# Patient Record
Sex: MALE | Race: WHITE | NOT HISPANIC OR LATINO | Employment: OTHER | URBAN - NONMETROPOLITAN AREA
[De-identification: names, ages, dates, MRNs, and addresses within clinical notes are randomized per-mention and may not be internally consistent; named-entity substitution may affect disease eponyms.]

---

## 2017-05-23 ENCOUNTER — OUTSIDE FACILITY SERVICE (OUTPATIENT)
Dept: CARDIOLOGY | Facility: CLINIC | Age: 72
End: 2017-05-23

## 2017-05-23 PROCEDURE — 93306 TTE W/DOPPLER COMPLETE: CPT | Performed by: INTERNAL MEDICINE

## 2017-06-19 ENCOUNTER — OFFICE VISIT (OUTPATIENT)
Dept: CARDIOLOGY | Age: 72
End: 2017-06-19
Payer: MEDICARE

## 2017-06-19 ENCOUNTER — TELEPHONE (OUTPATIENT)
Dept: CARDIOLOGY | Age: 72
End: 2017-06-19

## 2017-06-19 VITALS
HEART RATE: 49 BPM | WEIGHT: 283.8 LBS | SYSTOLIC BLOOD PRESSURE: 100 MMHG | HEIGHT: 70 IN | BODY MASS INDEX: 40.63 KG/M2 | DIASTOLIC BLOOD PRESSURE: 60 MMHG

## 2017-06-19 DIAGNOSIS — I10 ESSENTIAL HYPERTENSION: Primary | ICD-10-CM

## 2017-06-19 PROCEDURE — G8598 ASA/ANTIPLAT THER USED: HCPCS | Performed by: INTERNAL MEDICINE

## 2017-06-19 PROCEDURE — G8427 DOCREV CUR MEDS BY ELIG CLIN: HCPCS | Performed by: INTERNAL MEDICINE

## 2017-06-19 PROCEDURE — 3017F COLORECTAL CA SCREEN DOC REV: CPT | Performed by: INTERNAL MEDICINE

## 2017-06-19 PROCEDURE — 1123F ACP DISCUSS/DSCN MKR DOCD: CPT | Performed by: INTERNAL MEDICINE

## 2017-06-19 PROCEDURE — 4040F PNEUMOC VAC/ADMIN/RCVD: CPT | Performed by: INTERNAL MEDICINE

## 2017-06-19 PROCEDURE — 1036F TOBACCO NON-USER: CPT | Performed by: INTERNAL MEDICINE

## 2017-06-19 PROCEDURE — 99214 OFFICE O/P EST MOD 30 MIN: CPT | Performed by: INTERNAL MEDICINE

## 2017-06-19 PROCEDURE — G8417 CALC BMI ABV UP PARAM F/U: HCPCS | Performed by: INTERNAL MEDICINE

## 2017-06-19 PROCEDURE — 93000 ELECTROCARDIOGRAM COMPLETE: CPT | Performed by: INTERNAL MEDICINE

## 2017-06-19 RX ORDER — NIFEDIPINE 90 MG/1
90 TABLET, FILM COATED, EXTENDED RELEASE ORAL DAILY
COMMUNITY
Start: 2017-06-08 | End: 2017-10-04

## 2017-06-19 RX ORDER — SPIRONOLACTONE 100 MG/1
100 TABLET, FILM COATED ORAL DAILY
COMMUNITY
Start: 2017-05-25 | End: 2017-10-04 | Stop reason: ALTCHOICE

## 2017-06-19 RX ORDER — METOPROLOL TARTRATE 50 MG/1
25 TABLET, FILM COATED ORAL 2 TIMES DAILY
COMMUNITY
Start: 2017-06-08 | End: 2017-10-04 | Stop reason: SDUPTHER

## 2017-06-19 RX ORDER — NIFEDIPINE 30 MG/1
30 TABLET, FILM COATED, EXTENDED RELEASE ORAL DAILY
Qty: 30 TABLET | Refills: 3 | Status: CANCELLED | OUTPATIENT
Start: 2017-06-19

## 2017-06-19 RX ORDER — FUROSEMIDE 80 MG
TABLET ORAL DAILY
COMMUNITY
Start: 2017-05-15 | End: 2017-07-24 | Stop reason: ALTCHOICE

## 2017-06-19 RX ORDER — POTASSIUM CHLORIDE 750 MG/1
TABLET, FILM COATED, EXTENDED RELEASE ORAL 2 TIMES DAILY
COMMUNITY
Start: 2017-04-24 | End: 2017-10-04 | Stop reason: ALTCHOICE

## 2017-07-24 ENCOUNTER — OFFICE VISIT (OUTPATIENT)
Dept: CARDIOLOGY | Age: 72
End: 2017-07-24
Payer: MEDICARE

## 2017-07-24 VITALS
DIASTOLIC BLOOD PRESSURE: 72 MMHG | HEIGHT: 70 IN | SYSTOLIC BLOOD PRESSURE: 122 MMHG | WEIGHT: 282 LBS | HEART RATE: 44 BPM | BODY MASS INDEX: 40.37 KG/M2

## 2017-07-24 DIAGNOSIS — T82.03XA PERIVALVULAR LEAK OF PROSTHETIC HEART VALVE, INITIAL ENCOUNTER: ICD-10-CM

## 2017-07-24 DIAGNOSIS — Z95.2 S/P AVR: ICD-10-CM

## 2017-07-24 DIAGNOSIS — R06.09 DOE (DYSPNEA ON EXERTION): ICD-10-CM

## 2017-07-24 DIAGNOSIS — I35.9 AORTIC VALVE DISORDER: Primary | ICD-10-CM

## 2017-07-24 DIAGNOSIS — Z95.2 S/P AVR (AORTIC VALVE REPLACEMENT): ICD-10-CM

## 2017-07-24 DIAGNOSIS — Z95.3 S/P AORTIC VALVE REPLACEMENT WITH BIOPROSTHETIC VALVE: ICD-10-CM

## 2017-07-24 DIAGNOSIS — I10 ESSENTIAL HYPERTENSION: ICD-10-CM

## 2017-07-24 DIAGNOSIS — I25.10 CORONARY ARTERY DISEASE INVOLVING NATIVE HEART WITHOUT ANGINA PECTORIS, UNSPECIFIED VESSEL OR LESION TYPE: ICD-10-CM

## 2017-07-24 LAB
ANION GAP SERPL CALCULATED.3IONS-SCNC: 17 MMOL/L (ref 7–19)
BASOPHILS ABSOLUTE: 0 K/UL (ref 0–0.2)
BASOPHILS RELATIVE PERCENT: 0.6 % (ref 0–1)
BUN BLDV-MCNC: 44 MG/DL (ref 8–23)
CALCIUM SERPL-MCNC: 8.9 MG/DL (ref 8.8–10.2)
CHLORIDE BLD-SCNC: 104 MMOL/L (ref 98–111)
CO2: 23 MMOL/L (ref 22–29)
CREAT SERPL-MCNC: 2.7 MG/DL (ref 0.5–1.2)
EOSINOPHILS ABSOLUTE: 0.1 K/UL (ref 0–0.6)
EOSINOPHILS RELATIVE PERCENT: 2.5 % (ref 0–5)
GFR NON-AFRICAN AMERICAN: 23
GLUCOSE BLD-MCNC: 100 MG/DL (ref 74–109)
HCT VFR BLD CALC: 28.3 % (ref 42–52)
HEMOGLOBIN: 9.2 G/DL (ref 14–18)
LYMPHOCYTES ABSOLUTE: 1.4 K/UL (ref 1.1–4.5)
LYMPHOCYTES RELATIVE PERCENT: 29.2 % (ref 20–40)
MCH RBC QN AUTO: 28.8 PG (ref 27–31)
MCHC RBC AUTO-ENTMCNC: 32.5 G/DL (ref 33–37)
MCV RBC AUTO: 88.7 FL (ref 80–94)
MONOCYTES ABSOLUTE: 0.6 K/UL (ref 0–0.9)
MONOCYTES RELATIVE PERCENT: 11.3 % (ref 0–10)
NEUTROPHILS ABSOLUTE: 2.7 K/UL (ref 1.5–7.5)
NEUTROPHILS RELATIVE PERCENT: 56.2 % (ref 50–65)
PDW BLD-RTO: 12.5 % (ref 11.5–14.5)
PLATELET # BLD: 216 K/UL (ref 130–400)
PMV BLD AUTO: 10.3 FL (ref 9.4–12.4)
POTASSIUM SERPL-SCNC: 4.7 MMOL/L (ref 3.5–5)
PRO-BNP: 3341 PG/ML (ref 0–900)
RBC # BLD: 3.19 M/UL (ref 4.7–6.1)
SODIUM BLD-SCNC: 144 MMOL/L (ref 136–145)
WBC # BLD: 4.9 K/UL (ref 4.8–10.8)

## 2017-07-24 PROCEDURE — G8427 DOCREV CUR MEDS BY ELIG CLIN: HCPCS | Performed by: NURSE PRACTITIONER

## 2017-07-24 PROCEDURE — 99213 OFFICE O/P EST LOW 20 MIN: CPT | Performed by: NURSE PRACTITIONER

## 2017-07-24 PROCEDURE — 1123F ACP DISCUSS/DSCN MKR DOCD: CPT | Performed by: NURSE PRACTITIONER

## 2017-07-24 PROCEDURE — G8417 CALC BMI ABV UP PARAM F/U: HCPCS | Performed by: NURSE PRACTITIONER

## 2017-07-24 PROCEDURE — 1036F TOBACCO NON-USER: CPT | Performed by: NURSE PRACTITIONER

## 2017-07-24 PROCEDURE — G8598 ASA/ANTIPLAT THER USED: HCPCS | Performed by: NURSE PRACTITIONER

## 2017-07-24 PROCEDURE — 93000 ELECTROCARDIOGRAM COMPLETE: CPT | Performed by: NURSE PRACTITIONER

## 2017-07-24 PROCEDURE — 3017F COLORECTAL CA SCREEN DOC REV: CPT | Performed by: NURSE PRACTITIONER

## 2017-07-24 PROCEDURE — 4040F PNEUMOC VAC/ADMIN/RCVD: CPT | Performed by: NURSE PRACTITIONER

## 2017-07-24 RX ORDER — BUMETANIDE 2 MG/1
1 TABLET ORAL 2 TIMES DAILY
COMMUNITY
Start: 2017-07-13

## 2017-07-24 RX ORDER — AMPICILLIN TRIHYDRATE 250 MG
200 CAPSULE ORAL 2 TIMES DAILY
COMMUNITY

## 2017-08-01 ENCOUNTER — HOSPITAL ENCOUNTER (OUTPATIENT)
Dept: CARDIAC CATH/INVASIVE PROCEDURES | Age: 72
Discharge: HOME | End: 2017-08-01
Attending: INTERNAL MEDICINE | Admitting: INTERNAL MEDICINE
Payer: MEDICARE

## 2017-08-01 VITALS
DIASTOLIC BLOOD PRESSURE: 78 MMHG | TEMPERATURE: 98.3 F | SYSTOLIC BLOOD PRESSURE: 156 MMHG | BODY MASS INDEX: 40.37 KG/M2 | HEART RATE: 60 BPM | RESPIRATION RATE: 17 BRPM | HEIGHT: 70 IN | WEIGHT: 282 LBS | OXYGEN SATURATION: 97 %

## 2017-08-01 DIAGNOSIS — Z95.2 S/P AVR (AORTIC VALVE REPLACEMENT): ICD-10-CM

## 2017-08-01 DIAGNOSIS — T82.03XA PERIVALVULAR LEAK OF PROSTHETIC HEART VALVE, INITIAL ENCOUNTER: ICD-10-CM

## 2017-08-01 DIAGNOSIS — I10 ESSENTIAL HYPERTENSION: ICD-10-CM

## 2017-08-01 DIAGNOSIS — R06.09 DOE (DYSPNEA ON EXERTION): ICD-10-CM

## 2017-08-01 DIAGNOSIS — I25.10 CORONARY ARTERY DISEASE INVOLVING NATIVE HEART WITHOUT ANGINA PECTORIS, UNSPECIFIED VESSEL OR LESION TYPE: ICD-10-CM

## 2017-08-01 DIAGNOSIS — I35.9 AORTIC VALVE DISORDER: ICD-10-CM

## 2017-08-01 LAB
LV EF: 63 %
LVEF MODALITY: NORMAL

## 2017-08-01 PROCEDURE — 93325 DOPPLER ECHO COLOR FLOW MAPG: CPT

## 2017-08-01 PROCEDURE — 6360000002 HC RX W HCPCS

## 2017-08-01 PROCEDURE — 93306 TTE W/DOPPLER COMPLETE: CPT

## 2017-08-01 PROCEDURE — 93321 DOPPLER ECHO F-UP/LMTD STD: CPT

## 2017-08-01 PROCEDURE — 2580000003 HC RX 258: Performed by: INTERNAL MEDICINE

## 2017-08-01 PROCEDURE — 6370000000 HC RX 637 (ALT 250 FOR IP)

## 2017-08-01 PROCEDURE — 93312 ECHO TRANSESOPHAGEAL: CPT

## 2017-08-01 RX ORDER — DOXAZOSIN 2 MG/1
2 TABLET ORAL NIGHTLY
COMMUNITY
End: 2017-10-04 | Stop reason: DRUGHIGH

## 2017-08-01 RX ORDER — SODIUM CHLORIDE 9 MG/ML
INJECTION, SOLUTION INTRAVENOUS CONTINUOUS
Status: DISCONTINUED | OUTPATIENT
Start: 2017-08-01 | End: 2017-08-01 | Stop reason: HOSPADM

## 2017-08-01 RX ORDER — SODIUM CHLORIDE 0.9 % (FLUSH) 0.9 %
10 SYRINGE (ML) INJECTION PRN
Status: DISCONTINUED | OUTPATIENT
Start: 2017-08-01 | End: 2017-08-01 | Stop reason: HOSPADM

## 2017-08-01 RX ORDER — SODIUM CHLORIDE 0.9 % (FLUSH) 0.9 %
10 SYRINGE (ML) INJECTION EVERY 12 HOURS SCHEDULED
Status: DISCONTINUED | OUTPATIENT
Start: 2017-08-01 | End: 2017-08-01 | Stop reason: HOSPADM

## 2017-08-01 RX ADMIN — SODIUM CHLORIDE: 9 INJECTION, SOLUTION INTRAVENOUS at 07:30

## 2017-10-02 ENCOUNTER — HOSPITAL ENCOUNTER (OUTPATIENT)
Dept: ULTRASOUND IMAGING | Age: 72
Discharge: HOME | End: 2017-10-02
Payer: MEDICARE

## 2017-10-02 DIAGNOSIS — N18.30 CHRONIC KIDNEY DISEASE, STAGE III (MODERATE) (HCC): ICD-10-CM

## 2017-10-02 PROCEDURE — 93976 VASCULAR STUDY: CPT

## 2017-10-04 ENCOUNTER — OFFICE VISIT (OUTPATIENT)
Dept: CARDIOLOGY | Age: 72
End: 2017-10-04
Payer: MEDICARE

## 2017-10-04 VITALS
HEIGHT: 70 IN | SYSTOLIC BLOOD PRESSURE: 138 MMHG | HEART RATE: 56 BPM | BODY MASS INDEX: 39.8 KG/M2 | WEIGHT: 278 LBS | DIASTOLIC BLOOD PRESSURE: 70 MMHG

## 2017-10-04 DIAGNOSIS — I10 ESSENTIAL HYPERTENSION: Primary | ICD-10-CM

## 2017-10-04 PROCEDURE — 99214 OFFICE O/P EST MOD 30 MIN: CPT | Performed by: INTERNAL MEDICINE

## 2017-10-04 PROCEDURE — G8484 FLU IMMUNIZE NO ADMIN: HCPCS | Performed by: INTERNAL MEDICINE

## 2017-10-04 PROCEDURE — 1123F ACP DISCUSS/DSCN MKR DOCD: CPT | Performed by: INTERNAL MEDICINE

## 2017-10-04 PROCEDURE — 93000 ELECTROCARDIOGRAM COMPLETE: CPT | Performed by: INTERNAL MEDICINE

## 2017-10-04 PROCEDURE — 3017F COLORECTAL CA SCREEN DOC REV: CPT | Performed by: INTERNAL MEDICINE

## 2017-10-04 PROCEDURE — G8417 CALC BMI ABV UP PARAM F/U: HCPCS | Performed by: INTERNAL MEDICINE

## 2017-10-04 PROCEDURE — 1036F TOBACCO NON-USER: CPT | Performed by: INTERNAL MEDICINE

## 2017-10-04 PROCEDURE — G8598 ASA/ANTIPLAT THER USED: HCPCS | Performed by: INTERNAL MEDICINE

## 2017-10-04 PROCEDURE — G8427 DOCREV CUR MEDS BY ELIG CLIN: HCPCS | Performed by: INTERNAL MEDICINE

## 2017-10-04 PROCEDURE — 4040F PNEUMOC VAC/ADMIN/RCVD: CPT | Performed by: INTERNAL MEDICINE

## 2017-10-04 RX ORDER — HYDRALAZINE HYDROCHLORIDE 100 MG/1
100 TABLET, FILM COATED ORAL 2 TIMES DAILY
COMMUNITY
End: 2022-03-12

## 2017-10-04 RX ORDER — ISOSORBIDE MONONITRATE 30 MG/1
30 TABLET, EXTENDED RELEASE ORAL DAILY
COMMUNITY

## 2017-10-04 RX ORDER — MULTIVITAMIN WITH IRON
250 TABLET ORAL DAILY
COMMUNITY
End: 2019-03-26 | Stop reason: ALTCHOICE

## 2017-10-04 RX ORDER — METOLAZONE 5 MG/1
5 TABLET ORAL DAILY
COMMUNITY
End: 2018-04-10 | Stop reason: ALTCHOICE

## 2017-10-04 RX ORDER — DOXAZOSIN 8 MG/1
8 TABLET ORAL NIGHTLY
COMMUNITY
End: 2022-03-12

## 2017-10-04 ASSESSMENT — ENCOUNTER SYMPTOMS: SHORTNESS OF BREATH: 1

## 2017-10-04 NOTE — MR AVS SNAPSHOT
After Visit Summary             Taylor Door   10/4/2017 8:30 AM   Office Visit    Description:  Male : 1945   Provider:  Tonya Albarran MD   Department:  [facility]              Your Follow-Up and Future Appointments         Below is a list of your follow-up and future appointments. This may not be a complete list as you may have made appointments directly with providers that we are not aware of or your providers may have made some for you. Please call your providers to confirm appointments. It is important to keep your appointments. Please bring your current insurance card, photo ID, co-pay, and all medication bottles to your appointment. If self-pay, payment is expected at the time of service. Your To-Do List     Future Appointments Provider Department Dept Phone    2018 11:30 AM Tonya Albarran MD [address] [place] [address] [phone]    Please arrive 15 minutes prior to appointment time, bring insurance card and photo ID. Follow-Up    Return in about 6 months (around 2018) for prosthetic valve mismatch, No Gown, Wednesday, 27 Min. Information from Your Visit        Department     Name Address Phone Fax    [facility] [address] [place] [address] Street [address] [place] [address] [phone]      You Were Seen for:         Comments    Essential hypertension   [[id number]]         Vital Signs     Blood Pressure Pulse Height Weight Body Mass Index Smoking Status    138/70 56 5' 10\" (1.778 m) 278 lb (126.1 kg) 39.89 kg/m2 Former Smoker      Additional Information about your Body Mass Index (BMI)           Your BMI as listed above is considered obese (30 or more). BMI is an estimate of body fat, calculated from your height and weight.   The higher your BMI, the greater your risk of heart disease, high blood pressure,

## 2017-10-04 NOTE — PROGRESS NOTES
He is doing better. They got the fluid off and the breathing is better. Recently dx with chronic kidney disease stage four with Useff.  Being treated fro [facility]     HTN: patient is considering going to hypertension center     FU once here then back to [place]

## 2017-10-04 NOTE — PROGRESS NOTES
Hyperlipemia     Hypertension 3/23/2012    Obesity     Unspecified sleep apnea        Past Surgical History:   Procedure Laterality Date    AORTIC VALVE REPLACEMENT  07/21/15    Tissue Valve, Dr. Staci Trejo  3/26/12    CARDIAC CATHETERIZATION  5/21/2013  [facility]    with stent to Circ- EF 50%    CARDIAC CATHETERIZATION  11/4/13  [facility]    EF 60%, no intervention    CARDIAC CATHETERIZATION  7/20/15  [facility]    severe aortic insufficiency. EF 50%    THORACENTESIS Left 08/04/15    VASCULAR SURGERY  11-6-13 [facility] & [address] R superficial femoral artery and psuedoaneurysm. Thrombin injection R SFA pseudoaneurysm. Family History   Problem Relation Age of Onset    Heart Disease Other     High Blood Pressure Other        Social History     Social History    Marital status:      Spouse name: N/A    Number of children: N/A    Years of education: N/A     Occupational History    Not on file.      Social History Main Topics    Smoking status: Former Smoker     Quit date: 6/9/1985    Smokeless tobacco: Not on file    Alcohol use No    Drug use: No    Sexual activity: Not on file     Other Topics Concern    Not on file     Social History Narrative       No Known Allergies      Current Outpatient Prescriptions:     doxazosin (CARDURA) 8 MG tablet, Take 8 mg by mouth nightly, Disp: , Rfl:     magnesium (MAGNESIUM-OXIDE) 250 MG TABS tablet, Take 250 mg by mouth daily, Disp: , Rfl:     isosorbide mononitrate (IMDUR) 30 MG extended release tablet, Take 30 mg by mouth daily, Disp: , Rfl:     hydrALAZINE (APRESOLINE) 100 MG tablet, Take 100 mg by mouth 2 times daily, Disp: , Rfl:     metolazone (ZAROXOLYN) 5 MG tablet, Take 5 mg by mouth daily, Disp: , Rfl:     Coenzyme Q10 (COQ10) 200 MG CAPS, Take 200 mg by mouth nightly , Disp: , Rfl:     bumetanide (BUMEX) 2 MG tablet, 2 mg 2 times daily , Disp: , Rfl:     metoprolol tartrate (LOPRESSOR) 50 MG tablet, 25 mg 2 times daily , Disp: , Rfl:    aspirin 81 MG tablet, Take 81 mg by mouth daily, Disp: , Rfl:     atorvastatin (LIPITOR) 40 MG tablet, Take 1 tablet by mouth daily. (Patient taking differently: Take 40 mg by mouth every other day ), Disp: 30 tablet, Rfl: 5    omeprazole (PRILOSEC) 20 MG capsule, Take 20 mg by mouth daily. , Disp: , Rfl:     gabapentin (NEURONTIN) 300 MG capsule, Take 300 mg by mouth nightly , Disp: , Rfl:     PE:  Vitals:    10/04/17 0839   BP: 138/70   Pulse: 56       Estimated body mass index is 39.89 kg/(m^2) as calculated from the following:    Height as of this encounter: 5' 10\" (1.778 m). Weight as of this encounter: 278 lb (126.1 kg).     General - No acute distress  Eyes - PERRL, anicteric sclerae; no lid-lag  ENMT - Atraumatic; Mucous membranes moist, oropharynx clear  Neck - trachea midline, thyroid non-tender  Cardio - No jugular venous distension                Clear s1 s2, no gallop, rub,3 out of 6 harsh systolic murmur right upper sternal border             1+ lower extremity edema, normal pulses  Resp - Normal effort, Clear to auscultation bilaterally  GI - abdomen soft, non-tender, no hepatosplenomegaly  Skin - warm and dry; no rashes  Psych - A+O x 3, normal affect    Lab Results   Component Value Date    CREATININE 2.7 07/24/2017    CREATININE 1.0 10/05/2016    CREATININE 1.1 08/27/2015    CREATININE 0.9 03/26/2012    HGB 9.2 07/24/2017    HGB 13.3 10/05/2016    HGB 9.4 08/27/2015    PROBNP 3341 07/24/2017       ECG 10/04/17   Sinus bradycardia    TTE 8/1/17   1. Prosthetic valve in the aortic position with high gradient and flow.   Likely paravalvular leak mild   2. Mildly dilated left atrium   3. Mildly enlarged left ventricular cavity dimensions with moderate   concentric LVH   4. Normal LV systolic function (estimated LV EF 60-65%)   5. Grade II diastolic dysfunction (pseudo-normal filling) with elevated   left atrial pressure   6. Mild tricuspid regurgitation; estimated RVSP of at least 33 mmHg    JONNY

## 2017-10-04 NOTE — LETTER
10/4/17      Stephany Castillo  [address]    He is doing better. They got the fluid off and the breathing is better. Recently dx with chronic kidney disease stage four with Useff. Being treated fro [facility]     HTN: patient is considering going to hypertension center     FU once here then back to [facility],    Thank you for allowing me to participate in the care of Mr. Jan Mora. He presents today at the [address] [place] [address] in the [facility] his wife. As you know, Mr. Maverick Mancera is a 70 y.o. 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 Since last being seen, we performed a transthoracic echo and a transesophageal echo. The trans-thoracic echo showed velocities of 4.27 m/s with a mean gradient of 41 mm per mercury. The effective orifice area was 0.44 cm²/m². We performed a transesophageal echo that showed a normal functioning bioprosthetic valve in the aortic position with mild paravalvular leak. Since then, he says that he's done better with diuretics. He still gets short of air when he walks. He still has fatigue. Nothing makes it better except rest.  His blood pressures been poorly controlled and he has been seeing his primary care doctor for management. Of note, he was also diagnosed with anemia with a recent hemoglobin of 8.8. His wife intends to take into the hypertensive center at [address] [place] [address]. He's been compliant with his medications. He has no chest pain. He otherwise denies chest pain, PND, syncope, or near syncope. He has no other complaints. Review of Systems   Constitutional: Positive for malaise/fatigue. Respiratory: Positive for shortness of breath. Cardiovascular: Negative for chest pain.   hydrALAZINE (APRESOLINE) 100 MG tablet, Take 100 mg by mouth 2 times daily, Disp: , Rfl:     metolazone (ZAROXOLYN) 5 MG tablet, Take 5 mg by mouth daily, Disp: , Rfl:     Coenzyme Q10 (COQ10) 200 MG CAPS, Take 200 mg by mouth nightly , Disp: , Rfl:     bumetanide (BUMEX) 2 MG tablet, 2 mg 2 times daily , Disp: , Rfl:     metoprolol tartrate (LOPRESSOR) 50 MG tablet, 25 mg 2 times daily , Disp: , Rfl:     aspirin 81 MG tablet, Take 81 mg by mouth daily, Disp: , Rfl:     atorvastatin (LIPITOR) 40 MG tablet, Take 1 tablet by mouth daily. (Patient taking differently: Take 40 mg by mouth every other day ), Disp: 30 tablet, Rfl: 5    omeprazole (PRILOSEC) 20 MG capsule, Take 20 mg by mouth daily. , Disp: , Rfl:     gabapentin (NEURONTIN) 300 MG capsule, Take 300 mg by mouth nightly , Disp: , Rfl:     PE:  Vitals:    10/04/17 0839   BP: 138/70   Pulse: 56       Estimated body mass index is 39.89 kg/(m^2) as calculated from the following:    Height as of this encounter: 5' 10\" (1.778 m). Weight as of this encounter: 278 lb (126.1 kg).     General - No acute distress  Eyes - PERRL, anicteric sclerae; no lid-lag  ENMT - Atraumatic; Mucous membranes moist, oropharynx clear  Neck - trachea midline, thyroid non-tender  Cardio - No jugular venous distension                Clear s1 s2, no gallop, rub,3 out of 6 harsh systolic murmur right upper sternal border             1+ lower extremity edema, normal pulses  Resp - Normal effort, Clear to auscultation bilaterally  GI - abdomen soft, non-tender, no hepatosplenomegaly  Skin - warm and dry; no rashes  Psych - A+O x 3, normal affect    Lab Results   Component Value Date    CREATININE 2.7 07/24/2017    CREATININE 1.0 10/05/2016    CREATININE 1.1 08/27/2015    CREATININE 0.9 03/26/2012    HGB 9.2 07/24/2017    HGB 13.3 10/05/2016    HGB 9.4 08/27/2015    PROBNP 3341 07/24/2017       ECG 10/04/17   Sinus bradycardia    TTE 8/1/17

## 2017-11-14 ENCOUNTER — TRANSCRIBE ORDERS (OUTPATIENT)
Dept: ADMINISTRATIVE | Facility: HOSPITAL | Age: 72
End: 2017-11-14

## 2017-11-14 DIAGNOSIS — D72.819 LEUKOPENIA, UNSPECIFIED TYPE: Primary | ICD-10-CM

## 2017-11-16 ENCOUNTER — APPOINTMENT (OUTPATIENT)
Dept: GENERAL RADIOLOGY | Facility: HOSPITAL | Age: 72
End: 2017-11-16
Attending: INTERNAL MEDICINE

## 2017-11-22 ENCOUNTER — HOSPITAL ENCOUNTER (OUTPATIENT)
Dept: GENERAL RADIOLOGY | Facility: HOSPITAL | Age: 72
Discharge: HOME | End: 2017-11-22
Attending: INTERNAL MEDICINE | Admitting: INTERNAL MEDICINE

## 2017-11-22 DIAGNOSIS — D72.819 LEUKOPENIA, UNSPECIFIED TYPE: ICD-10-CM

## 2017-11-22 PROCEDURE — 77075 RADEX OSSEOUS SURVEY COMPL: CPT

## 2017-11-27 ENCOUNTER — TRANSCRIBE ORDERS (OUTPATIENT)
Dept: ADMINISTRATIVE | Facility: HOSPITAL | Age: 72
End: 2017-11-27

## 2017-11-27 DIAGNOSIS — M89.9 BONE LESION: Primary | ICD-10-CM

## 2017-12-01 ENCOUNTER — HOSPITAL ENCOUNTER (OUTPATIENT)
Dept: CT IMAGING | Facility: HOSPITAL | Age: 72
Discharge: HOME | End: 2017-12-01
Attending: INTERNAL MEDICINE | Admitting: INTERNAL MEDICINE

## 2017-12-01 VITALS
TEMPERATURE: 97.7 F | RESPIRATION RATE: 11 BRPM | DIASTOLIC BLOOD PRESSURE: 81 MMHG | BODY MASS INDEX: 38.51 KG/M2 | WEIGHT: 269 LBS | SYSTOLIC BLOOD PRESSURE: 171 MMHG | HEIGHT: 70 IN | OXYGEN SATURATION: 99 % | HEART RATE: 41 BPM

## 2017-12-01 DIAGNOSIS — M89.9 BONE LESION: ICD-10-CM

## 2017-12-01 LAB
APTT PPP: 29.1 SECONDS (ref 24.1–34.8)
BASOPHILS # BLD AUTO: 0.04 10*3/MM3 (ref 0–0.2)
BASOPHILS NFR BLD AUTO: 0.8 % (ref 0–2)
DEPRECATED RDW RBC AUTO: 44.8 FL (ref 40–54)
EOSINOPHIL # BLD AUTO: 0.12 10*3/MM3 (ref 0–0.7)
EOSINOPHIL NFR BLD AUTO: 2.5 % (ref 0–4)
ERYTHROCYTE [DISTWIDTH] IN BLOOD BY AUTOMATED COUNT: 13.7 % (ref 12–15)
HCT VFR BLD AUTO: 34.9 % (ref 40–52)
HGB BLD-MCNC: 10.9 G/DL (ref 14–18)
IMM GRANULOCYTES # BLD: 0.01 10*3/MM3 (ref 0–0.03)
IMM GRANULOCYTES NFR BLD: 0.2 % (ref 0–5)
INR PPP: 0.83 (ref 0.91–1.09)
LYMPHOCYTES # BLD AUTO: 0.99 10*3/MM3 (ref 0.72–4.86)
LYMPHOCYTES NFR BLD AUTO: 20.8 % (ref 15–45)
MCH RBC QN AUTO: 28.2 PG (ref 28–32)
MCHC RBC AUTO-ENTMCNC: 31.2 G/DL (ref 33–36)
MCV RBC AUTO: 90.4 FL (ref 82–95)
MONOCYTES # BLD AUTO: 0.4 10*3/MM3 (ref 0.19–1.3)
MONOCYTES NFR BLD AUTO: 8.4 % (ref 4–12)
NEUTROPHILS # BLD AUTO: 3.2 10*3/MM3 (ref 1.87–8.4)
NEUTROPHILS NFR BLD AUTO: 67.3 % (ref 39–78)
PLATELET # BLD AUTO: 173 10*3/MM3 (ref 130–400)
PMV BLD AUTO: 11.5 FL (ref 6–12)
PROTHROMBIN TIME: 11.6 SECONDS (ref 11.9–14.6)
RBC # BLD AUTO: 3.86 10*6/MM3 (ref 4.8–5.9)
WBC NRBC COR # BLD: 4.76 10*3/MM3 (ref 4.8–10.8)

## 2017-12-01 PROCEDURE — 88311 DECALCIFY TISSUE: CPT | Performed by: INTERNAL MEDICINE

## 2017-12-01 PROCEDURE — 88264 CHROMOSOME ANALYSIS 20-25: CPT | Performed by: INTERNAL MEDICINE

## 2017-12-01 PROCEDURE — 88305 TISSUE EXAM BY PATHOLOGIST: CPT | Performed by: INTERNAL MEDICINE

## 2017-12-01 PROCEDURE — 88280 CHROMOSOME KARYOTYPE STUDY: CPT | Performed by: INTERNAL MEDICINE

## 2017-12-01 PROCEDURE — 25010000002 MIDAZOLAM PER 1 MG: Performed by: RADIOLOGY

## 2017-12-01 PROCEDURE — 85730 THROMBOPLASTIN TIME PARTIAL: CPT | Performed by: INTERNAL MEDICINE

## 2017-12-01 PROCEDURE — 85610 PROTHROMBIN TIME: CPT | Performed by: INTERNAL MEDICINE

## 2017-12-01 PROCEDURE — 88184 FLOWCYTOMETRY/ TC 1 MARKER: CPT | Performed by: INTERNAL MEDICINE

## 2017-12-01 PROCEDURE — 85025 COMPLETE CBC W/AUTO DIFF WBC: CPT | Performed by: INTERNAL MEDICINE

## 2017-12-01 PROCEDURE — 36415 COLL VENOUS BLD VENIPUNCTURE: CPT

## 2017-12-01 PROCEDURE — 88237 TISSUE CULTURE BONE MARROW: CPT | Performed by: INTERNAL MEDICINE

## 2017-12-01 PROCEDURE — 88185 FLOWCYTOMETRY/TC ADD-ON: CPT | Performed by: INTERNAL MEDICINE

## 2017-12-01 PROCEDURE — 77012 CT SCAN FOR NEEDLE BIOPSY: CPT

## 2017-12-01 PROCEDURE — 25010000002 FENTANYL CITRATE (PF) 100 MCG/2ML SOLUTION: Performed by: RADIOLOGY

## 2017-12-01 PROCEDURE — 88313 SPECIAL STAINS GROUP 2: CPT | Performed by: INTERNAL MEDICINE

## 2017-12-01 RX ORDER — MIDAZOLAM HYDROCHLORIDE 1 MG/ML
INJECTION INTRAMUSCULAR; INTRAVENOUS
Status: COMPLETED | OUTPATIENT
Start: 2017-12-01 | End: 2017-12-01

## 2017-12-01 RX ORDER — ATORVASTATIN CALCIUM 40 MG/1
40 TABLET, FILM COATED ORAL NIGHTLY
COMMUNITY

## 2017-12-01 RX ORDER — ALLOPURINOL 100 MG/1
100 TABLET ORAL DAILY
COMMUNITY
End: 2019-12-17

## 2017-12-01 RX ORDER — HYDRALAZINE HYDROCHLORIDE 50 MG/1
100 TABLET, FILM COATED ORAL 3 TIMES DAILY
COMMUNITY
End: 2019-12-17

## 2017-12-01 RX ORDER — SPIRONOLACTONE 25 MG/1
25 TABLET ORAL 2 TIMES DAILY
COMMUNITY

## 2017-12-01 RX ORDER — ISOSORBIDE MONONITRATE 30 MG/1
30 TABLET, EXTENDED RELEASE ORAL DAILY
COMMUNITY

## 2017-12-01 RX ORDER — SODIUM CHLORIDE 0.9 % (FLUSH) 0.9 %
1-10 SYRINGE (ML) INJECTION AS NEEDED
Status: DISCONTINUED | OUTPATIENT
Start: 2017-12-01 | End: 2017-12-02 | Stop reason: HOSPADM

## 2017-12-01 RX ORDER — DOXAZOSIN 8 MG/1
8 TABLET ORAL NIGHTLY
COMMUNITY

## 2017-12-01 RX ORDER — GABAPENTIN 300 MG/1
300 CAPSULE ORAL DAILY
COMMUNITY

## 2017-12-01 RX ORDER — OMEPRAZOLE 20 MG/1
20 CAPSULE, DELAYED RELEASE ORAL DAILY
COMMUNITY

## 2017-12-01 RX ORDER — UBIDECARENONE 100 MG
200 CAPSULE ORAL DAILY
COMMUNITY

## 2017-12-01 RX ORDER — FENTANYL CITRATE 50 UG/ML
INJECTION, SOLUTION INTRAMUSCULAR; INTRAVENOUS
Status: COMPLETED | OUTPATIENT
Start: 2017-12-01 | End: 2017-12-01

## 2017-12-01 RX ORDER — BUMETANIDE 1 MG/1
1 TABLET ORAL 2 TIMES DAILY
COMMUNITY

## 2017-12-01 RX ORDER — ASPIRIN 81 MG/1
81 TABLET, CHEWABLE ORAL DAILY
COMMUNITY

## 2017-12-01 RX ORDER — MAGNESIUM GLUCONATE 27 MG(500)
250 TABLET ORAL 2 TIMES DAILY
COMMUNITY
End: 2019-12-17

## 2017-12-01 RX ORDER — LIDOCAINE HYDROCHLORIDE 10 MG/ML
INJECTION, SOLUTION INFILTRATION; PERINEURAL
Status: COMPLETED | OUTPATIENT
Start: 2017-12-01 | End: 2017-12-01

## 2017-12-01 RX ORDER — FERROUS SULFATE 325(65) MG
65 TABLET ORAL
COMMUNITY
End: 2019-12-17 | Stop reason: SDUPTHER

## 2017-12-01 RX ADMIN — LIDOCAINE HYDROCHLORIDE 10 ML: 10 INJECTION, SOLUTION INFILTRATION; PERINEURAL at 12:38

## 2017-12-01 RX ADMIN — FENTANYL CITRATE 50 MCG: 50 INJECTION, SOLUTION INTRAMUSCULAR; INTRAVENOUS at 12:37

## 2017-12-01 RX ADMIN — MIDAZOLAM 1 MG: 1 INJECTION INTRAMUSCULAR; INTRAVENOUS at 12:37

## 2017-12-04 ENCOUNTER — HOSPITAL ENCOUNTER (OUTPATIENT)
Dept: CT IMAGING | Facility: HOSPITAL | Age: 72
Discharge: HOME | End: 2017-12-04
Attending: INTERNAL MEDICINE | Admitting: INTERNAL MEDICINE

## 2017-12-04 ENCOUNTER — TRANSCRIBE ORDERS (OUTPATIENT)
Dept: ADMINISTRATIVE | Facility: HOSPITAL | Age: 72
End: 2017-12-04

## 2017-12-04 ENCOUNTER — LAB (OUTPATIENT)
Dept: LAB | Facility: HOSPITAL | Age: 72
End: 2017-12-04
Attending: INTERNAL MEDICINE

## 2017-12-04 DIAGNOSIS — N18.4 CHRONIC KIDNEY DISEASE, STAGE IV (SEVERE) (HCC): Primary | ICD-10-CM

## 2017-12-04 DIAGNOSIS — M89.9 BONE LESION: ICD-10-CM

## 2017-12-04 DIAGNOSIS — N18.4 CHRONIC KIDNEY DISEASE, STAGE IV (SEVERE) (HCC): ICD-10-CM

## 2017-12-04 LAB
ALBUMIN SERPL-MCNC: 4.2 G/DL (ref 3.5–5)
ANION GAP SERPL CALCULATED.3IONS-SCNC: 9 MMOL/L (ref 4–13)
BACTERIA UR QL AUTO: ABNORMAL /HPF
BILIRUB UR QL STRIP: NEGATIVE
BUN BLD-MCNC: 41 MG/DL (ref 5–21)
BUN/CREAT SERPL: 18.4 (ref 7–25)
CALCIUM SPEC-SCNC: 9.5 MG/DL (ref 8.4–10.4)
CHLORIDE SERPL-SCNC: 103 MMOL/L (ref 98–110)
CLARITY UR: CLEAR
CO2 SERPL-SCNC: 32 MMOL/L (ref 24–31)
COLOR UR: YELLOW
CREAT BLD-MCNC: 2.23 MG/DL (ref 0.5–1.4)
GFR SERPL CREATININE-BSD FRML MDRD: 29 ML/MIN/1.73
GLUCOSE BLD-MCNC: 121 MG/DL (ref 70–100)
GLUCOSE UR STRIP-MCNC: NEGATIVE MG/DL
HGB UR QL STRIP.AUTO: NEGATIVE
HYALINE CASTS UR QL AUTO: ABNORMAL /LPF
KETONES UR QL STRIP: NEGATIVE
LEUKOCYTE ESTERASE UR QL STRIP.AUTO: NEGATIVE
NITRITE UR QL STRIP: NEGATIVE
PH UR STRIP.AUTO: <=5 [PH] (ref 5–8)
PHOSPHATE SERPL-MCNC: 4.7 MG/DL (ref 2.5–4.5)
POTASSIUM BLD-SCNC: 4.5 MMOL/L (ref 3.5–5.3)
PROT UR QL STRIP: ABNORMAL
PTH-INTACT SERPL-MCNC: 71.7 PG/ML (ref 7.5–53.5)
RBC # UR: ABNORMAL /HPF
REF LAB TEST METHOD: ABNORMAL
SODIUM BLD-SCNC: 144 MMOL/L (ref 135–145)
SP GR UR STRIP: 1.01 (ref 1–1.03)
SQUAMOUS #/AREA URNS HPF: ABNORMAL /HPF
URATE SERPL-MCNC: 9.9 MG/DL (ref 3.5–8.5)
UROBILINOGEN UR QL STRIP: ABNORMAL
WBC UR QL AUTO: ABNORMAL /HPF

## 2017-12-04 PROCEDURE — 84550 ASSAY OF BLOOD/URIC ACID: CPT | Performed by: INTERNAL MEDICINE

## 2017-12-04 PROCEDURE — 81001 URINALYSIS AUTO W/SCOPE: CPT | Performed by: INTERNAL MEDICINE

## 2017-12-04 PROCEDURE — 80069 RENAL FUNCTION PANEL: CPT | Performed by: INTERNAL MEDICINE

## 2017-12-04 PROCEDURE — 36415 COLL VENOUS BLD VENIPUNCTURE: CPT

## 2017-12-04 PROCEDURE — 83970 ASSAY OF PARATHORMONE: CPT | Performed by: INTERNAL MEDICINE

## 2017-12-04 PROCEDURE — 74176 CT ABD & PELVIS W/O CONTRAST: CPT

## 2017-12-04 PROCEDURE — 71250 CT THORAX DX C-: CPT

## 2017-12-19 LAB
CYTO UR: NORMAL
LAB AP CASE REPORT: NORMAL
LAB AP DIAGNOSIS COMMENT: NORMAL
Lab: NORMAL
PATH REPORT.FINAL DX SPEC: NORMAL
PATH REPORT.GROSS SPEC: NORMAL

## 2018-01-23 ENCOUNTER — TRANSCRIBE ORDERS (OUTPATIENT)
Dept: ADMINISTRATIVE | Facility: HOSPITAL | Age: 73
End: 2018-01-23

## 2018-01-23 DIAGNOSIS — M89.9 BONE DISORDER: Primary | ICD-10-CM

## 2018-01-24 ENCOUNTER — HOSPITAL ENCOUNTER (OUTPATIENT)
Dept: CT IMAGING | Facility: HOSPITAL | Age: 73
Discharge: HOME | End: 2018-01-24
Attending: INTERNAL MEDICINE | Admitting: INTERNAL MEDICINE

## 2018-01-24 DIAGNOSIS — M89.9 BONE DISORDER: ICD-10-CM

## 2018-01-24 PROCEDURE — 73200 CT UPPER EXTREMITY W/O DYE: CPT

## 2018-03-23 ENCOUNTER — HOSPITAL ENCOUNTER (OUTPATIENT)
Dept: MRI IMAGING | Age: 73
Discharge: HOME | End: 2018-03-23
Payer: MEDICARE

## 2018-03-23 DIAGNOSIS — M54.16 RADICULOPATHY, LUMBAR REGION: ICD-10-CM

## 2018-03-23 PROCEDURE — 72148 MRI LUMBAR SPINE W/O DYE: CPT

## 2018-04-01 DIAGNOSIS — I10 ESSENTIAL HYPERTENSION: ICD-10-CM

## 2018-04-10 ENCOUNTER — OFFICE VISIT (OUTPATIENT)
Dept: CARDIOLOGY | Age: 73
End: 2018-04-10
Payer: MEDICARE

## 2018-04-10 VITALS
WEIGHT: 282 LBS | DIASTOLIC BLOOD PRESSURE: 62 MMHG | BODY MASS INDEX: 40.37 KG/M2 | HEIGHT: 70 IN | SYSTOLIC BLOOD PRESSURE: 130 MMHG | HEART RATE: 50 BPM

## 2018-04-10 DIAGNOSIS — I10 ESSENTIAL HYPERTENSION: Primary | ICD-10-CM

## 2018-04-10 DIAGNOSIS — Z95.2 S/P AORTIC VALVE REPLACEMENT WITH PROSTHETIC VALVE: ICD-10-CM

## 2018-04-10 PROCEDURE — G8417 CALC BMI ABV UP PARAM F/U: HCPCS | Performed by: INTERNAL MEDICINE

## 2018-04-10 PROCEDURE — G8427 DOCREV CUR MEDS BY ELIG CLIN: HCPCS | Performed by: INTERNAL MEDICINE

## 2018-04-10 PROCEDURE — 1123F ACP DISCUSS/DSCN MKR DOCD: CPT | Performed by: INTERNAL MEDICINE

## 2018-04-10 PROCEDURE — 1036F TOBACCO NON-USER: CPT | Performed by: INTERNAL MEDICINE

## 2018-04-10 PROCEDURE — 99214 OFFICE O/P EST MOD 30 MIN: CPT | Performed by: INTERNAL MEDICINE

## 2018-04-10 PROCEDURE — 3017F COLORECTAL CA SCREEN DOC REV: CPT | Performed by: INTERNAL MEDICINE

## 2018-04-10 PROCEDURE — 93000 ELECTROCARDIOGRAM COMPLETE: CPT | Performed by: INTERNAL MEDICINE

## 2018-04-10 PROCEDURE — G8598 ASA/ANTIPLAT THER USED: HCPCS | Performed by: INTERNAL MEDICINE

## 2018-04-10 PROCEDURE — 4040F PNEUMOC VAC/ADMIN/RCVD: CPT | Performed by: INTERNAL MEDICINE

## 2018-04-10 RX ORDER — SPIRONOLACTONE 25 MG/1
25 TABLET ORAL 2 TIMES DAILY
COMMUNITY
End: 2022-03-12

## 2018-04-10 RX ORDER — AMLODIPINE BESYLATE 5 MG/1
5 TABLET ORAL DAILY
COMMUNITY
End: 2022-03-12

## 2018-04-10 RX ORDER — FERROUS SULFATE 325(65) MG
325 TABLET ORAL
COMMUNITY
End: 2019-03-26 | Stop reason: ALTCHOICE

## 2018-04-10 RX ORDER — ALLOPURINOL 300 MG/1
300 TABLET ORAL DAILY
COMMUNITY

## 2018-04-10 ASSESSMENT — ENCOUNTER SYMPTOMS: SHORTNESS OF BREATH: 0

## 2018-07-30 ENCOUNTER — TELEPHONE (OUTPATIENT)
Dept: CARDIOLOGY | Age: 73
End: 2018-07-30

## 2018-07-30 NOTE — TELEPHONE ENCOUNTER
pt received letter regarding Coretta Minor, pt was a Dr. Oneyda Mcintyre pt (last seen in 2016) and wants to go back to [facility] Mcintyre with Coretta Minor leaving, please call to discuss [phone]

## 2018-07-31 ENCOUNTER — TELEPHONE (OUTPATIENT)
Dept: CARDIOLOGY | Age: 73
End: 2018-07-31

## 2019-02-26 ENCOUNTER — LAB REQUISITION (OUTPATIENT)
Dept: LAB | Facility: HOSPITAL | Age: 74
End: 2019-02-26

## 2019-02-26 DIAGNOSIS — Z00.00 ENCOUNTER FOR GENERAL ADULT MEDICAL EXAMINATION WITHOUT ABNORMAL FINDINGS: ICD-10-CM

## 2019-02-26 LAB
BASOPHILS # BLD AUTO: 0.04 10*3/MM3 (ref 0–0.2)
BASOPHILS NFR BLD AUTO: 0.6 % (ref 0–2)
DEPRECATED RDW RBC AUTO: 48.5 FL (ref 40–54)
EOSINOPHIL # BLD AUTO: 0.2 10*3/MM3 (ref 0–0.7)
EOSINOPHIL NFR BLD AUTO: 3 % (ref 0–4)
ERYTHROCYTE [DISTWIDTH] IN BLOOD BY AUTOMATED COUNT: 13.6 % (ref 12–15)
HCT VFR BLD AUTO: 38.5 % (ref 40–52)
HGB BLD-MCNC: 12.1 G/DL (ref 14–18)
IMM GRANULOCYTES # BLD AUTO: 0.03 10*3/MM3 (ref 0–0.05)
IMM GRANULOCYTES NFR BLD AUTO: 0.4 % (ref 0–5)
LYMPHOCYTES # BLD AUTO: 1.05 10*3/MM3 (ref 0.72–4.86)
LYMPHOCYTES NFR BLD AUTO: 15.5 % (ref 15–45)
MCH RBC QN AUTO: 30.6 PG (ref 28–32)
MCHC RBC AUTO-ENTMCNC: 31.4 G/DL (ref 33–36)
MCV RBC AUTO: 97.2 FL (ref 82–95)
MONOCYTES # BLD AUTO: 0.52 10*3/MM3 (ref 0.19–1.3)
MONOCYTES NFR BLD AUTO: 7.7 % (ref 4–12)
NEUTROPHILS # BLD AUTO: 4.92 10*3/MM3 (ref 1.87–8.4)
NEUTROPHILS NFR BLD AUTO: 72.8 % (ref 39–78)
NRBC BLD AUTO-RTO: 0 /100 WBC (ref 0–0)
PLATELET # BLD AUTO: 211 10*3/MM3 (ref 130–400)
PMV BLD AUTO: 11.1 FL (ref 6–12)
RBC # BLD AUTO: 3.96 10*6/MM3 (ref 4.8–5.9)
WBC NRBC COR # BLD: 6.76 10*3/MM3 (ref 4.8–10.8)

## 2019-02-26 PROCEDURE — 85025 COMPLETE CBC W/AUTO DIFF WBC: CPT | Performed by: INTERNAL MEDICINE

## 2019-03-12 ENCOUNTER — LAB REQUISITION (OUTPATIENT)
Dept: LAB | Facility: HOSPITAL | Age: 74
End: 2019-03-12

## 2019-03-12 DIAGNOSIS — D72.819 DECREASED WHITE BLOOD CELL COUNT: ICD-10-CM

## 2019-03-12 DIAGNOSIS — D63.1 ANEMIA IN CHRONIC KIDNEY DISEASE (CODE): ICD-10-CM

## 2019-03-12 DIAGNOSIS — D64.9 ANEMIA: ICD-10-CM

## 2019-03-12 LAB
ALBUMIN SERPL-MCNC: 4.2 G/DL (ref 3.5–5)
ALBUMIN/GLOB SERPL: 1.9 G/DL (ref 1.1–2.5)
ALP SERPL-CCNC: 102 U/L (ref 24–120)
ALT SERPL W P-5'-P-CCNC: 54 U/L (ref 0–54)
ANION GAP SERPL CALCULATED.3IONS-SCNC: 14 MMOL/L (ref 4–13)
AST SERPL-CCNC: 29 U/L (ref 7–45)
BILIRUB SERPL-MCNC: 0.6 MG/DL (ref 0.1–1)
BUN BLD-MCNC: 44 MG/DL (ref 5–21)
BUN/CREAT SERPL: 21.7 (ref 7–25)
CALCIUM SPEC-SCNC: 9.5 MG/DL (ref 8.4–10.4)
CHLORIDE SERPL-SCNC: 98 MMOL/L (ref 98–110)
CO2 SERPL-SCNC: 27 MMOL/L (ref 24–31)
CREAT BLD-MCNC: 2.03 MG/DL (ref 0.5–1.4)
FERRITIN SERPL-MCNC: 393 NG/ML (ref 17.9–464)
FOLATE SERPL-MCNC: 7.37 NG/ML (ref 4.78–24.2)
GFR SERPL CREATININE-BSD FRML MDRD: 32 ML/MIN/1.73
GLOBULIN UR ELPH-MCNC: 2.2 GM/DL
GLUCOSE BLD-MCNC: 191 MG/DL (ref 70–100)
IRON 24H UR-MRATE: 83 MCG/DL (ref 42–180)
IRON SATN MFR SERPL: 28 % (ref 20–45)
POTASSIUM BLD-SCNC: 4.4 MMOL/L (ref 3.5–5.3)
PROT SERPL-MCNC: 6.4 G/DL (ref 6.3–8.7)
SODIUM BLD-SCNC: 139 MMOL/L (ref 135–145)
TIBC SERPL-MCNC: 301 MCG/DL (ref 225–420)
VIT B12 BLD-MCNC: 369 PG/ML (ref 239–931)

## 2019-03-12 PROCEDURE — 84165 PROTEIN E-PHORESIS SERUM: CPT | Performed by: INTERNAL MEDICINE

## 2019-03-12 PROCEDURE — 82746 ASSAY OF FOLIC ACID SERUM: CPT | Performed by: INTERNAL MEDICINE

## 2019-03-12 PROCEDURE — 82784 ASSAY IGA/IGD/IGG/IGM EACH: CPT | Performed by: INTERNAL MEDICINE

## 2019-03-12 PROCEDURE — 80053 COMPREHEN METABOLIC PANEL: CPT | Performed by: INTERNAL MEDICINE

## 2019-03-12 PROCEDURE — 83540 ASSAY OF IRON: CPT | Performed by: INTERNAL MEDICINE

## 2019-03-12 PROCEDURE — 82728 ASSAY OF FERRITIN: CPT | Performed by: INTERNAL MEDICINE

## 2019-03-12 PROCEDURE — 82607 VITAMIN B-12: CPT | Performed by: INTERNAL MEDICINE

## 2019-03-12 PROCEDURE — 83550 IRON BINDING TEST: CPT | Performed by: INTERNAL MEDICINE

## 2019-03-12 PROCEDURE — 83883 ASSAY NEPHELOMETRY NOT SPEC: CPT | Performed by: INTERNAL MEDICINE

## 2019-03-12 PROCEDURE — 86334 IMMUNOFIX E-PHORESIS SERUM: CPT | Performed by: INTERNAL MEDICINE

## 2019-03-14 LAB
KAPPA LC SERPL-MCNC: 29.2 MG/L (ref 3.3–19.4)
KAPPA LC/LAMBDA SER: 2.16 {RATIO} (ref 0.26–1.65)
LAMBDA LC FREE SERPL-MCNC: 13.5 MG/L (ref 5.7–26.3)

## 2019-03-19 ENCOUNTER — LAB REQUISITION (OUTPATIENT)
Dept: LAB | Facility: HOSPITAL | Age: 74
End: 2019-03-19

## 2019-03-19 DIAGNOSIS — Z00.00 ENCOUNTER FOR GENERAL ADULT MEDICAL EXAMINATION WITHOUT ABNORMAL FINDINGS: ICD-10-CM

## 2019-03-19 PROCEDURE — 84155 ASSAY OF PROTEIN SERUM: CPT | Performed by: INTERNAL MEDICINE

## 2019-03-19 PROCEDURE — 84165 PROTEIN E-PHORESIS SERUM: CPT | Performed by: INTERNAL MEDICINE

## 2019-03-20 LAB
ALBUMIN SERPL-MCNC: 4 G/DL (ref 2.9–4.4)
ALBUMIN/GLOB SERPL: 1.6 {RATIO} (ref 0.7–1.7)
ALPHA1 GLOB FLD ELPH-MCNC: 0.2 G/DL (ref 0–0.4)
ALPHA2 GLOB SERPL ELPH-MCNC: 0.9 G/DL (ref 0.4–1)
B-GLOBULIN SERPL ELPH-MCNC: 1 G/DL (ref 0.7–1.3)
GAMMA GLOB SERPL ELPH-MCNC: 0.5 G/DL (ref 0.4–1.8)
GLOBULIN SER CALC-MCNC: 2.6 G/DL (ref 2.2–3.9)
IGA SERPL-MCNC: 144 MG/DL (ref 61–437)
IGG SERPL-MCNC: 536 MG/DL (ref 700–1600)
IGM SERPL-MCNC: 29 MG/DL (ref 15–143)
INTERPRETATION SERPL IEP-IMP: ABNORMAL
Lab: ABNORMAL
M-SPIKE: 0.2 G/DL
PROT SERPL-MCNC: 6.6 G/DL (ref 6–8.5)
SPECIMEN STATUS: NORMAL

## 2019-03-21 LAB
ALBUMIN SERPL-MCNC: 3.8 G/DL (ref 2.9–4.4)
ALBUMIN/GLOB SERPL: 1.3 {RATIO} (ref 0.7–1.7)
ALPHA1 GLOB FLD ELPH-MCNC: 0.2 G/DL (ref 0–0.4)
ALPHA2 GLOB SERPL ELPH-MCNC: 0.9 G/DL (ref 0.4–1)
B-GLOBULIN SERPL ELPH-MCNC: 1.2 G/DL (ref 0.7–1.3)
GAMMA GLOB SERPL ELPH-MCNC: 0.6 G/DL (ref 0.4–1.8)
GLOBULIN SER CALC-MCNC: 2.9 G/DL (ref 2.2–3.9)
Lab: ABNORMAL
M-SPIKE: 0.2 G/DL
PROT PATTERN SERPL ELPH-IMP: ABNORMAL
PROT SERPL-MCNC: 6.7 G/DL (ref 6–8.5)
SPECIMEN STATUS: NORMAL

## 2019-03-26 ENCOUNTER — OFFICE VISIT (OUTPATIENT)
Dept: CARDIOLOGY | Age: 74
End: 2019-03-26
Payer: MEDICARE

## 2019-03-26 VITALS
HEART RATE: 66 BPM | SYSTOLIC BLOOD PRESSURE: 122 MMHG | BODY MASS INDEX: 42.66 KG/M2 | WEIGHT: 298 LBS | DIASTOLIC BLOOD PRESSURE: 70 MMHG | HEIGHT: 70 IN

## 2019-03-26 DIAGNOSIS — I38 VALVULAR HEART DISEASE: Primary | ICD-10-CM

## 2019-03-26 DIAGNOSIS — E78.2 MIXED HYPERLIPIDEMIA: ICD-10-CM

## 2019-03-26 DIAGNOSIS — I25.10 CORONARY ARTERY DISEASE INVOLVING NATIVE CORONARY ARTERY OF NATIVE HEART WITHOUT ANGINA PECTORIS: ICD-10-CM

## 2019-03-26 DIAGNOSIS — I10 ESSENTIAL HYPERTENSION: ICD-10-CM

## 2019-03-26 DIAGNOSIS — Z95.2 S/P AVR: ICD-10-CM

## 2019-03-26 DIAGNOSIS — I50.32 CHRONIC DIASTOLIC CHF (CONGESTIVE HEART FAILURE) (HCC): ICD-10-CM

## 2019-03-26 PROCEDURE — 1123F ACP DISCUSS/DSCN MKR DOCD: CPT | Performed by: CLINICAL NURSE SPECIALIST

## 2019-03-26 PROCEDURE — 99213 OFFICE O/P EST LOW 20 MIN: CPT | Performed by: CLINICAL NURSE SPECIALIST

## 2019-03-26 PROCEDURE — G8598 ASA/ANTIPLAT THER USED: HCPCS | Performed by: CLINICAL NURSE SPECIALIST

## 2019-03-26 PROCEDURE — G8417 CALC BMI ABV UP PARAM F/U: HCPCS | Performed by: CLINICAL NURSE SPECIALIST

## 2019-03-26 PROCEDURE — G8484 FLU IMMUNIZE NO ADMIN: HCPCS | Performed by: CLINICAL NURSE SPECIALIST

## 2019-03-26 PROCEDURE — 4040F PNEUMOC VAC/ADMIN/RCVD: CPT | Performed by: CLINICAL NURSE SPECIALIST

## 2019-03-26 PROCEDURE — 1036F TOBACCO NON-USER: CPT | Performed by: CLINICAL NURSE SPECIALIST

## 2019-03-26 PROCEDURE — 3017F COLORECTAL CA SCREEN DOC REV: CPT | Performed by: CLINICAL NURSE SPECIALIST

## 2019-03-26 PROCEDURE — 1101F PT FALLS ASSESS-DOCD LE1/YR: CPT | Performed by: CLINICAL NURSE SPECIALIST

## 2019-03-26 PROCEDURE — G8427 DOCREV CUR MEDS BY ELIG CLIN: HCPCS | Performed by: CLINICAL NURSE SPECIALIST

## 2019-03-26 ASSESSMENT — ENCOUNTER SYMPTOMS
ABDOMINAL PAIN: 0
BACK PAIN: 1
EYE REDNESS: 0
CHEST TIGHTNESS: 0
SHORTNESS OF BREATH: 1
COUGH: 0
VOMITING: 0
WHEEZING: 0
FACIAL SWELLING: 0
NAUSEA: 0

## 2019-03-29 LAB — REF LAB TEST METHOD: NORMAL

## 2019-04-16 ENCOUNTER — LAB REQUISITION (OUTPATIENT)
Dept: LAB | Facility: HOSPITAL | Age: 74
End: 2019-04-16

## 2019-04-16 DIAGNOSIS — Z00.00 ENCOUNTER FOR GENERAL ADULT MEDICAL EXAMINATION WITHOUT ABNORMAL FINDINGS: ICD-10-CM

## 2019-04-16 PROCEDURE — 83883 ASSAY NEPHELOMETRY NOT SPEC: CPT | Performed by: INTERNAL MEDICINE

## 2019-04-16 PROCEDURE — 84155 ASSAY OF PROTEIN SERUM: CPT | Performed by: INTERNAL MEDICINE

## 2019-04-16 PROCEDURE — 84165 PROTEIN E-PHORESIS SERUM: CPT | Performed by: INTERNAL MEDICINE

## 2019-04-16 PROCEDURE — 82784 ASSAY IGA/IGD/IGG/IGM EACH: CPT | Performed by: INTERNAL MEDICINE

## 2019-04-16 PROCEDURE — 86334 IMMUNOFIX E-PHORESIS SERUM: CPT | Performed by: INTERNAL MEDICINE

## 2019-04-19 LAB
ALBUMIN SERPL-MCNC: 3.6 G/DL (ref 2.9–4.4)
ALBUMIN/GLOB SERPL: 1.4 {RATIO} (ref 0.7–1.7)
ALPHA1 GLOB FLD ELPH-MCNC: 0.2 G/DL (ref 0–0.4)
ALPHA2 GLOB SERPL ELPH-MCNC: 0.8 G/DL (ref 0.4–1)
B-GLOBULIN SERPL ELPH-MCNC: 1.2 G/DL (ref 0.7–1.3)
GAMMA GLOB SERPL ELPH-MCNC: 0.5 G/DL (ref 0.4–1.8)
GLOBULIN SER CALC-MCNC: 2.7 G/DL (ref 2.2–3.9)
IGA SERPL-MCNC: 161 MG/DL (ref 61–437)
IGG SERPL-MCNC: 529 MG/DL (ref 700–1600)
IGM SERPL-MCNC: 30 MG/DL (ref 15–143)
INTERPRETATION SERPL IEP-IMP: ABNORMAL
KAPPA LC SERPL-MCNC: 34.3 MG/L (ref 3.3–19.4)
KAPPA LC/LAMBDA SER: 2.18 {RATIO} (ref 0.26–1.65)
LAMBDA LC FREE SERPL-MCNC: 15.7 MG/L (ref 5.7–26.3)
Lab: ABNORMAL
M-SPIKE: 0.2 G/DL
PROT SERPL-MCNC: 6.3 G/DL (ref 6–8.5)

## 2019-05-14 ENCOUNTER — LAB REQUISITION (OUTPATIENT)
Dept: LAB | Facility: HOSPITAL | Age: 74
End: 2019-05-14

## 2019-05-14 DIAGNOSIS — Z00.00 ENCOUNTER FOR GENERAL ADULT MEDICAL EXAMINATION WITHOUT ABNORMAL FINDINGS: ICD-10-CM

## 2019-05-14 LAB
ALBUMIN SERPL-MCNC: 4.1 G/DL (ref 3.5–5)
ALBUMIN/GLOB SERPL: 1.9 G/DL (ref 1.1–2.5)
ALP SERPL-CCNC: 99 U/L (ref 24–120)
ALT SERPL W P-5'-P-CCNC: 47 U/L (ref 0–54)
ANION GAP SERPL CALCULATED.3IONS-SCNC: 11 MMOL/L (ref 4–13)
AST SERPL-CCNC: 29 U/L (ref 7–45)
BILIRUB SERPL-MCNC: 0.5 MG/DL (ref 0.1–1)
BUN BLD-MCNC: 33 MG/DL (ref 5–21)
BUN/CREAT SERPL: 18.4 (ref 7–25)
CALCIUM SPEC-SCNC: 9.5 MG/DL (ref 8.4–10.4)
CHLORIDE SERPL-SCNC: 99 MMOL/L (ref 98–110)
CO2 SERPL-SCNC: 28 MMOL/L (ref 24–31)
CREAT BLD-MCNC: 1.79 MG/DL (ref 0.5–1.4)
FERRITIN SERPL-MCNC: 356 NG/ML (ref 17.9–464)
GFR SERPL CREATININE-BSD FRML MDRD: 37 ML/MIN/1.73
GLOBULIN UR ELPH-MCNC: 2.2 GM/DL
GLUCOSE BLD-MCNC: 268 MG/DL (ref 70–100)
IRON 24H UR-MRATE: 91 MCG/DL (ref 42–180)
IRON SATN MFR SERPL: 30 % (ref 20–45)
POTASSIUM BLD-SCNC: 4.7 MMOL/L (ref 3.5–5.3)
PROT SERPL-MCNC: 6.3 G/DL (ref 6.3–8.7)
SODIUM BLD-SCNC: 138 MMOL/L (ref 135–145)
TIBC SERPL-MCNC: 301 MCG/DL (ref 225–420)

## 2019-05-14 PROCEDURE — 82728 ASSAY OF FERRITIN: CPT | Performed by: INTERNAL MEDICINE

## 2019-05-14 PROCEDURE — 83540 ASSAY OF IRON: CPT | Performed by: INTERNAL MEDICINE

## 2019-05-14 PROCEDURE — 83550 IRON BINDING TEST: CPT | Performed by: INTERNAL MEDICINE

## 2019-05-14 PROCEDURE — 80053 COMPREHEN METABOLIC PANEL: CPT | Performed by: INTERNAL MEDICINE

## 2019-07-02 ENCOUNTER — LAB REQUISITION (OUTPATIENT)
Dept: LAB | Facility: HOSPITAL | Age: 74
End: 2019-07-02

## 2019-07-02 DIAGNOSIS — Z00.00 ENCOUNTER FOR GENERAL ADULT MEDICAL EXAMINATION WITHOUT ABNORMAL FINDINGS: ICD-10-CM

## 2019-07-02 LAB
ALBUMIN SERPL-MCNC: 4.5 G/DL (ref 3.5–5)
ALBUMIN/GLOB SERPL: 2 G/DL (ref 1.1–2.5)
ALP SERPL-CCNC: 131 U/L (ref 24–120)
ALT SERPL W P-5'-P-CCNC: 51 U/L (ref 0–54)
ANION GAP SERPL CALCULATED.3IONS-SCNC: 13 MMOL/L (ref 4–13)
AST SERPL-CCNC: 28 U/L (ref 7–45)
BILIRUB SERPL-MCNC: 0.6 MG/DL (ref 0.1–1)
BUN BLD-MCNC: 32 MG/DL (ref 5–21)
BUN/CREAT SERPL: 17.6 (ref 7–25)
CALCIUM SPEC-SCNC: 9.4 MG/DL (ref 8.4–10.4)
CHLORIDE SERPL-SCNC: 97 MMOL/L (ref 98–110)
CO2 SERPL-SCNC: 27 MMOL/L (ref 24–31)
CREAT BLD-MCNC: 1.82 MG/DL (ref 0.5–1.4)
FERRITIN SERPL-MCNC: 343 NG/ML (ref 17.9–464)
FOLATE SERPL-MCNC: 8.11 NG/ML (ref 4.78–24.2)
GFR SERPL CREATININE-BSD FRML MDRD: 37 ML/MIN/1.73
GLOBULIN UR ELPH-MCNC: 2.2 GM/DL
GLUCOSE BLD-MCNC: 328 MG/DL (ref 70–100)
IRON 24H UR-MRATE: 73 MCG/DL (ref 42–180)
IRON SATN MFR SERPL: 24 % (ref 20–45)
POTASSIUM BLD-SCNC: 4.5 MMOL/L (ref 3.5–5.3)
PROT SERPL-MCNC: 6.7 G/DL (ref 6.3–8.7)
SODIUM BLD-SCNC: 137 MMOL/L (ref 135–145)
TIBC SERPL-MCNC: 302 MCG/DL (ref 225–420)
VIT B12 BLD-MCNC: 406 PG/ML (ref 239–931)

## 2019-07-02 PROCEDURE — 82728 ASSAY OF FERRITIN: CPT | Performed by: INTERNAL MEDICINE

## 2019-07-02 PROCEDURE — 82746 ASSAY OF FOLIC ACID SERUM: CPT | Performed by: INTERNAL MEDICINE

## 2019-07-02 PROCEDURE — 80053 COMPREHEN METABOLIC PANEL: CPT | Performed by: INTERNAL MEDICINE

## 2019-07-02 PROCEDURE — 84165 PROTEIN E-PHORESIS SERUM: CPT | Performed by: INTERNAL MEDICINE

## 2019-07-02 PROCEDURE — 83550 IRON BINDING TEST: CPT | Performed by: INTERNAL MEDICINE

## 2019-07-02 PROCEDURE — 86334 IMMUNOFIX E-PHORESIS SERUM: CPT | Performed by: INTERNAL MEDICINE

## 2019-07-02 PROCEDURE — 82784 ASSAY IGA/IGD/IGG/IGM EACH: CPT | Performed by: INTERNAL MEDICINE

## 2019-07-02 PROCEDURE — 83883 ASSAY NEPHELOMETRY NOT SPEC: CPT | Performed by: INTERNAL MEDICINE

## 2019-07-02 PROCEDURE — 83540 ASSAY OF IRON: CPT | Performed by: INTERNAL MEDICINE

## 2019-07-02 PROCEDURE — 82607 VITAMIN B-12: CPT | Performed by: INTERNAL MEDICINE

## 2019-07-05 LAB
ALBUMIN SERPL-MCNC: 3.6 G/DL (ref 2.9–4.4)
ALBUMIN/GLOB SERPL: 1.3 {RATIO} (ref 0.7–1.7)
ALPHA1 GLOB FLD ELPH-MCNC: 0.2 G/DL (ref 0–0.4)
ALPHA2 GLOB SERPL ELPH-MCNC: 0.9 G/DL (ref 0.4–1)
B-GLOBULIN SERPL ELPH-MCNC: 1.1 G/DL (ref 0.7–1.3)
GAMMA GLOB SERPL ELPH-MCNC: 0.6 G/DL (ref 0.4–1.8)
GLOBULIN SER CALC-MCNC: 2.9 G/DL (ref 2.2–3.9)
IGA SERPL-MCNC: 162 MG/DL (ref 61–437)
IGG SERPL-MCNC: 550 MG/DL (ref 700–1600)
IGM SERPL-MCNC: 39 MG/DL (ref 15–143)
INTERPRETATION SERPL IEP-IMP: ABNORMAL
KAPPA LC SERPL-MCNC: 36.9 MG/L (ref 3.3–19.4)
KAPPA LC/LAMBDA SER: 2.05 {RATIO} (ref 0.26–1.65)
LAMBDA LC FREE SERPL-MCNC: 18 MG/L (ref 5.7–26.3)
Lab: ABNORMAL
M-SPIKE: 0.3 G/DL
PROT SERPL-MCNC: 6.5 G/DL (ref 6–8.5)

## 2019-08-06 ENCOUNTER — LAB REQUISITION (OUTPATIENT)
Dept: LAB | Facility: HOSPITAL | Age: 74
End: 2019-08-06

## 2019-08-06 DIAGNOSIS — Z00.00 ENCOUNTER FOR GENERAL ADULT MEDICAL EXAMINATION WITHOUT ABNORMAL FINDINGS: ICD-10-CM

## 2019-08-06 LAB
ALBUMIN SERPL-MCNC: 4.3 G/DL (ref 3.5–5)
ALBUMIN/GLOB SERPL: 1.7 G/DL (ref 1.1–2.5)
ALP SERPL-CCNC: 165 U/L (ref 24–120)
ALT SERPL W P-5'-P-CCNC: 60 U/L (ref 0–54)
ANION GAP SERPL CALCULATED.3IONS-SCNC: 13 MMOL/L (ref 4–13)
AST SERPL-CCNC: 31 U/L (ref 7–45)
BILIRUB SERPL-MCNC: 0.5 MG/DL (ref 0.1–1)
BUN BLD-MCNC: 38 MG/DL (ref 5–21)
BUN/CREAT SERPL: 20.1 (ref 7–25)
CALCIUM SPEC-SCNC: 9.6 MG/DL (ref 8.4–10.4)
CHLORIDE SERPL-SCNC: 95 MMOL/L (ref 98–110)
CO2 SERPL-SCNC: 25 MMOL/L (ref 24–31)
CREAT BLD-MCNC: 1.89 MG/DL (ref 0.5–1.4)
FERRITIN SERPL-MCNC: 373 NG/ML (ref 17.9–464)
GFR SERPL CREATININE-BSD FRML MDRD: 35 ML/MIN/1.73
GLOBULIN UR ELPH-MCNC: 2.5 GM/DL
GLUCOSE BLD-MCNC: 496 MG/DL (ref 70–100)
IRON 24H UR-MRATE: 79 MCG/DL (ref 42–180)
IRON SATN MFR SERPL: 24 % (ref 20–45)
POTASSIUM BLD-SCNC: 4.6 MMOL/L (ref 3.5–5.3)
PROT SERPL-MCNC: 6.8 G/DL (ref 6.3–8.7)
SODIUM BLD-SCNC: 133 MMOL/L (ref 135–145)
TIBC SERPL-MCNC: 323 MCG/DL (ref 225–420)

## 2019-08-06 PROCEDURE — 80053 COMPREHEN METABOLIC PANEL: CPT | Performed by: INTERNAL MEDICINE

## 2019-08-06 PROCEDURE — 82728 ASSAY OF FERRITIN: CPT | Performed by: INTERNAL MEDICINE

## 2019-08-06 PROCEDURE — 83550 IRON BINDING TEST: CPT | Performed by: INTERNAL MEDICINE

## 2019-08-06 PROCEDURE — 83540 ASSAY OF IRON: CPT | Performed by: INTERNAL MEDICINE

## 2019-09-03 ENCOUNTER — LAB (OUTPATIENT)
Dept: ONCOLOGY | Facility: CLINIC | Age: 74
End: 2019-09-03

## 2019-09-03 DIAGNOSIS — N18.30 ANEMIA DUE TO STAGE 3 CHRONIC KIDNEY DISEASE (HCC): ICD-10-CM

## 2019-09-03 DIAGNOSIS — N18.30 ANEMIA DUE TO STAGE 3 CHRONIC KIDNEY DISEASE (HCC): Primary | ICD-10-CM

## 2019-09-03 DIAGNOSIS — D63.1 ANEMIA DUE TO STAGE 3 CHRONIC KIDNEY DISEASE (HCC): Primary | ICD-10-CM

## 2019-09-03 DIAGNOSIS — D63.1 ANEMIA DUE TO STAGE 3 CHRONIC KIDNEY DISEASE (HCC): ICD-10-CM

## 2019-09-03 PROCEDURE — 85025 COMPLETE CBC W/AUTO DIFF WBC: CPT | Performed by: INTERNAL MEDICINE

## 2019-09-03 PROCEDURE — 36415 COLL VENOUS BLD VENIPUNCTURE: CPT | Performed by: INTERNAL MEDICINE

## 2019-09-04 LAB
BASOPHILS # BLD AUTO: 0.03 10*3/MM3 (ref 0–0.2)
BASOPHILS NFR BLD AUTO: 0.5 % (ref 0–1.5)
DEPRECATED RDW RBC AUTO: 43.5 FL (ref 37–54)
EOSINOPHIL # BLD AUTO: 0.2 10*3/MM3 (ref 0–0.4)
EOSINOPHIL NFR BLD AUTO: 3.4 % (ref 0.3–6.2)
ERYTHROCYTE [DISTWIDTH] IN BLOOD BY AUTOMATED COUNT: 12.7 % (ref 12.3–15.4)
HCT VFR BLD AUTO: 40.1 % (ref 37.5–51)
HGB BLD-MCNC: 13 G/DL (ref 13–17.7)
IMM GRANULOCYTES # BLD AUTO: 0.03 10*3/MM3 (ref 0–0.05)
IMM GRANULOCYTES NFR BLD AUTO: 0.5 % (ref 0–0.5)
LYMPHOCYTES # BLD AUTO: 1.17 10*3/MM3 (ref 0.7–3.1)
LYMPHOCYTES NFR BLD AUTO: 19.7 % (ref 19.6–45.3)
MCH RBC QN AUTO: 29.8 PG (ref 26.6–33)
MCHC RBC AUTO-ENTMCNC: 32.4 G/DL (ref 31.5–35.7)
MCV RBC AUTO: 92 FL (ref 79–97)
MONOCYTES # BLD AUTO: 0.52 10*3/MM3 (ref 0.1–0.9)
MONOCYTES NFR BLD AUTO: 8.8 % (ref 5–12)
NEUTROPHILS # BLD AUTO: 3.99 10*3/MM3 (ref 1.7–7)
NEUTROPHILS NFR BLD AUTO: 67.1 % (ref 42.7–76)
PLATELET # BLD AUTO: 201 10*3/MM3 (ref 140–450)
PMV BLD AUTO: 9.8 FL (ref 6–12)
RBC # BLD AUTO: 4.36 10*6/MM3 (ref 4.14–5.8)
WBC NRBC COR # BLD: 5.94 10*3/MM3 (ref 3.4–10.8)

## 2019-10-01 ENCOUNTER — LAB (OUTPATIENT)
Dept: ONCOLOGY | Facility: CLINIC | Age: 74
End: 2019-10-01

## 2019-10-01 DIAGNOSIS — N18.30 ANEMIA DUE TO STAGE 3 CHRONIC KIDNEY DISEASE (HCC): ICD-10-CM

## 2019-10-01 DIAGNOSIS — D63.1 ANEMIA DUE TO STAGE 3 CHRONIC KIDNEY DISEASE (HCC): ICD-10-CM

## 2019-10-01 LAB
BASOPHILS # BLD AUTO: 0.02 10*3/MM3 (ref 0–0.2)
BASOPHILS NFR BLD AUTO: 0.4 % (ref 0–1.5)
DEPRECATED RDW RBC AUTO: 45.9 FL (ref 37–54)
EOSINOPHIL # BLD AUTO: 0.14 10*3/MM3 (ref 0–0.4)
EOSINOPHIL NFR BLD AUTO: 2.5 % (ref 0.3–6.2)
ERYTHROCYTE [DISTWIDTH] IN BLOOD BY AUTOMATED COUNT: 13 % (ref 12.3–15.4)
HCT VFR BLD AUTO: 41.2 % (ref 37.5–51)
HGB BLD-MCNC: 13 G/DL (ref 13–17.7)
IMM GRANULOCYTES # BLD AUTO: 0.01 10*3/MM3 (ref 0–0.05)
IMM GRANULOCYTES NFR BLD AUTO: 0.2 % (ref 0–0.5)
LYMPHOCYTES # BLD AUTO: 1.25 10*3/MM3 (ref 0.7–3.1)
LYMPHOCYTES NFR BLD AUTO: 21.9 % (ref 19.6–45.3)
MCH RBC QN AUTO: 29.8 PG (ref 26.6–33)
MCHC RBC AUTO-ENTMCNC: 31.6 G/DL (ref 31.5–35.7)
MCV RBC AUTO: 94.5 FL (ref 79–97)
MONOCYTES # BLD AUTO: 0.47 10*3/MM3 (ref 0.1–0.9)
MONOCYTES NFR BLD AUTO: 8.2 % (ref 5–12)
NEUTROPHILS # BLD AUTO: 3.81 10*3/MM3 (ref 1.7–7)
NEUTROPHILS NFR BLD AUTO: 66.8 % (ref 42.7–76)
PLATELET # BLD AUTO: 198 10*3/MM3 (ref 140–450)
PMV BLD AUTO: 9.8 FL (ref 6–12)
RBC # BLD AUTO: 4.36 10*6/MM3 (ref 4.14–5.8)
WBC NRBC COR # BLD: 5.7 10*3/MM3 (ref 3.4–10.8)

## 2019-10-01 PROCEDURE — 36415 COLL VENOUS BLD VENIPUNCTURE: CPT | Performed by: INTERNAL MEDICINE

## 2019-10-01 PROCEDURE — 85025 COMPLETE CBC W/AUTO DIFF WBC: CPT | Performed by: INTERNAL MEDICINE

## 2019-10-28 ENCOUNTER — LAB (OUTPATIENT)
Dept: ONCOLOGY | Facility: CLINIC | Age: 74
End: 2019-10-28

## 2019-10-28 ENCOUNTER — CLINICAL SUPPORT (OUTPATIENT)
Dept: ONCOLOGY | Facility: CLINIC | Age: 74
End: 2019-10-28

## 2019-10-28 VITALS
TEMPERATURE: 98 F | OXYGEN SATURATION: 97 % | HEART RATE: 72 BPM | SYSTOLIC BLOOD PRESSURE: 122 MMHG | RESPIRATION RATE: 18 BRPM | DIASTOLIC BLOOD PRESSURE: 70 MMHG

## 2019-10-28 DIAGNOSIS — D47.2 MGUS (MONOCLONAL GAMMOPATHY OF UNKNOWN SIGNIFICANCE): Primary | ICD-10-CM

## 2019-10-28 DIAGNOSIS — N18.30 STAGE 3 CHRONIC KIDNEY DISEASE (HCC): ICD-10-CM

## 2019-10-28 DIAGNOSIS — N18.30 ANEMIA IN STAGE 3 CHRONIC KIDNEY DISEASE (HCC): ICD-10-CM

## 2019-10-28 DIAGNOSIS — D63.1 ANEMIA IN STAGE 3 CHRONIC KIDNEY DISEASE (HCC): ICD-10-CM

## 2019-10-28 DIAGNOSIS — D63.1 ANEMIA DUE TO STAGE 3 CHRONIC KIDNEY DISEASE (HCC): ICD-10-CM

## 2019-10-28 DIAGNOSIS — N18.30 ANEMIA DUE TO STAGE 3 CHRONIC KIDNEY DISEASE (HCC): ICD-10-CM

## 2019-10-28 LAB
BASOPHILS # BLD AUTO: 0.04 10*3/MM3 (ref 0–0.2)
BASOPHILS NFR BLD AUTO: 0.7 % (ref 0–1.5)
DEPRECATED RDW RBC AUTO: 45.8 FL (ref 37–54)
EOSINOPHIL # BLD AUTO: 0.18 10*3/MM3 (ref 0–0.4)
EOSINOPHIL NFR BLD AUTO: 3.3 % (ref 0.3–6.2)
ERYTHROCYTE [DISTWIDTH] IN BLOOD BY AUTOMATED COUNT: 13.1 % (ref 12.3–15.4)
HCT VFR BLD AUTO: 39.5 % (ref 37.5–51)
HGB BLD-MCNC: 12.7 G/DL (ref 13–17.7)
IMM GRANULOCYTES # BLD AUTO: 0 10*3/MM3 (ref 0–0.05)
IMM GRANULOCYTES NFR BLD AUTO: 0 % (ref 0–0.5)
LYMPHOCYTES # BLD AUTO: 1.23 10*3/MM3 (ref 0.7–3.1)
LYMPHOCYTES NFR BLD AUTO: 22.6 % (ref 19.6–45.3)
MCH RBC QN AUTO: 30.3 PG (ref 26.6–33)
MCHC RBC AUTO-ENTMCNC: 32.2 G/DL (ref 31.5–35.7)
MCV RBC AUTO: 94.3 FL (ref 79–97)
MONOCYTES # BLD AUTO: 0.54 10*3/MM3 (ref 0.1–0.9)
MONOCYTES NFR BLD AUTO: 9.9 % (ref 5–12)
NEUTROPHILS # BLD AUTO: 3.45 10*3/MM3 (ref 1.7–7)
NEUTROPHILS NFR BLD AUTO: 63.5 % (ref 42.7–76)
PLATELET # BLD AUTO: 175 10*3/MM3 (ref 140–450)
PMV BLD AUTO: 9.9 FL (ref 6–12)
RBC # BLD AUTO: 4.19 10*6/MM3 (ref 4.14–5.8)
WBC NRBC COR # BLD: 5.44 10*3/MM3 (ref 3.4–10.8)

## 2019-10-28 PROCEDURE — 85025 COMPLETE CBC W/AUTO DIFF WBC: CPT | Performed by: INTERNAL MEDICINE

## 2019-10-28 PROCEDURE — 36415 COLL VENOUS BLD VENIPUNCTURE: CPT | Performed by: INTERNAL MEDICINE

## 2019-10-28 PROCEDURE — 99211 OFF/OP EST MAY X REQ PHY/QHP: CPT | Performed by: INTERNAL MEDICINE

## 2019-10-28 NOTE — PROGRESS NOTES
"Patient at clinic for lab evaluation for stage III chronic kidney disease.  Patient received Procrit injections in 2018 with the Procrit injection being given in 10/2018.  Patient states that he tolerated them well without side effects.  States that he had gone through heart valve replacement and was put on some medication that \"his kidneys did not like.\"  States that since October his blood work has been good and Dr Pena has told him that his kidney function has improved.  Reviewed labs with patient.  Hgb 12.7, Hct 39.5.  Patient has been coming monthly for lab work.  Will make next appointment for six weeks since he has a follow up here with md at that time. Instructed to call with any problems.  Patient v/u.   "

## 2019-12-03 ENCOUNTER — TELEPHONE (OUTPATIENT)
Dept: CARDIOLOGY | Age: 74
End: 2019-12-03

## 2019-12-09 ENCOUNTER — LAB (OUTPATIENT)
Dept: ONCOLOGY | Facility: CLINIC | Age: 74
End: 2019-12-09

## 2019-12-09 ENCOUNTER — TELEPHONE (OUTPATIENT)
Dept: NEUROSURGERY | Age: 74
End: 2019-12-09

## 2019-12-09 ENCOUNTER — CLINICAL SUPPORT (OUTPATIENT)
Dept: ONCOLOGY | Facility: CLINIC | Age: 74
End: 2019-12-09

## 2019-12-09 VITALS
HEART RATE: 56 BPM | OXYGEN SATURATION: 95 % | SYSTOLIC BLOOD PRESSURE: 128 MMHG | DIASTOLIC BLOOD PRESSURE: 74 MMHG | RESPIRATION RATE: 16 BRPM | TEMPERATURE: 98.3 F

## 2019-12-09 DIAGNOSIS — N18.30 ANEMIA DUE TO STAGE 3 CHRONIC KIDNEY DISEASE (HCC): ICD-10-CM

## 2019-12-09 DIAGNOSIS — N18.30 STAGE 3 CHRONIC KIDNEY DISEASE (HCC): ICD-10-CM

## 2019-12-09 DIAGNOSIS — D47.2 MGUS (MONOCLONAL GAMMOPATHY OF UNKNOWN SIGNIFICANCE): ICD-10-CM

## 2019-12-09 DIAGNOSIS — D63.1 ANEMIA DUE TO STAGE 3 CHRONIC KIDNEY DISEASE (HCC): ICD-10-CM

## 2019-12-09 LAB
ALBUMIN SERPL-MCNC: 4.5 G/DL (ref 3.5–5.2)
ALBUMIN/GLOB SERPL: 1.7 G/DL
ALP SERPL-CCNC: 111 U/L (ref 39–117)
ALT SERPL W P-5'-P-CCNC: 28 U/L (ref 1–41)
ANION GAP SERPL CALCULATED.3IONS-SCNC: 15 MMOL/L (ref 5–15)
AST SERPL-CCNC: 18 U/L (ref 1–40)
BASOPHILS # BLD AUTO: 0.03 10*3/MM3 (ref 0–0.2)
BASOPHILS NFR BLD AUTO: 0.6 % (ref 0–1.5)
BILIRUB SERPL-MCNC: 0.3 MG/DL (ref 0.2–1.2)
BUN BLD-MCNC: 39 MG/DL (ref 8–23)
BUN/CREAT SERPL: 19.3 (ref 7–25)
CALCIUM SPEC-SCNC: 9.4 MG/DL (ref 8.6–10.5)
CHLORIDE SERPL-SCNC: 99 MMOL/L (ref 98–107)
CO2 SERPL-SCNC: 28 MMOL/L (ref 22–29)
CREAT BLD-MCNC: 2.02 MG/DL (ref 0.76–1.27)
DEPRECATED RDW RBC AUTO: 46.1 FL (ref 37–54)
EOSINOPHIL # BLD AUTO: 0.2 10*3/MM3 (ref 0–0.4)
EOSINOPHIL NFR BLD AUTO: 3.7 % (ref 0.3–6.2)
ERYTHROCYTE [DISTWIDTH] IN BLOOD BY AUTOMATED COUNT: 13.1 % (ref 12.3–15.4)
FERRITIN SERPL-MCNC: 445.6 NG/ML (ref 30–400)
GFR SERPL CREATININE-BSD FRML MDRD: 33 ML/MIN/1.73
GLOBULIN UR ELPH-MCNC: 2.7 GM/DL
GLUCOSE BLD-MCNC: 202 MG/DL (ref 65–99)
HCT VFR BLD AUTO: 39.6 % (ref 37.5–51)
HGB BLD-MCNC: 12.8 G/DL (ref 13–17.7)
IMM GRANULOCYTES # BLD AUTO: 0.02 10*3/MM3 (ref 0–0.05)
IMM GRANULOCYTES NFR BLD AUTO: 0.4 % (ref 0–0.5)
IRON 24H UR-MRATE: 61 MCG/DL (ref 59–158)
IRON SATN MFR SERPL: 17 % (ref 20–50)
LYMPHOCYTES # BLD AUTO: 1.02 10*3/MM3 (ref 0.7–3.1)
LYMPHOCYTES NFR BLD AUTO: 19 % (ref 19.6–45.3)
MCH RBC QN AUTO: 30.5 PG (ref 26.6–33)
MCHC RBC AUTO-ENTMCNC: 32.3 G/DL (ref 31.5–35.7)
MCV RBC AUTO: 94.5 FL (ref 79–97)
MONOCYTES # BLD AUTO: 0.42 10*3/MM3 (ref 0.1–0.9)
MONOCYTES NFR BLD AUTO: 7.8 % (ref 5–12)
NEUTROPHILS # BLD AUTO: 3.67 10*3/MM3 (ref 1.7–7)
NEUTROPHILS NFR BLD AUTO: 68.5 % (ref 42.7–76)
PLATELET # BLD AUTO: 189 10*3/MM3 (ref 140–450)
PMV BLD AUTO: 9.6 FL (ref 6–12)
POTASSIUM BLD-SCNC: 3.8 MMOL/L (ref 3.5–5.2)
PROT SERPL-MCNC: 7.2 G/DL (ref 6–8.5)
RBC # BLD AUTO: 4.19 10*6/MM3 (ref 4.14–5.8)
SODIUM BLD-SCNC: 142 MMOL/L (ref 136–145)
TIBC SERPL-MCNC: 365 MCG/DL (ref 298–536)
TRANSFERRIN SERPL-MCNC: 245 MG/DL (ref 200–360)
WBC NRBC COR # BLD: 5.36 10*3/MM3 (ref 3.4–10.8)

## 2019-12-09 PROCEDURE — 84155 ASSAY OF PROTEIN SERUM: CPT | Performed by: INTERNAL MEDICINE

## 2019-12-09 PROCEDURE — 84165 PROTEIN E-PHORESIS SERUM: CPT | Performed by: INTERNAL MEDICINE

## 2019-12-09 PROCEDURE — 82607 VITAMIN B-12: CPT | Performed by: INTERNAL MEDICINE

## 2019-12-09 PROCEDURE — 36415 COLL VENOUS BLD VENIPUNCTURE: CPT | Performed by: INTERNAL MEDICINE

## 2019-12-09 PROCEDURE — 82746 ASSAY OF FOLIC ACID SERUM: CPT | Performed by: INTERNAL MEDICINE

## 2019-12-09 PROCEDURE — 85025 COMPLETE CBC W/AUTO DIFF WBC: CPT | Performed by: INTERNAL MEDICINE

## 2019-12-09 PROCEDURE — 99211 OFF/OP EST MAY X REQ PHY/QHP: CPT | Performed by: INTERNAL MEDICINE

## 2019-12-09 PROCEDURE — 80053 COMPREHEN METABOLIC PANEL: CPT | Performed by: INTERNAL MEDICINE

## 2019-12-09 PROCEDURE — 82728 ASSAY OF FERRITIN: CPT | Performed by: INTERNAL MEDICINE

## 2019-12-09 PROCEDURE — 83883 ASSAY NEPHELOMETRY NOT SPEC: CPT | Performed by: INTERNAL MEDICINE

## 2019-12-09 PROCEDURE — 83540 ASSAY OF IRON: CPT | Performed by: INTERNAL MEDICINE

## 2019-12-09 PROCEDURE — 84466 ASSAY OF TRANSFERRIN: CPT | Performed by: INTERNAL MEDICINE

## 2019-12-09 NOTE — PROGRESS NOTES
Patient here for lab evaluation and possible Procrit injection due to stage III chronic kidney disease.  States that he is feeling well today.  No c/o voiced.  Skin w/d to touch.  Color wnl.  Eating and drinking well.  Parameters set for Procrit 40,000 units if Hgb <11 or Hct <33.  Patient has not had an injection since 10/2018 so he would need to follow parameters for initiation of Procrit since it has been over 1 year.  Reviewed labs with patient, Hgb 12.8 and Hct 39.6-patient does not require an injection at this time.  Patient is wanting to only have lab work done at scheduled visits with  MD since his blood counts have returned to normal and have stayed normal.  Has an appointment with Dr White next week and will discuss it with him at that time.  Instructed to call with any problems.  Patient v/u.

## 2019-12-10 LAB
ALBUMIN SERPL-MCNC: 3.7 G/DL (ref 2.9–4.4)
ALBUMIN/GLOB SERPL: 1.2 {RATIO} (ref 0.7–1.7)
ALPHA1 GLOB FLD ELPH-MCNC: 0.2 G/DL (ref 0–0.4)
ALPHA2 GLOB SERPL ELPH-MCNC: 0.9 G/DL (ref 0.4–1)
B-GLOBULIN SERPL ELPH-MCNC: 1.2 G/DL (ref 0.7–1.3)
FOLATE SERPL-MCNC: 5.47 NG/ML (ref 4.78–24.2)
GAMMA GLOB SERPL ELPH-MCNC: 0.7 G/DL (ref 0.4–1.8)
GLOBULIN SER CALC-MCNC: 3.1 G/DL (ref 2.2–3.9)
Lab: ABNORMAL
M-SPIKE: 0.2 G/DL
PROT PATTERN SERPL ELPH-IMP: ABNORMAL
PROT SERPL-MCNC: 6.8 G/DL (ref 6–8.5)
VIT B12 BLD-MCNC: 313 PG/ML (ref 211–946)

## 2019-12-11 LAB
KAPPA LC SERPL-MCNC: 34.7 MG/L (ref 3.3–19.4)
KAPPA LC/LAMBDA SER: 2.02 {RATIO} (ref 0.26–1.65)
LAMBDA LC FREE SERPL-MCNC: 17.2 MG/L (ref 5.7–26.3)

## 2019-12-13 PROBLEM — D47.2 MGUS (MONOCLONAL GAMMOPATHY OF UNKNOWN SIGNIFICANCE): Status: ACTIVE | Noted: 2019-12-13

## 2019-12-14 NOTE — PROGRESS NOTES
MGW ONC [facility] ONCOLOGY  [address] [place] [address] [address]  [facility] [address]  [phone]    Patient Name: Nicholas Moreau  Encounter Date: 12/17/2019  YOB: 1945  Patient Number: [id number]      REASON FOR VISIT: Mr. Nicholas Moreau is a 73-year-old medical patient of Dr. Stuart Davis in [place] but is also followed by Dr. Queen of nephrology for his history of Stage IV chronic kidney disease and acute tubular necrosis. He is seen in followup of anemia and monoclonal gammopathy of undetermined significance (MGUS). He has been on Procrit and B12 beginning 10/09/2017. He is here with his spouse, Alona. History is obtained from the patient who is considered reliable.     DIAGNOSTIC ABNORMALITIES:   1. Review of the only available labs from the referring office dates back to 08/11/2017 when the hemoglobin was 8.6, hematocrit 25.2, MCV 84, platelets 193,000, WBC 3.9 with 57 segs, 32 lymphocytes, 8 monocytes, 2 eosinophils (ANC 2.2). Accompanying serum iron was 84, iron saturation 31%, ferritin 459, B12 of 427, folic acid 7.3, TIBC 273 (each within reference range).  2. When last seen by Dr. Queen on 09/20/2017, labs from 08/10/2017 were referenced that showed a creatinine of 3.1, BUN 63 (GFR not given), hemoglobin 8.6.   3. Labs, 10/30/2017: Hemoglobin 8.8, hematocrit 24.4, MCV 90.3, platelets 252,000, WBC 5.7 with a normal differential. CMP glucose 171, BUN 72, creatinine 2.6, , uric acid 14.9, phosphorus 5.8 (each elevated), GFR 26 otherwise normal, calcium 8.6, and normal liver enzymes. Serum iron was 79, saturation 22.07, ferritin 380, TIBC 358 (each normal), TSH 2.6 (within reference range), T4 of 15.9 (4.9 - 12.3). TONNY negative. SPIEP: IgG 503 (791 - 1643), IgA 190 (66 - 436), IgM 43 (43 - 279). Immunofixation electrophoresis: Faint IgG lambda monoclonal immunoglobulin. Monoclonal immunoglobulin concentration too low to quantify by densitometry.  4. Labs,  11/16/2017: UPIEP with 24-hour urine protein of 1799. Immunofixation: No monoclonality detected.  5. Bone survey obtained at [facility] on 11/22/2017 reveals: 1 cm lytic lesion observed in the proximal right humerus, significance uncertain. Correlate with patient presentation and lab values. Degenerative changes diffusely in the cervical, thoracic, and lumbar spine. Degenerative changes in the hip joints, knees, and hands. No definite additional lytic lesions in the axial or appendicular skeleton.   6. Labs, 11/14/2017: Carnot-Moon free light chains (serum) 4.31 (0.3 - 1.94), lambda free light chains 1.84 (0.57 - 2.63), kappa/lambda light chain ratio 2.34 (0.26 - 1.65).  7. Bone marrow biopsy, 12/01/2017: Final Diagnosis. Bone marrow, biopsy: A) Normocellular bone marrow for the patient's age with maturing trilineage hematopoiesis. B) Mild leukopenia by hematologic data. C) No atypical lymphoid or plasmacytoid aggregates identified. D) Negative for evidence of metastatic malignancy or granulomatous disease. E) Negative for increased blasts. F) Decreased stainable iron by special stain (1+ on a scale of 1-5).   8. CT chest, abdomen, pelvis without contrast, 12/04/2017, [facility]. Impression chest: No evidence of acute cardiothoracic process. Impression abdomen/pelvis: No evidence of acute abdominopelvic process; sclerotic change head of left femur which may represent early avascular necrosis.   9. CT upper right extremity obtained at [facility] 01/24/2018 reveals no worrisome bony lesions are seen in the RIGHT humerus. The cortical defect in the proximal humerus is consistent with a previous biceps tenodesis. Moderate degenerative changes in the acromioclavicular and glenohumeral joints. This is in comparison to bone survey 11/22/2017.  10. EGD, 03/27/2018 (Dr. Hahn): Impressions: 1) Hiatal hernia. 2) Distal esophageal stricture. 3) Status post esophageal dilation using Savary dilators  over an endoscopically placed guidewire. Path: H pylori negative.   11. Colonoscopy, 03/27/2018 (Dr. Hahn). Impressions: 1) Diverticulosis. 2) Multiple colon polyps. 3) Status post colon polypectomies by cold snare cold biopsy. 4) Status post mucosal resection of colon polyps. 5) Internal hemorrhoids. Recommendations: 1) Repeat colonoscopy 1 year. 2) Continue the omeprazole OTC 20 mg daily which is controlling this patient's reflux symptoms. 3) No source of this patient's anemia was identified on the EGD or colonoscopy. Pathology: Glandular adenomatous polyps. No malignancy.     PREVIOUS INTERVENTIONS:   1. B12, 1000 mcg im daily x3 then weekly x4 beginning 10/09/2017 through 11/07/2017.  2. Procrit beginning 10/09/2017 through 10/30/2017.        Problem List Items Addressed This Visit        Immune and Lymphatic    MGUS (monoclonal gammopathy of unknown significance)       Genitourinary    Stage 3 chronic kidney disease (CMS/HCC)    Anemia in stage 3 chronic kidney disease (CMS/HCC) - Primary         No history exists.       PAST MEDICAL HISTORY:  ALLERGIES:  No Known Allergies  CURRENT MEDICATIONS:  Outpatient Encounter Medications as of 12/17/2019   Medication Sig Dispense Refill   • allopurinol (ZYLOPRIM) 300 MG tablet Take 300 mg by mouth Daily.  1   • amLODIPine (NORVASC) 5 MG tablet Take 5 mg by mouth every night at bedtime.  1   • aspirin 81 MG chewable tablet Chew 81 mg Daily.     • atorvastatin (LIPITOR) 40 MG tablet Take 40 mg by mouth Every Night.     • bumetanide (BUMEX) 1 MG tablet Take 1 mg by mouth 2 (Two) Times a Day.     • coenzyme Q10 100 MG capsule Take 200 mg by mouth Daily.     • CONTOUR NEXT TEST test strip 1 each by Other route 4 (Four) Times a Day. use 1 strip to check glucose 4 times daily  3   • doxazosin (CARDURA) 8 MG tablet Take 8 mg by mouth Every Night.     • gabapentin (NEURONTIN) 300 MG capsule Take 300 mg by mouth Daily.     • hydrALAZINE (APRESOLINE) 100 MG tablet      •  isosorbide mononitrate (IMDUR) 30 MG 24 hr tablet Take 30 mg by mouth Daily.     • metoprolol tartrate (LOPRESSOR) 25 MG tablet Take 25 mg by mouth.     • omeprazole (priLOSEC) 20 MG capsule Take 20 mg by mouth Daily.     • spironolactone (ALDACTONE) 25 MG tablet Take 25 mg by mouth 2 (Two) Times a Day.     • TOUJEO MAX SOLOSTAR 300 UNIT/ML solution pen-injector injection      • ferrous sulfate 325 (65 FE) MG tablet Take 65 mg by mouth Daily With Breakfast.     • MICROLET LANCETS misc USE 1 TO CHECK GLUCOSE 4 TIMES DAILY  4   • [DISCONTINUED] allopurinol (ZYLOPRIM) 100 MG tablet Take 100 mg by mouth Daily.     • [DISCONTINUED] hydrALAZINE (APRESOLINE) 50 MG tablet Take 100 mg by mouth 3 (Three) Times a Day.     • [DISCONTINUED] magnesium gluconate (MAGONATE) 500 MG tablet Take 250 mg by mouth 2 (Two) Times a Day.       Facility-Administered Encounter Medications as of 12/17/2019   Medication Dose Route Frequency Provider Last Rate Last Dose   • epoetin marium (EPOGEN,PROCRIT) injection 40,000 Units  40,000 Units Subcutaneous Q28 Days Phu White MD         ADULT ILLNESSES:   Anemia ( ICD-10:D64.9 ;Anemia, unspecified   Monoclonal gammopathy ( ICD-10:D47.2 ;Monoclonal gammopathy   Acute tubular necrosis ( ICD-10:N17.0 ;Acute kidney failure with tubular necrosis   Anemia due to chronic kidney disease ( ICD-10:D63.1 ;Anemia in chronic kidney disease   Aortic valve disease ( with prior replacement; ICD-10:I35.9 ;Nonrheumatic aortic valve disorder, unspecified )   Chronic congestive heart failure ( history of; ICD-10:I50.9 ;Heart failure, unspecified )   Chronic kidney disease ( ICD-10:N18.4 ;Chronic kidney disease, stage 4 (severe)   Colon polyp ( ICD-10:K63.5 ;Polyp of colon   Coronary artery disease ( ICD-10:I25.10 ;Atherosclerotic heart disease of native coronary artery without angina pectoris   Diabetes mellitus ( ICD-10:E11.9 ;Type 2 diabetes mellitus without complications   Edema ( ICD-10:R60.0 ;Localized  "edema   Fatigue ( ICD-10:R53.82 ;Chronic fatigue, unspecified   Hyperlipidemia ( ICD-10:E78.5 ;Hyperlipidemia, unspecified   Hypertension ( ICD-10:I10 ;Essential (primary) hypertension   Iron deficiency anemia (disorder) ( ICD-10:D50.9 ;Iron deficiency anemia, unspecified   Leukopenia ( ICD-10:D72.819 ;Decreased white blood cell count, unspecified   Obesity ( ICD-10:E66.9 ;Obesity, unspecified   Proteinuria ( ICD-10:R80.9 ;Proteinuria, unspecified    SURGERIES:   Colonoscopic polypectomy: Repeat colonoscopy, 03/04/2019. \"3 polyps.\" Repeat 3 years   Coronary artery bypass grafting, (CABG)   Shoulder repair, left rotator cuff, 2015   Colonoscopic polypectomy: Colonoscopy, 03/27/2018 (Dr. Hahn). Impressions: 1) Diverticulosis. 2) Multiple colon polyps. 3) Status post colon polypectomies by cold snare cold biopsy. 4) Status post mucosal resection of colon polyps. 5) Internal hemorrhoids. Recommendations: 1) Repeat colonoscopy 1 year. 2) Continue the omeprazole OTC 20 mg daily which is controlling this patient's reflux symptoms. 3) No source of this patient's anemia was identified on the EGD or colonoscopy. Pathology: Glandular adenomatous polyps. No malignancy   Replacement of aortic valve, 2015   EGD, 03/27/2018 (Dr. Hahn): Impressions: 1) Hiatal hernia. 2) Distal esophageal stricture. 3) Status post esophageal dilation using Savary dilators over an endoscopically placed guidewire. Path: H pylori negative   Cardiac catheterization with stent placement      ADULT ILLNESSES:  Patient Active Problem List   Diagnosis Code   • Stage 3 chronic kidney disease (CMS/HCC) N18.3   • Anemia in stage 3 chronic kidney disease (CMS/HCC) N18.3, D63.1   • MGUS (monoclonal gammopathy of unknown significance) D47.2   • Aortic stenosis I35.0   • CAD (coronary artery disease) I25.10   • Chronic diastolic CHF (congestive heart failure) (CMS/[facility]) I50.32   • Diabetes mellitus (CMS/[facility]) E11.9   • Family history of early CAD Z82.49   • " "History of coronary artery stent placement Z95.5   • Hyperlipemia E78.5   • Hypertension I10   • Jaw pain R68.84   • Obesity E66.9   • 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   • BARBER (dyspnea on exertion) R06.09   • SOB (shortness of breath) R06.02   • Valvular heart disease I38     SURGERIES:  Past Surgical History:   Procedure Laterality Date   • CARDIAC SURGERY       HEALTH MAINTENANCE ITEMS:  Health Maintenance Due   Topic Date Due   • TDAP/TD VACCINES (1 - Tdap) 12/29/1956   • ZOSTER VACCINE (1 of 2) 12/29/1995   • PNEUMOCOCCAL VACCINE (65+ HIGH RISK) (1 of 2 - PCV13) 12/29/2010   • INFLUENZA VACCINE  08/01/2019   • HEPATITIS C SCREENING  09/03/2019   • MEDICARE ANNUAL WELLNESS  09/03/2019   • COLONOSCOPY  09/03/2019       <no information>  Last Completed Colonoscopy       Status Date      COLONOSCOPY No completions recorded          There is no immunization history on file for this patient.  Last Completed Mammogram     Patient has no health maintenance due at this time            FAMILY HISTORY:  History reviewed. No pertinent family history.  SOCIAL HISTORY:  Social History     Socioeconomic History   • Marital status:      Spouse name: Not on file   • Number of children: Not on file   • Years of education: Not on file   • Highest education level: Not on file   Tobacco Use   • Smoking status: Former Smoker   • Smokeless tobacco: Never Used       REVIEW OF SYSTEMS:  Constitutional:   The patient's appetite is good \"too excellent,\" but energy is only fair. He manages his ADLs including the chores, running errands, and driving. His activities are impeded by lower back and hip pains. Says he was previously seen by ortho and has had scans showing \"bulging disks\". Injection therapy, most recently sometime 09/2018, \"They never did any good.\" Says surgery previously discussed. Dr. Cornell previously followed. \"I'm ready to talk about it.\" He has lost 2 pounds (no weight changes at his prior visits) " "since his last visit. He has no fevers, chills, or drenching night sweats. His sleep habits seem appropriate.  Ear/Nose/Throat/Mouth:   He is hard of hearing. He reports no ear pains or sinus symptoms. He has no sore throat, nosebleeds, or sore tongue. He has no headaches. He denies any hoarseness, change in voice quality, or hemoptysis.   Ocular:   He reports no eye pain, significant change in visual acuity, double vision, or blurry vision.  Respiratory:   He reports baseline exertional dyspnea and is sometimes short of breath with his routine activities. He has no chronic cough, significant shortness of breathing at rest, phlegm production, or unexplained chest wall pain.  Cardiovascular:   He reports no exertional chest pain, chest pressure, or chest heaviness. He reports no claudication. He reports no palpitations or symptomatic orthostasis.  Gastrointestinal:   He reports no dysphagia, nausea, vomiting, postprandial abdominal pain, bloating, cramping, change in bowel habits, or discoloration of the stool. He reports no rectal bleeding. He reports no constipation or diarrhea. Says stools for fecal occult blood (FOB) negative per Dr. Davis. Last EGD and colonoscopy by Dr. Hahn, 03/27/2018 (above). Had repeat last 03/04/2019. \"3 polyps.\" Repeat 3 years.  Genitourinary:   He reports no urinary burning, frequency, dribbling, or discoloration. He reports no difficulty controlling his bladder. He has no need to urinate frequently through the night.   Musculoskeletal:   He reports chronic arthralgias, especially the hips and lower back. He has no myalgias or nighttime leg cramping. Says he no longer gets pain shots to his back by Dr. Cornell, most recently sometime 09/2018. \"never helped.\" Says surgery being discussed. He says he is ready to talk about it again.  Is seeing Dr. Swift of Neurology after MRIs.  Extremities:   He reports trouble with fluid retention and leg swelling.  Endocrine:   He reports no problems " "with excess thirst, excessive urination, vasomotor instability, or unexplained fatigue.  Heme/Lymphatic:   He reports no unexplained bleeding, bruising, petechial rashes, or swollen glands.  Skin:   He reports no itching, rashes, or lesions which won't heal.  Neuro:   He reports bouts of postural dizziness but no loss of consciousness, seizures, fainting spells. He reports no weakness of face, arms, or legs but has had right hip/buttock radiculitis. He has no difficulty with speech. He has no tremors.  Psych:   He seems generally satisfied with life. He denies depression. He reports no mood swings.        VITAL SIGNS: /70   Pulse (!) 48   Temp 97 °F (36.1 °C)   Resp 16   Ht 177.8 cm (70\")   Wt 132 kg (292 lb)   SpO2 94%   BMI 41.90 kg/m² Body surface area is 2.45 meters squared.  Pain Score    12/17/19 0939   PainSc: 0-No pain         PHYSICAL EXAMINATION:   General:   He is a very-pleasant, increasingly more obese, and modestly-kept elderly male who is comfortable at rest. He arrived in the exam room ambulatory. He appears to be his stated age. His skin color is normal.   Head/Neck:   The patient is anicteric and atraumatic. The oropharynx, mouth, and throat are clear. The trachea is midline. The neck is supple without evidence of jugular venous distention or cervical adenopathy. The mobile epidermoid (previously less than 1 cm) at the base of his neck on the right has flattened after it auto drained last 02/2019.  Eyes:   The pupils are equal, round, and reactive to light. The extraocular movements are full. There is no scleral jaundice or erythema.   Chest:   The respiratory efforts are normal and unhindered. The chest is clear to auscultation and percussion. There are no wheezes, rhonchi, rales, or asymmetry of breath sounds.  Cardiovascular:   The patient has a regular cardiac rate and rhythm with an aortic murmur at the bases but heard throughout. No rubs or gallops. The peripheral pulses are " equal and full.  Abdomen:   The belly is soft and morbidly obese with a large panniculus precluding an adequate exam. There is no rebound or guarding. There is no organomegaly, mass-effect, or tenderness. Bowel sounds are active and of normal character.  Extremities:   There is no evidence of cyanosis, clubbing, with 1+ (same) ankle edema.  Rheumatologic:   There is no overt evidence of rheumatoid deformities of the hands. There is no sausaging of the fingers. There is no sign of active synovitis. The gait is slow but independent.  Cutaneous:   There are no overt rashes, disseminated lesions, purpura, or petechiae.   Lymphatics:   There is no evidence of adenopathy in the cervical, supraclavicular, axillary, inguinal, or femoral areas.   Neurologic:   The patient is alert, oriented, cooperative, and pleasant. He is appropriately conversant. He ambulated into the exam room without assistance and transferred from chair to exam table unaided. There is no overt dysfunction of the motor, sensory, cerebellar systems.  Psych:   Mood and affect are appropriate for circumstance. Eye contact is appropriate. Normal judgement and decision making.         LABS    Lab Results - Last 18 Months   Lab Units 12/09/19  0850 10/28/19  0903 10/01/19  0850 09/03/19  0856 02/26/19  1700   HEMOGLOBIN g/dL 12.8* 12.7* 13.0 13.0 12.1*   HEMATOCRIT % 39.6 39.5 41.2 40.1 38.5*   MCV fL 94.5 94.3 94.5 92.0 97.2*   WBC 10*3/mm3 5.36 5.44 5.70 5.94 6.76   RDW % 13.1 13.1 13.0 12.7 13.6   MPV fL 9.6 9.9 9.8 9.8 11.1   PLATELETS 10*3/mm3 189 175 198 201 211   IMM GRAN % % 0.4 0.0 0.2 0.5 0.4   NEUTROS ABS 10*3/mm3 3.67 3.45 3.81 3.99 4.92   LYMPHS ABS 10*3/mm3 1.02 1.23 1.25 1.17 1.05   MONOS ABS 10*3/mm3 0.42 0.54 0.47 0.52 0.52   EOS ABS 10*3/mm3 0.20 0.18 0.14 0.20 0.20   BASOS ABS 10*3/mm3 0.03 0.04 0.02 0.03 0.04   IMMATURE GRANS (ABS) 10*3/mm3 0.02 0.00 0.01 0.03 0.03   NRBC /100 WBC  --   --   --   --  0.0       Lab Results - Last 18 Months    Lab Units 12/09/19  0850 08/06/19  1730 07/02/19  1600 05/14/19  1700  03/12/19  1600   GLUCOSE mg/dL 202* 496* 328* 268*  --  191*   SODIUM mmol/L 142 133* 137 138  --  139   POTASSIUM mmol/L 3.8 4.6 4.5 4.7  --  4.4   CO2 mmol/L 28.0 25.0 27.0 28.0  --  27.0   CHLORIDE mmol/L 99 95* 97* 99  --  98   ANION GAP mmol/L 15.0 13.0 13.0 11.0  --  14.0*   CREATININE mg/dL 2.02* 1.89* 1.82* 1.79*  --  2.03*   BUN mg/dL 39* 38* 32* 33*  --  44*   BUN / CREAT RATIO  19.3 20.1 17.6 18.4  --  21.7   CALCIUM mg/dL 9.4 9.6 9.4 9.5  --  9.5   EGFR IF NONAFRICN AM mL/min/1.73 33* 35* 37* 37*  --  32*   ALK PHOS U/L 111 165* 131* 99  --  102   TOTAL PROTEIN g/dL 7.2 6.8 6.7 6.3  --  6.4   ALT (SGPT) U/L 28 60* 51 47  --  54   AST (SGOT) U/L 18 31 28 29  --  29   BILIRUBIN mg/dL 0.3 0.5 0.6 0.5  --  0.6   ALBUMIN g/dL 4.50  3.7 4.30 4.50  3.6 4.10   < > 4.20  4.0   GLOBULIN gm/dL 2.7 2.5 2.2 2.2  --  2.2    < > = values in this interval not displayed.       Lab Results - Last 18 Months   Lab Units 12/09/19  0850 07/02/19  1600 04/16/19  1500 03/19/19  1700 03/12/19  1600   M-SPIKE g/dL 0.2* 0.3* 0.2* 0.2* 0.2*   KAPPA/LAMBDA RATIO, S  2.02* 2.05* 2.18*  --  2.16*   FREE LAMBDA LIGHT CHAINS mg/L 17.2 18.0 15.7  --  13.5   IG KAPPA FREE LIGHT CHAIN mg/L 34.7* 36.9* 34.3*  --  29.2*   REFERENCE LAB REPORT   --   --   --   --  See attached report       Lab Results - Last 18 Months   Lab Units 12/09/19  0850 08/06/19  1730 07/02/19  1600 05/14/19  1700 03/12/19  1600   IRON mcg/dL 61 79 73 91 83   TIBC mcg/dL 365 323 302 301 301   IRON SATURATION % 17* 24 24 30 28   FERRITIN ng/mL 445.60* 373.00 343.00 356.00 393.00   FOLATE ng/mL 5.47  --  8.11  --  7.37       ASSESSMENT:   1. Normocytic anemia. Contribution from chronic kidney disease. Hgb, 12.8; MCV 94.5, 12/09/2019 (prior: Hgb 8.6 - 13; MCV 84 - 97.2).   2. Chronic kidney disease, Stage III-IV. Stable, GFR 33 mL/min on 12/09/2019 (prior range: 24.8 - 37 mL/min). Followed by   "Bret   3. IgG lambda monoclonal gammopathy of undetermined significance (MGUS):   a. No associated hypercalcemia.   b. Monoclonal IgG less than 3 gm - pending, 12/09/2019 (prior range: 503 - 664)/ M-spike 0.2 (prior: 0.2 - 0.2).   c. Stable renal dysfunction.   d. Equivocal right humerus lytic bone lesions.   e. Negative UPIEP for Bence-Tyler proteins.   f. Marrow biopsy, 12/01/2017 without monoclonal plasma cells.   g. Stable kappa/lambda ratio, 2.02 on 12/09/2019 (prior range: 2.05 - 2.34).  4. Proteinuria, NOS. Prior urine protein immunoelectrophoresis (UPIEP) with no monoclonality.   5. History of coronary artery disease.   6. Chronic congestive heart failure (CHF).   7. Aortic valve replacement.   8. Coronary artery bypass grafting (CABG).   9. Heart catheterization with stent placement.   10. Diabetes mellitus.   11. Obesity.   12. Chronic fatigue, contributions from all the above.   13. 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 Office encounter from ortho - Dr. Newman, 01/29/2018. Patient left Dr. Newman's office without being seen. No explanation given.   14. Bone survey with sclerotic lesion of the left humerus, however, CT upper right extremity, 01/24/2018: No worrisome bony lesions are seen in the RIGHT humerus. The cortical defect in the proximal humerus is consistent with a previous biceps tenodesis. Moderate degenerative changes in the acromioclavicular and glenohumeral joints. This is in comparison to bone survey 11/22/2017.   15. Multiple colon polyps. Repeat colonoscopy, 03/04/2019. \"3 polyps.\" Repeat 3 years.    RECOMMENDATIONS:   1.  Draw IgG. IgA and IgM if not done  2.   Re: Apprise of labs from 12/09/2019 with stable anemia and otherwise normal CBC, low (stable) GFR, hyperglycemia, mild alk phos elevation and otherwise normal CMP, repleted serum iron, slightly low Fe sat (less than 20%), repleted ferritin (greater than 100), SPIEP stable - previously stable IgG " lambda monoclonal immunoglobulin (550; from 579; from 664; from 595). M-spike 0.2 (from 0.3). Stable serum light chain kappa/lambda ratio.  Repleted B12 and folate.  3.  Previously discussed the EGD and colonoscopy findings from 03/27/2018 (above) are noted.   4. Rx:  ferrous sulfate 325 po daily # 30 x 3 RF-eRx.   5. Previously discussed labs from 11/14/2017 and 11/16/2017 with negative UPIEP, but slightly elevated kappa/lambda light chain ratio. Discussed the bone marrow biopsy from 12/01/2017. Normocellular with maturing trilineage hematopoiesis. No atypical lymphoid or plasmacytoid aggregates. No evidence of metastatic malignancy or granulomatous disease. No increased blasts. Decreased stainable iron.   6.  MGUS again discussed. I had noted that MGUS occurs in 1-3% of the general population. Four groups of patients with MGUS can be identified: 1) Those still alive with monoclonal gammopathy (19%); 2) Those whose serum immunoglobulin level increased to more than 3g/dL but who do not require any therapy (10%); 3) Patients who die without developing multiple myeloma or other related disease (47%); and 4) Patients who develop myeloma amyloidosis macroglobulinemia or another lymphoproliferative disorder (24%, 2/3 of these disorders from myeloma), which occurred at the median of 8 - 10 years.   7.  Previously discussed the rationale and potential toxicities (including the risk for increased mortality from stroke, myocardial infarction (MI), congestive heart failure (CHF), seizures, sepsis, allergic reactions) for HAROON support. Questions answered.   8.  CBC every 4 weeks with Procrit 40,000 units subcutaneously if Hgb less 11 and less than 33 - BHP.   9.  Continue other currently identified medications.   10. Continue ongoing management per primary care physician and other specialists.   11. Advance Directive discussed.   12. Return to the [place] office in 24 weeks with pre-office SPIEP, kappa/lambda ratio, CMP, serum  iron, Fe sat, ferritin, B12, folate, CBC and differential.     MEDICAL DECISION MAKING: Moderate Complexity   AMOUNT OF DATA: Moderate     TIME SPENT: Face-to-face time on this encounter, as defined by the American Medical Association in the 2019 Current Procedural Terminology codebook; assessment, record review, lab review, planning and education - 27 minutes (greater than 50% face-to-face).     cc: MD Stuart Marte MD-MD Dylon Lerner MD

## 2019-12-16 ENCOUNTER — HOSPITAL ENCOUNTER (OUTPATIENT)
Dept: GENERAL RADIOLOGY | Age: 74
Discharge: HOME | End: 2019-12-16
Payer: MEDICARE

## 2019-12-16 ENCOUNTER — OFFICE VISIT (OUTPATIENT)
Dept: NEUROSURGERY | Age: 74
End: 2019-12-16
Payer: MEDICARE

## 2019-12-16 VITALS
OXYGEN SATURATION: 95 % | SYSTOLIC BLOOD PRESSURE: 100 MMHG | BODY MASS INDEX: 41.52 KG/M2 | HEIGHT: 70 IN | HEART RATE: 78 BPM | DIASTOLIC BLOOD PRESSURE: 59 MMHG | WEIGHT: 290 LBS

## 2019-12-16 DIAGNOSIS — G89.29 CHRONIC MIDLINE LOW BACK PAIN WITH RIGHT-SIDED SCIATICA: Primary | ICD-10-CM

## 2019-12-16 DIAGNOSIS — M54.41 CHRONIC MIDLINE LOW BACK PAIN WITH RIGHT-SIDED SCIATICA: Primary | ICD-10-CM

## 2019-12-16 DIAGNOSIS — G89.29 CHRONIC MIDLINE LOW BACK PAIN WITH RIGHT-SIDED SCIATICA: ICD-10-CM

## 2019-12-16 DIAGNOSIS — R26.89 ANTALGIC GAIT: ICD-10-CM

## 2019-12-16 DIAGNOSIS — Z87.898 H/O PAIN WHEN WALKING: ICD-10-CM

## 2019-12-16 DIAGNOSIS — M25.551 RIGHT HIP PAIN: ICD-10-CM

## 2019-12-16 DIAGNOSIS — M79.604 RIGHT LEG PAIN: ICD-10-CM

## 2019-12-16 DIAGNOSIS — M48.061 LUMBAR FORAMINAL STENOSIS: ICD-10-CM

## 2019-12-16 DIAGNOSIS — M54.41 CHRONIC MIDLINE LOW BACK PAIN WITH RIGHT-SIDED SCIATICA: ICD-10-CM

## 2019-12-16 DIAGNOSIS — R20.0 NUMBNESS OF RIGHT HAND: ICD-10-CM

## 2019-12-16 PROCEDURE — 99204 OFFICE O/P NEW MOD 45 MIN: CPT | Performed by: NURSE PRACTITIONER

## 2019-12-16 PROCEDURE — 72110 X-RAY EXAM L-2 SPINE 4/>VWS: CPT

## 2019-12-16 PROCEDURE — G8427 DOCREV CUR MEDS BY ELIG CLIN: HCPCS | Performed by: NURSE PRACTITIONER

## 2019-12-16 PROCEDURE — 4040F PNEUMOC VAC/ADMIN/RCVD: CPT | Performed by: NURSE PRACTITIONER

## 2019-12-16 PROCEDURE — G8484 FLU IMMUNIZE NO ADMIN: HCPCS | Performed by: NURSE PRACTITIONER

## 2019-12-16 PROCEDURE — G8417 CALC BMI ABV UP PARAM F/U: HCPCS | Performed by: NURSE PRACTITIONER

## 2019-12-16 PROCEDURE — G8598 ASA/ANTIPLAT THER USED: HCPCS | Performed by: NURSE PRACTITIONER

## 2019-12-16 PROCEDURE — 1036F TOBACCO NON-USER: CPT | Performed by: NURSE PRACTITIONER

## 2019-12-16 PROCEDURE — 1123F ACP DISCUSS/DSCN MKR DOCD: CPT | Performed by: NURSE PRACTITIONER

## 2019-12-16 PROCEDURE — 3017F COLORECTAL CA SCREEN DOC REV: CPT | Performed by: NURSE PRACTITIONER

## 2019-12-16 RX ORDER — MAGNESIUM GLUCONATE 27 MG(500)
250 TABLET ORAL
Status: ON HOLD | COMMUNITY
End: 2022-03-13

## 2019-12-16 RX ORDER — PERPHENAZINE 16 MG/1
TABLET, FILM COATED ORAL
Refills: 3 | COMMUNITY
Start: 2019-11-23 | End: 2022-03-12

## 2019-12-16 RX ORDER — ACYCLOVIR 800 MG/1
TABLET ORAL
COMMUNITY
End: 2022-03-12

## 2019-12-16 RX ORDER — LANCETS
EACH MISCELLANEOUS
Refills: 4 | Status: ON HOLD | COMMUNITY
Start: 2019-11-23 | End: 2022-03-13

## 2019-12-16 ASSESSMENT — ENCOUNTER SYMPTOMS
GASTROINTESTINAL NEGATIVE: 1
RESPIRATORY NEGATIVE: 1
EYES NEGATIVE: 1
BACK PAIN: 1

## 2019-12-17 ENCOUNTER — OFFICE VISIT (OUTPATIENT)
Dept: ONCOLOGY | Facility: CLINIC | Age: 74
End: 2019-12-17

## 2019-12-17 ENCOUNTER — LAB (OUTPATIENT)
Dept: ONCOLOGY | Facility: CLINIC | Age: 74
End: 2019-12-17

## 2019-12-17 VITALS
WEIGHT: 292 LBS | RESPIRATION RATE: 16 BRPM | SYSTOLIC BLOOD PRESSURE: 140 MMHG | OXYGEN SATURATION: 94 % | BODY MASS INDEX: 41.8 KG/M2 | HEART RATE: 48 BPM | TEMPERATURE: 97 F | HEIGHT: 70 IN | DIASTOLIC BLOOD PRESSURE: 70 MMHG

## 2019-12-17 DIAGNOSIS — N18.30 STAGE 3 CHRONIC KIDNEY DISEASE (HCC): ICD-10-CM

## 2019-12-17 DIAGNOSIS — N18.30 ANEMIA IN STAGE 3 CHRONIC KIDNEY DISEASE (HCC): Primary | ICD-10-CM

## 2019-12-17 DIAGNOSIS — D47.2 MGUS (MONOCLONAL GAMMOPATHY OF UNKNOWN SIGNIFICANCE): ICD-10-CM

## 2019-12-17 DIAGNOSIS — D63.1 ANEMIA IN STAGE 3 CHRONIC KIDNEY DISEASE (HCC): ICD-10-CM

## 2019-12-17 DIAGNOSIS — N18.30 ANEMIA IN STAGE 3 CHRONIC KIDNEY DISEASE (HCC): ICD-10-CM

## 2019-12-17 DIAGNOSIS — D63.1 ANEMIA IN STAGE 3 CHRONIC KIDNEY DISEASE (HCC): Primary | ICD-10-CM

## 2019-12-17 PROBLEM — I35.0 AORTIC STENOSIS: Status: ACTIVE | Noted: 2019-12-17

## 2019-12-17 PROBLEM — E11.9 DIABETES MELLITUS (HCC): Status: ACTIVE | Noted: 2019-12-17

## 2019-12-17 PROBLEM — I50.32 CHRONIC DIASTOLIC CHF (CONGESTIVE HEART FAILURE) (HCC): Status: ACTIVE | Noted: 2019-03-26

## 2019-12-17 PROBLEM — I25.10 CAD (CORONARY ARTERY DISEASE): Status: ACTIVE | Noted: 2019-12-17

## 2019-12-17 PROBLEM — R06.09 DOE (DYSPNEA ON EXERTION): Status: ACTIVE | Noted: 2017-07-24

## 2019-12-17 PROBLEM — I38 VALVULAR HEART DISEASE: Status: ACTIVE | Noted: 2019-03-26

## 2019-12-17 PROBLEM — E78.5 HYPERLIPEMIA: Status: ACTIVE | Noted: 2019-12-17

## 2019-12-17 PROCEDURE — 99214 OFFICE O/P EST MOD 30 MIN: CPT | Performed by: INTERNAL MEDICINE

## 2019-12-17 PROCEDURE — 82784 ASSAY IGA/IGD/IGG/IGM EACH: CPT | Performed by: INTERNAL MEDICINE

## 2019-12-17 PROCEDURE — 36415 COLL VENOUS BLD VENIPUNCTURE: CPT | Performed by: INTERNAL MEDICINE

## 2019-12-17 RX ORDER — FERROUS SULFATE 325(65) MG
325 TABLET ORAL
Qty: 30 TABLET | Refills: 3 | Status: SHIPPED | OUTPATIENT
Start: 2019-12-17 | End: 2019-12-17 | Stop reason: SDUPTHER

## 2019-12-17 RX ORDER — HYDRALAZINE HYDROCHLORIDE 100 MG/1
TABLET, FILM COATED ORAL
COMMUNITY
Start: 2019-12-16

## 2019-12-17 RX ORDER — INSULIN GLARGINE 300 U/ML
INJECTION, SOLUTION SUBCUTANEOUS
COMMUNITY
Start: 2019-12-15 | End: 2020-06-09 | Stop reason: ALTCHOICE

## 2019-12-17 RX ORDER — FERROUS SULFATE 325(65) MG
325 TABLET ORAL
Qty: 30 TABLET | Refills: 3 | Status: SHIPPED | OUTPATIENT
Start: 2019-12-17 | End: 2020-06-09 | Stop reason: ALTCHOICE

## 2019-12-17 RX ORDER — ALLOPURINOL 300 MG/1
300 TABLET ORAL DAILY
Refills: 1 | COMMUNITY
Start: 2019-11-23

## 2019-12-17 RX ORDER — AMLODIPINE BESYLATE 5 MG/1
5 TABLET ORAL
Refills: 1 | COMMUNITY
Start: 2019-12-10

## 2019-12-17 RX ORDER — PERPHENAZINE 16 MG/1
1 TABLET, FILM COATED ORAL 4 TIMES DAILY
Refills: 3 | COMMUNITY
Start: 2019-11-23

## 2019-12-17 RX ORDER — LANCETS
EACH MISCELLANEOUS
Refills: 4 | COMMUNITY
Start: 2019-11-23

## 2019-12-18 LAB
IGA1 MFR SER: 156 MG/DL (ref 70–400)
IGG1 SER-MCNC: 626 MG/DL (ref 700–1600)
IGM SERPL-MCNC: 37 MG/DL (ref 40–230)

## 2019-12-19 ENCOUNTER — HOSPITAL ENCOUNTER (OUTPATIENT)
Dept: MRI IMAGING | Age: 74
Discharge: HOME | End: 2019-12-19
Payer: MEDICARE

## 2019-12-19 DIAGNOSIS — R20.0 NUMBNESS OF RIGHT HAND: ICD-10-CM

## 2019-12-19 DIAGNOSIS — Z87.898 H/O PAIN WHEN WALKING: ICD-10-CM

## 2019-12-19 DIAGNOSIS — G89.29 CHRONIC MIDLINE LOW BACK PAIN WITH RIGHT-SIDED SCIATICA: ICD-10-CM

## 2019-12-19 DIAGNOSIS — M25.551 RIGHT HIP PAIN: ICD-10-CM

## 2019-12-19 DIAGNOSIS — R26.89 ANTALGIC GAIT: ICD-10-CM

## 2019-12-19 DIAGNOSIS — M54.41 CHRONIC MIDLINE LOW BACK PAIN WITH RIGHT-SIDED SCIATICA: ICD-10-CM

## 2019-12-19 DIAGNOSIS — M48.061 LUMBAR FORAMINAL STENOSIS: ICD-10-CM

## 2019-12-19 DIAGNOSIS — M79.604 RIGHT LEG PAIN: ICD-10-CM

## 2019-12-27 ENCOUNTER — HOSPITAL ENCOUNTER (OUTPATIENT)
Dept: MRI IMAGING | Age: 74
Discharge: HOME | End: 2019-12-27
Payer: MEDICARE

## 2019-12-27 DIAGNOSIS — R20.0 NUMBNESS OF RIGHT HAND: ICD-10-CM

## 2019-12-27 DIAGNOSIS — M25.551 RIGHT HIP PAIN: ICD-10-CM

## 2019-12-27 DIAGNOSIS — M48.061 LUMBAR FORAMINAL STENOSIS: ICD-10-CM

## 2019-12-27 DIAGNOSIS — M79.604 RIGHT LEG PAIN: ICD-10-CM

## 2019-12-27 DIAGNOSIS — G89.29 CHRONIC MIDLINE LOW BACK PAIN WITH RIGHT-SIDED SCIATICA: ICD-10-CM

## 2019-12-27 DIAGNOSIS — R26.89 ANTALGIC GAIT: ICD-10-CM

## 2019-12-27 DIAGNOSIS — M54.41 CHRONIC MIDLINE LOW BACK PAIN WITH RIGHT-SIDED SCIATICA: ICD-10-CM

## 2019-12-27 DIAGNOSIS — Z87.898 H/O PAIN WHEN WALKING: ICD-10-CM

## 2019-12-27 PROCEDURE — 72148 MRI LUMBAR SPINE W/O DYE: CPT

## 2020-01-08 ENCOUNTER — OFFICE VISIT (OUTPATIENT)
Dept: NEUROSURGERY | Age: 75
End: 2020-01-08
Payer: MEDICARE

## 2020-01-08 ENCOUNTER — TELEPHONE (OUTPATIENT)
Dept: NEUROSURGERY | Age: 75
End: 2020-01-08

## 2020-01-08 VITALS
SYSTOLIC BLOOD PRESSURE: 106 MMHG | BODY MASS INDEX: 41.52 KG/M2 | DIASTOLIC BLOOD PRESSURE: 67 MMHG | HEIGHT: 70 IN | HEART RATE: 56 BPM | WEIGHT: 290 LBS | OXYGEN SATURATION: 95 %

## 2020-01-08 PROCEDURE — 99214 OFFICE O/P EST MOD 30 MIN: CPT | Performed by: NURSE PRACTITIONER

## 2020-01-08 RX ORDER — FERROUS SULFATE 325(65) MG
TABLET ORAL
Status: ON HOLD | COMMUNITY
Start: 2019-12-18 | End: 2022-03-13

## 2020-01-08 RX ORDER — INSULIN GLARGINE 300 U/ML
INJECTION, SOLUTION SUBCUTANEOUS
COMMUNITY
Start: 2020-01-06 | End: 2022-03-12

## 2020-01-08 ASSESSMENT — ENCOUNTER SYMPTOMS
RESPIRATORY NEGATIVE: 1
BACK PAIN: 1
EYES NEGATIVE: 1
GASTROINTESTINAL NEGATIVE: 1

## 2020-01-08 NOTE — PROGRESS NOTES
10/05/2016     Lab Results   Component Value Date    INR 1.03 08/03/2015    INR 1.11 07/21/2015    INR 0.93 07/21/2015    PROTIME 13.2 08/03/2015    PROTIME 14.0 07/21/2015    PROTIME 12.2 07/21/2015       No recent images to view at today's visit. MRI Lumbar Spine 2018 Reviewed:  Narrative   MRI LUMBAR SPINE WO CONTRAST 3/23/2018 3:07 PM   HISTORY: M54.16   COMPARISON: None    TECHNIQUE: Multiplanar, multisequence MRI of the lumbar spine was   performed without the use of contrast.   FINDINGS:    Alignment: There are presumed to be 5 lumbar-type vertebrae, with the   most inferior being labeled as L5. Normal lumbar lordosis is   maintained. There is no evidence of listhesis or subluxation. Marrow signal: Type III degenerative endplate changes are seen at   L4-L5. Osteophytes are also noted at this level. Cord/Canal: The conus medullaris terminates at the level of L1. The   visualized spinal cord is normal in signal and morphology. Soft tissues: The surrounding soft tissues are unremarkable. Levels:    L1-L2: Mild disc space narrowing. No spinal canal or neuroforaminal   stenosis. L2-L3: Moderate facet hypertrophy. Small disc bulge. Mild   neuroforaminal stenosis. No spinal canal stenosis. L3-L4: Moderate facet and ligamentum flavum hypertrophy. Moderate   right and mild to moderate left neuroforaminal stenosis. No spinal   canal stenosis. L4-L5: Moderate, diffuse disc bulge. Mild left and moderate to marked   right facet hypertrophy. Moderate to marked right and moderate left   neuroforaminal stenosis. Mild spinal canal stenosis. L5-S1: Small, diffuse disc bulge. Mild facet hypertrophy. Moderate   left and mild right neuroforaminal stenosis. No spinal canal stenosis.       Impression   1. Degenerative disc disease and facet hypertrophy at multiple levels. These findings contribute to moderate to marked neuroforaminal   stenosis at L4-L5. Other levels are described above.    Signed by  Ivelisse Bentley on 3/23/2018 5:16 PM         Narrative   Exam: MRI LUMBAR SPINE WO CONTRAST - 12/27/2019 6:00 AM   Indication: Chronic low back pain with right-sided sciatica for 8   years, right hip and leg pain   Comparison: MR 3/23/2018   Findings: This report assumes 5 lumbar-type vertebral bodies. Lumbar lordosis   and alignment are maintained. Vertebral body heights are maintained. No acute fracture or subluxation. No suspicious vertebral body   lesions. The conus and cauda equina appear unremarkable. The   paravertebral soft tissues appear normal.   L1-L2: Disc bulge indents and mildly flattens the ventral thecal sac. Along with facet arthropathy, there is mild narrowing of the central   canal and neural foramina. L2-L3: Disc bulge indents and flattens the ventral thecal sac. Along   with facet arthropathy, there is mild/moderate central canal narrowing   and mild bilateral neural foraminal narrowing. L3-L4: Disc bulge indents the ventral thecal sac. Along with facet   arthropathy, there is mild-moderate central canal narrowing and   moderate bilateral neural foraminal narrowing. L4-L5: Disc bulge indents and flattens the ventral thecal sac. Along   with facet arthropathy, there is mild-moderate central canal narrowing   and moderate left and moderate-severe right neural foraminal   narrowing. Disc material abuts the undersurface of the exiting right   L4 nerve root. L5-S1: Central canal is widely patent. Facet arthropathy causes   moderate bilateral neural foraminal narrowing. Disc material closely   approximates and may abut the undersurface of both exiting L5 nerve   roots.       Impression   Multilevel degenerative change, described level by level above,   similar to 2018. Notably, there is moderate-severe right neural   foraminal narrowing at L4-L5 where disc/osteophyte material appears to   abut the exiting right L4 nerve root. Correlate with right L4   radiculopathy.    Signed by Dr Frank Zelaya Chong Cordero on 12/27/2019 8:08 AM   I have personally reviewed the images and my interpretation is:  DDD throughout  L4-5 moderate right and mild left foraminal stenosis  L5-S1 mild right and moderate left foraminal stenosis       Narrative   Examination. XR LUMBAR SPINE (MIN 4 VIEWS) 12/16/2019 10:18 AM   History: Chronic midline low back pain. The frontal and lateral views of the lumbar spine are obtained. There   is no previous similar study for comparison. The correlation made with   the previous MR imaging of the lumbar spine dated 3/23/2018. There are 5 nonrib-bearing lumbar vertebrae. The curve and alignment   are normal. The vertebral body heights are normal.   Chronic degenerative changes are seen in the form of osteophytes and   narrowing of the disc spaces most pronounced at L4-5. Posterior processes are intact. Atheromatous changes of the abdominal aorta and iliac arteries are   seen. The images of the lumbar spine are obtained in neutral flexion and   extension position. There is good translation of the lumbar spine. There is no change in alignment.       Impression   Lumbar spondylosis. No cord translation of the lumbar spine during flexion and extension. No instability. Signed by Dr Sherri Oshea on 12/16/2019 1:34 PM   I have personally reviewed the images and my interpretation is:  No instability noted       ASSESSMENT:    Minesh Burt is a 76 y.o. male with complaints of low back pain, right leg pain. ICD-10-CM    1. Lumbar foraminal stenosis M48.061 [address] [place], [place] [address]Avril MD, Pain Medicine, [place]   2. Chronic midline low back pain with right-sided sciatica M54.41 [address] [place], [place] [address]Avril MD, Pain Medicine, [place]    G89.29    3. Right hip pain M25.551 [facility], Eli Oscar, MD, Pain Medicine, [place]   4. Right leg pain M79.604 [address] [place], [place] [address]Avril MD, Pain Medicine, [place]   5.  H/O pain when walking Z87.898 [address] [place], [place] [address]Avril MD, Pain Medicine, [place]   6. Antalgic gait R26.89 [address], Radha Prather MD, Pain Medicine, [facility]   7. DDD (degenerative disc disease), lumbar M51.36 [address] [facility], Radha Prather MD, Pain Medicine, [place]   8. DDD (degenerative disc disease), lumbosacral M51.37 [address] [facility], Radha Prather MD, Pain Medicine, [place]       PLAN:  -I discussed and reviewed the results of the MRI/XR lumbar spine with . Nahed Fernandez and his spouse at length. I explained that he does have significant narrowing on the right at L4-5, he complains of more of a right L5. That being said, I would like for him to attempt injections once more through a different provider given that it has been so long. He verbalizes understanding.      -NCS BUE (pt to be called to reschedule)  -Refer to Dr. Jack Ya for DALI L4-5  -Follow up 6 weeks     Kat Martin, CHERYL

## 2020-01-08 NOTE — PROGRESS NOTES
Review of Systems   Constitutional: Negative. HENT: Negative. Eyes: Negative. Respiratory: Negative. Cardiovascular: Negative. Gastrointestinal: Negative. Genitourinary: Positive for frequency and urgency. Musculoskeletal: Positive for back pain, joint pain, myalgias and neck pain. Skin: Negative. Neurological: Negative. Endo/Heme/Allergies: Negative. Psychiatric/Behavioral: Negative.

## 2020-01-13 ENCOUNTER — TELEPHONE (OUTPATIENT)
Dept: NEUROSURGERY | Age: 75
End: 2020-01-13

## 2020-01-13 NOTE — TELEPHONE ENCOUNTER
Patient s spouse called asking for the date of when patient is supposed to get his nct done? Do you know when [place] so that I can inform patient?

## 2020-01-16 ENCOUNTER — HOSPITAL ENCOUNTER (OUTPATIENT)
Dept: PAIN MANAGEMENT | Age: 75
Discharge: HOME | End: 2020-01-16
Payer: MEDICARE

## 2020-01-16 ENCOUNTER — HOSPITAL ENCOUNTER (OUTPATIENT)
Dept: GENERAL RADIOLOGY | Age: 75
Discharge: HOME | End: 2020-01-16
Payer: MEDICARE

## 2020-01-16 PROBLEM — M54.41 CHRONIC BILATERAL LOW BACK PAIN WITH RIGHT-SIDED SCIATICA: Status: ACTIVE | Noted: 2020-01-16

## 2020-01-16 PROBLEM — G89.29 CHRONIC BILATERAL LOW BACK PAIN WITH RIGHT-SIDED SCIATICA: Status: ACTIVE | Noted: 2020-01-16

## 2020-01-16 PROCEDURE — 73521 X-RAY EXAM HIPS BI 2 VIEWS: CPT

## 2020-01-16 PROCEDURE — 99215 OFFICE O/P EST HI 40 MIN: CPT

## 2020-01-16 PROCEDURE — 99214 OFFICE O/P EST MOD 30 MIN: CPT | Performed by: NURSE PRACTITIONER

## 2020-01-16 ASSESSMENT — ENCOUNTER SYMPTOMS
BOWEL INCONTINENCE: 0
BACK PAIN: 1

## 2020-01-16 NOTE — H&P
[facility] Physical & Pain Medicine    History and Physical    Patient Name: Eben Sapp    MR #: [id number]    Account [de-identified]    : 1945    Age: 76 y.o. Sex: male    Date: 2020    PCP: Refugio Jack    Referring Provider: Dr. Trevino, neurosurgery    Chief Complaint:   Chief Complaint   Patient presents with    Lower Back Pain    Hip Pain     Right       History of Present Illness: The patient is a 76 y.o. male who presents with referral with primary complaints of low back pain and right hip pain. Patient states that pain is affecting his quality of life and his ability to do ADLs. Patient underwent physical therapy in 2017 and 2018 which did help at some point. Patient did have epidural injections in the past which did not help. Patient has been on gabapentin which has helped. Patient has had MRI of the lumbar spine along with x-rays of the lumbar spine. Patient has a nerve conduction study scheduled by neurosurgery. Back Pain   This is a recurrent problem. The current episode started more than 1 month ago. The problem occurs intermittently. The pain is present in the lumbar spine. Quality: sharp pain in right hip area. The pain radiates to the right thigh (posterior stops at the knee). The pain is worse during the night. The symptoms are aggravated by sitting (lying down at night and certain chairs). Associated symptoms include leg pain and numbness. Pertinent negatives include no bladder incontinence, bowel incontinence, paresthesias, tingling or weakness. He has tried analgesics (injections - RFLs) for the symptoms. The treatment provided mild relief.        Previous Injury: No    Screening Tools:     PEG Score: 5.7     Last PEG Score: N/A     Annual ORT Score: 0     Annual PHQ Score: 3    Current Pain Assessment       Past Medical History  Past Medical History:   Diagnosis Date    Aortic stenosis     moderate    CAD (coronary artery disease)     stent to Circ    Chest tightness, discomfort, or pressure 3/23/2012    Chronic kidney disease     Stage 4    Diabetes mellitus ([facility] [address]     Family history of early CAD 3/23/2012    GERD (gastroesophageal reflux disease)     Hyperlipemia     Hypertension 3/23/2012    Obesity     Unspecified sleep apnea        Medications  Current Outpatient Medications   Medication Sig Dispense Refill    FEROSUL 325 (65 Fe) MG tablet       TOUJEO MAX SOLOSTAR 300 UNIT/ML SOPN INJECT 40 UNITS SUBCUTANEOUSLY ONCE DAILY      magnesium gluconate (MAGONATE) 500 MG tablet Take 250 mg by mouth      CONTOUR NEXT TEST strip USE 1 STRIP TO CHECK GLUCOSE 4 TIMES DAILY  3    epoetin ceasar (EPOGEN;PROCRIT) 90201 UNIT/ML injection Inject 40,000 Units into the skin      acyclovir (ZOVIRAX) 800 MG tablet acyclovir 800 mg tablet   prn      MICROLET LANCETS MISC USE 1 TO CHECK GLUCOSE 4 TIMES DAILY  4    allopurinol (ZYLOPRIM) 300 MG tablet Take 300 mg by mouth daily      spironolactone (ALDACTONE) 25 MG tablet Take 25 mg by mouth 2 times daily      amLODIPine (NORVASC) 5 MG tablet Take 5 mg by mouth daily      metoprolol tartrate (LOPRESSOR) 25 MG tablet TAKE ONE TABLET BY MOUTH TWICE DAILY 60 tablet 5    doxazosin (CARDURA) 8 MG tablet Take 8 mg by mouth nightly      isosorbide mononitrate (IMDUR) 30 MG extended release tablet Take 30 mg by mouth daily      hydrALAZINE (APRESOLINE) 100 MG tablet Take 100 mg by mouth 2 times daily      Coenzyme Q10 (COQ10) 200 MG CAPS Take 200 mg by mouth nightly       bumetanide (BUMEX) 2 MG tablet 2 mg 2 times daily       aspirin 81 MG tablet Take 81 mg by mouth daily      atorvastatin (LIPITOR) 40 MG tablet Take 1 tablet by mouth daily. (Patient taking differently: Take 40 mg by mouth every other day ) 30 tablet 5    omeprazole (PRILOSEC) 20 MG capsule Take 20 mg by mouth daily.       gabapentin (NEURONTIN) 300 MG capsule Take 300 mg by mouth nightly        No current facility-administered medications for this encounter. Allergies  Patient has no known allergies. Current Medications  Current Outpatient Medications   Medication Sig Dispense Refill    FEROSUL 325 (65 Fe) MG tablet       TOUJEO MAX SOLOSTAR 300 UNIT/ML SOPN INJECT 40 UNITS SUBCUTANEOUSLY ONCE DAILY      magnesium gluconate (MAGONATE) 500 MG tablet Take 250 mg by mouth      CONTOUR NEXT TEST strip USE 1 STRIP TO CHECK GLUCOSE 4 TIMES DAILY  3    epoetin ceasar (EPOGEN;PROCRIT) 54789 UNIT/ML injection Inject 40,000 Units into the skin      acyclovir (ZOVIRAX) 800 MG tablet acyclovir 800 mg tablet   prn      MICROLET LANCETS MISC USE 1 TO CHECK GLUCOSE 4 TIMES DAILY  4    allopurinol (ZYLOPRIM) 300 MG tablet Take 300 mg by mouth daily      spironolactone (ALDACTONE) 25 MG tablet Take 25 mg by mouth 2 times daily      amLODIPine (NORVASC) 5 MG tablet Take 5 mg by mouth daily      metoprolol tartrate (LOPRESSOR) 25 MG tablet TAKE ONE TABLET BY MOUTH TWICE DAILY 60 tablet 5    doxazosin (CARDURA) 8 MG tablet Take 8 mg by mouth nightly      isosorbide mononitrate (IMDUR) 30 MG extended release tablet Take 30 mg by mouth daily      hydrALAZINE (APRESOLINE) 100 MG tablet Take 100 mg by mouth 2 times daily      Coenzyme Q10 (COQ10) 200 MG CAPS Take 200 mg by mouth nightly       bumetanide (BUMEX) 2 MG tablet 2 mg 2 times daily       aspirin 81 MG tablet Take 81 mg by mouth daily      atorvastatin (LIPITOR) 40 MG tablet Take 1 tablet by mouth daily. (Patient taking differently: Take 40 mg by mouth every other day ) 30 tablet 5    omeprazole (PRILOSEC) 20 MG capsule Take 20 mg by mouth daily.  gabapentin (NEURONTIN) 300 MG capsule Take 300 mg by mouth nightly        No current facility-administered medications for this encounter.         Social History    Social History     Socioeconomic History    Marital status:      Spouse name: None    Number of children: None    Years of education: None    Highest signs and nursing note reviewed. Constitutional:       General: He is not in acute distress. Appearance: He is well-developed. HENT:      Head: Normocephalic. Right Ear: External ear normal.      Left Ear: External ear normal.      Nose: Nose normal.   Eyes:      Conjunctiva/sclera: Conjunctivae normal.      Pupils: Pupils are equal, round, and reactive to light. Neck:      Vascular: No JVD. Trachea: No tracheal deviation. Cardiovascular:      Rate and Rhythm: Normal rate. Pulmonary:      Effort: Pulmonary effort is normal.   Abdominal:      General: There is no distension. Tenderness: There is no abdominal tenderness. Musculoskeletal:      Right hip: He exhibits bony tenderness. Lumbar back: He exhibits tenderness, pain and spasm. Skin:     General: Skin is warm and dry. Neurological:      Mental Status: He is alert and oriented to person, place, and time. Sensory: No sensory deficit. Psychiatric:         Behavior: Behavior normal.         Thought Content: Thought content normal.         Judgment: Judgment normal.         Diagnostics:     MRI exams received in the past 2 years:  Yes MRI Lumbar Spine       When 12/2019                                              Where [facility]       Imaging on chart: Yes         Imaging records requested: No    MRI LUMBAR SPINE [address] [employer] - 12/27/2019 6:00 AM  Indication: Chronic low back pain with right-sided sciatica for 8  years, right hip and leg pain  Comparison: MR 3/23/2018  Findings: This report assumes 5 lumbar-type vertebral bodies. Lumbar lordosis  and alignment are maintained. Vertebral body heights are maintained. No acute fracture or subluxation. No suspicious vertebral body  lesions. The conus and cauda equina appear unremarkable. The  paravertebral soft tissues appear normal.  L1-L2: Disc bulge indents and mildly flattens the ventral thecal sac.   Along with facet arthropathy, there is mild narrowing of the central  canal and neural foramina. L2-L3: Disc bulge indents and flattens the ventral thecal sac. Along  with facet arthropathy, there is mild/moderate central canal narrowing  and mild bilateral neural foraminal narrowing. L3-L4: Disc bulge indents the ventral thecal sac. Along with facet  arthropathy, there is mild-moderate central canal narrowing and  moderate bilateral neural foraminal narrowing. L4-L5: Disc bulge indents and flattens the ventral thecal sac. Along  with facet arthropathy, there is mild-moderate central canal narrowing  and moderate left and moderate-severe right neural foraminal  narrowing. Disc material abuts the undersurface of the exiting right  L4 nerve root. L5-S1: Central canal is widely patent. Facet arthropathy causes  moderate bilateral neural foraminal narrowing. Disc material closely  approximates and may abut the undersurface of both exiting L5 nerve  roots.     Impression  Multilevel degenerative change, described level by level above,  similar to 2018. Notably, there is moderate-severe right neural  foraminal narrowing at L4-L5 where disc/osteophyte material appears to  abut the exiting right L4 nerve root. Correlate with right L4  radiculopathy. Signed by Dr Esteafnia Adrian on 12/27/2019 8:08 AM    CT exam received during the last 12 months: No       When na                                              Where na       Imaging on chart: No         Imaging records requested: No     X-ray exam received during the last 12 months: Yes XR Lumbar Spine       When 12/2019                                              Where [facility]       Imaging on chart: Yes         Imaging records requested: No     Nerve Conduction Study/EMG:  No       When na                                              Where na       Imaging on chart: No         Imaging records requested:  No     Physical therapy: No       When: na                                               Where  na     Behavior Health No drug abuse or diversion were identified  [] Signs of potential drug abuse or diversion were identified see note  [] Random urine drug screen sent today  [x] Random urine drug screen not completed today  [x] Medication agreement with provider signed today  [] Medication agreement with provider on file signed   [] Medication regimen effective and continued  [x] Medication regimen not effective see plan for changes  [x] Ceasar Senior reviewed [id number]      CC:  CHERYL Barbour - CNP, 2/6/2020 at 4:45 PM    EMR dragon/transcription disclaimer: Much of this encounter note is electronic transcription/translation of spoken language to printed tach. Electronic translation of spoken language may be erroneous, or at times, nonsensical words or phrases may be inadvertently transcribed.  Although, I have reviewed the note for such errors, some may still exist.

## 2020-01-16 NOTE — PROGRESS NOTES
Nursing Admission Record    Current Issues / Falls / ER Visits:  New patient referral from [address] [place] [address] for chronic low back pain    Opioids Prescribed: None    Was Medication Brought to Appt: N/A    Hx of any Neck/Back Surgeries? None    Is Patient currently taking a blood thinner?  Yes, Aspirin    MRI exams received in the past 2 years:  Yes MRI Lumbar Spine       When 12/2019                                              Where [facility]       Imaging on chart: Yes         Imaging records requested: No    CT exam received during the last 12 months: No       When na                                              Where na       Imaging on chart: No         Imaging records requested: No    X-ray exam received during the last 12 months: Yes XR Lumbar Spine       When 12/2019                                              Where [facility]       Imaging on chart: Yes         Imaging records requested: No    Nerve Conduction Study/EMG:  No       When na                                              Where na       Imaging on chart: No         Imaging records requested: No    Physical therapy: No       When: na                        Where  na    Behavior Health No       When: na                        Where na    Labs  Yes       When: 12/2019                                             Where  Care Everywhere    PEG Score: 5.7    Last PEG Score: N/A    Annual ORT Score: 0    Annual PHQ Score: 3    Last UDS Results: N/A    Education Provided:  [x] Review of Alexsander  [x] Agreement Review  [x] PEG Score Calculated [x] PHQ Score Calculated [x] ORT Score Calculated    [] Compliance Issues Discussed [] Cognitive Behavior Needs [x] Exercise [] Review of Test [] Financial Issues  [x] Tobacco/Alcohol Use Reviewed [x] Teaching [x] New Patient [x] Picture Obtained    Physician Plan:  [] Outgoing Referral  [] Pharmacy Consult  [x] Test Ordered [] Prescription Ordered/Changed [] Blood Thinner Request Form  [] Obtained Test Results / Consult Notes  [x] UDS due at next visit, verified per EPIC      [] Suspected Physical Abuse or Suicide Risk assessed - IF YES COMPLETE QUESTIONS BELOW    If any of the following questions are answered yes - contact attending physician for referral:    Has been considering harming self to escape stress, pain problems? [] YES  [x] NO  Has a suicide plan? [] YES  [x] NO  Has attempted suicide in the past?   [] YES  [x] NO  Has a close friend or family member who committed suicide?   [] YES  [x] NO    Assessment Completed by:  Electronically signed by Binu Alvarado RN on 1/16/2020 at 10:08 AM

## 2020-01-17 ENCOUNTER — TELEPHONE (OUTPATIENT)
Dept: NEUROSURGERY | Age: 75
End: 2020-01-17

## 2020-01-29 ENCOUNTER — HOSPITAL ENCOUNTER (OUTPATIENT)
Dept: PAIN MANAGEMENT | Age: 75
Discharge: HOME | End: 2020-01-29
Payer: MEDICARE

## 2020-01-29 VITALS
TEMPERATURE: 96.8 F | HEART RATE: 47 BPM | SYSTOLIC BLOOD PRESSURE: 121 MMHG | OXYGEN SATURATION: 95 % | RESPIRATION RATE: 18 BRPM | DIASTOLIC BLOOD PRESSURE: 61 MMHG

## 2020-01-29 PROBLEM — M70.61 GREATER TROCHANTERIC BURSITIS OF RIGHT HIP: Status: ACTIVE | Noted: 2020-01-29

## 2020-01-29 PROCEDURE — 20611 DRAIN/INJ JOINT/BURSA W/US: CPT | Performed by: NURSE PRACTITIONER

## 2020-01-29 PROCEDURE — 20611 DRAIN/INJ JOINT/BURSA W/US: CPT

## 2020-01-29 PROCEDURE — 6360000002 HC RX W HCPCS

## 2020-01-29 PROCEDURE — 2500000003 HC RX 250 WO HCPCS

## 2020-01-29 RX ORDER — LIDOCAINE HYDROCHLORIDE 10 MG/ML
1 INJECTION, SOLUTION EPIDURAL; INFILTRATION; INTRACAUDAL; PERINEURAL ONCE
Status: DISCONTINUED | OUTPATIENT
Start: 2020-01-29 | End: 2020-01-31 | Stop reason: HOSPADM

## 2020-01-29 RX ORDER — BUPIVACAINE HYDROCHLORIDE 5 MG/ML
1 INJECTION, SOLUTION EPIDURAL; INTRACAUDAL ONCE
Status: DISCONTINUED | OUTPATIENT
Start: 2020-01-29 | End: 2020-01-31 | Stop reason: HOSPADM

## 2020-01-29 RX ORDER — TRIAMCINOLONE ACETONIDE 40 MG/ML
40 INJECTION, SUSPENSION INTRA-ARTICULAR; INTRAMUSCULAR ONCE
Status: DISCONTINUED | OUTPATIENT
Start: 2020-01-29 | End: 2020-01-31 | Stop reason: HOSPADM

## 2020-01-30 ENCOUNTER — HOSPITAL ENCOUNTER (OUTPATIENT)
Dept: NEUROLOGY | Age: 75
Discharge: HOME | End: 2020-01-30
Payer: MEDICARE

## 2020-01-30 PROCEDURE — 95911 NRV CNDJ TEST 9-10 STUDIES: CPT

## 2020-01-30 PROCEDURE — 95886 MUSC TEST DONE W/N TEST COMP: CPT | Performed by: PSYCHIATRY & NEUROLOGY

## 2020-01-30 PROCEDURE — 95911 NRV CNDJ TEST 9-10 STUDIES: CPT | Performed by: PSYCHIATRY & NEUROLOGY

## 2020-01-30 PROCEDURE — 95886 MUSC TEST DONE W/N TEST COMP: CPT

## 2020-02-06 ENCOUNTER — TELEPHONE (OUTPATIENT)
Dept: PAIN MANAGEMENT | Age: 75
End: 2020-02-06

## 2020-02-06 ASSESSMENT — ENCOUNTER SYMPTOMS: CONSTIPATION: 0

## 2020-02-06 NOTE — TELEPHONE ENCOUNTER
Procedure:   RIGHT TROCHANTERIC BURSA  Procedure Date:  [id number]    How are you feeling since your injection? DOING SOME BETTER MY HIP IS NOT HURTING AS MUCH        1. Do you feel like Donell Tena  got to the source of where your pain comes from?   YES

## 2020-02-20 ENCOUNTER — OFFICE VISIT (OUTPATIENT)
Dept: NEUROSURGERY | Age: 75
End: 2020-02-20
Payer: MEDICARE

## 2020-02-20 VITALS
HEART RATE: 70 BPM | SYSTOLIC BLOOD PRESSURE: 116 MMHG | OXYGEN SATURATION: 99 % | WEIGHT: 290 LBS | HEIGHT: 70 IN | DIASTOLIC BLOOD PRESSURE: 62 MMHG | BODY MASS INDEX: 41.52 KG/M2

## 2020-02-20 PROCEDURE — 99213 OFFICE O/P EST LOW 20 MIN: CPT | Performed by: NEUROLOGICAL SURGERY

## 2020-02-20 NOTE — PROGRESS NOTES
07/24/2017    CO2 23 07/24/2017    BUN 44 (H) 07/24/2017    CREATININE 2.7 (H) 07/24/2017    GLUCOSE 100 07/24/2017    CALCIUM 8.9 07/24/2017    PROT 6.9 10/05/2016    LABALBU 4.4 10/05/2016    BILITOT 0.5 10/05/2016    ALKPHOS 89 10/05/2016    AST 27 10/05/2016    ALT 33 10/05/2016    LABGLOM 23 (A) 07/24/2017    AGRATIO 1.8 08/08/2013    GLOB 2.5 10/05/2016     Lab Results   Component Value Date    INR 1.03 08/03/2015    INR 1.11 07/21/2015    INR 0.93 07/21/2015    PROTIME 13.2 08/03/2015    PROTIME 14.0 07/21/2015    PROTIME 12.2 07/21/2015       No recent images to view at today's visit. MRI Lumbar Spine 2018 Reviewed:  Narrative   MRI LUMBAR SPINE WO CONTRAST 3/23/2018 3:07 PM   HISTORY: M54.16   COMPARISON: None    TECHNIQUE: Multiplanar, multisequence MRI of the lumbar spine was   performed without the use of contrast.   FINDINGS:    Alignment: There are presumed to be 5 lumbar-type vertebrae, with the   most inferior being labeled as L5. Normal lumbar lordosis is   maintained. There is no evidence of listhesis or subluxation. Marrow signal: Type III degenerative endplate changes are seen at   L4-L5. Osteophytes are also noted at this level. Cord/Canal: The conus medullaris terminates at the level of L1. The   visualized spinal cord is normal in signal and morphology. Soft tissues: The surrounding soft tissues are unremarkable. Levels:    L1-L2: Mild disc space narrowing. No spinal canal or neuroforaminal   stenosis. L2-L3: Moderate facet hypertrophy. Small disc bulge. Mild   neuroforaminal stenosis. No spinal canal stenosis. L3-L4: Moderate facet and ligamentum flavum hypertrophy. Moderate   right and mild to moderate left neuroforaminal stenosis. No spinal   canal stenosis. L4-L5: Moderate, diffuse disc bulge. Mild left and moderate to marked   right facet hypertrophy. Moderate to marked right and moderate left   neuroforaminal stenosis. Mild spinal canal stenosis.     L5-S1: Small, diffuse disc bulge. Mild facet hypertrophy. Moderate   left and mild right neuroforaminal stenosis. No spinal canal stenosis.       Impression   1. Degenerative disc disease and facet hypertrophy at multiple levels. These findings contribute to moderate to marked neuroforaminal   stenosis at L4-L5. Other levels are described above. Signed by Dr Ivelisse Bentley on 3/23/2018 5:16 PM         Narrative   Exam: MRI LUMBAR SPINE [address] - 12/27/2019 6:00 AM   Indication: Chronic low back pain with right-sided sciatica for 8   years, right hip and leg pain   Comparison: MR 3/23/2018   Findings: This report assumes 5 lumbar-type vertebral bodies. Lumbar lordosis   and alignment are maintained. Vertebral body heights are maintained. No acute fracture or subluxation. No suspicious vertebral body   lesions. The conus and cauda equina appear unremarkable. The   paravertebral soft tissues appear normal.   L1-L2: Disc bulge indents and mildly flattens the ventral thecal sac. Along with facet arthropathy, there is mild narrowing of the central   canal and neural foramina. L2-L3: Disc bulge indents and flattens the ventral thecal sac. Along   with facet arthropathy, there is mild/moderate central canal narrowing   and mild bilateral neural foraminal narrowing. L3-L4: Disc bulge indents the ventral thecal sac. Along with facet   arthropathy, there is mild-moderate central canal narrowing and   moderate bilateral neural foraminal narrowing. L4-L5: Disc bulge indents and flattens the ventral thecal sac. Along   with facet arthropathy, there is mild-moderate central canal narrowing   and moderate left and moderate-severe right neural foraminal   narrowing. Disc material abuts the undersurface of the exiting right   L4 nerve root. L5-S1: Central canal is widely patent. Facet arthropathy causes   moderate bilateral neural foraminal narrowing.  Disc material closely   approximates and may abut the undersurface of both

## 2020-03-16 ENCOUNTER — CLINICAL SUPPORT (OUTPATIENT)
Dept: ONCOLOGY | Facility: CLINIC | Age: 75
End: 2020-03-16

## 2020-03-16 ENCOUNTER — LAB (OUTPATIENT)
Dept: ONCOLOGY | Facility: CLINIC | Age: 75
End: 2020-03-16

## 2020-03-16 VITALS
HEART RATE: 56 BPM | DIASTOLIC BLOOD PRESSURE: 70 MMHG | OXYGEN SATURATION: 96 % | TEMPERATURE: 97.7 F | RESPIRATION RATE: 16 BRPM | SYSTOLIC BLOOD PRESSURE: 120 MMHG

## 2020-03-16 DIAGNOSIS — N18.30 STAGE 3 CHRONIC KIDNEY DISEASE (HCC): ICD-10-CM

## 2020-03-16 DIAGNOSIS — N18.30 ANEMIA DUE TO STAGE 3 CHRONIC KIDNEY DISEASE (HCC): ICD-10-CM

## 2020-03-16 DIAGNOSIS — D63.1 ANEMIA DUE TO STAGE 3 CHRONIC KIDNEY DISEASE (HCC): ICD-10-CM

## 2020-03-16 LAB
BASOPHILS # BLD AUTO: 0.02 10*3/MM3 (ref 0–0.2)
BASOPHILS NFR BLD AUTO: 0.3 % (ref 0–1.5)
DEPRECATED RDW RBC AUTO: 47.4 FL (ref 37–54)
EOSINOPHIL # BLD AUTO: 0.16 10*3/MM3 (ref 0–0.4)
EOSINOPHIL NFR BLD AUTO: 2.5 % (ref 0.3–6.2)
ERYTHROCYTE [DISTWIDTH] IN BLOOD BY AUTOMATED COUNT: 13.4 % (ref 12.3–15.4)
HCT VFR BLD AUTO: 42.4 % (ref 37.5–51)
HGB BLD-MCNC: 13.3 G/DL (ref 13–17.7)
IMM GRANULOCYTES # BLD AUTO: 0.02 10*3/MM3 (ref 0–0.05)
IMM GRANULOCYTES NFR BLD AUTO: 0.3 % (ref 0–0.5)
LYMPHOCYTES # BLD AUTO: 1.12 10*3/MM3 (ref 0.7–3.1)
LYMPHOCYTES NFR BLD AUTO: 17.4 % (ref 19.6–45.3)
MCH RBC QN AUTO: 29.8 PG (ref 26.6–33)
MCHC RBC AUTO-ENTMCNC: 31.4 G/DL (ref 31.5–35.7)
MCV RBC AUTO: 94.9 FL (ref 79–97)
MONOCYTES # BLD AUTO: 0.51 10*3/MM3 (ref 0.1–0.9)
MONOCYTES NFR BLD AUTO: 7.9 % (ref 5–12)
NEUTROPHILS # BLD AUTO: 4.6 10*3/MM3 (ref 1.7–7)
NEUTROPHILS NFR BLD AUTO: 71.6 % (ref 42.7–76)
PLATELET # BLD AUTO: 181 10*3/MM3 (ref 140–450)
PMV BLD AUTO: 9.7 FL (ref 6–12)
RBC # BLD AUTO: 4.47 10*6/MM3 (ref 4.14–5.8)
WBC NRBC COR # BLD: 6.43 10*3/MM3 (ref 3.4–10.8)

## 2020-03-16 PROCEDURE — 99211 OFF/OP EST MAY X REQ PHY/QHP: CPT | Performed by: INTERNAL MEDICINE

## 2020-03-16 PROCEDURE — 85025 COMPLETE CBC W/AUTO DIFF WBC: CPT | Performed by: INTERNAL MEDICINE

## 2020-03-16 NOTE — PROGRESS NOTES
Patient here for lab evaluation to initiate Retacrit due to stage III chronic kidney disease.  States that he is feeling good today.  No c/o voiced.  Skin w/d to touch.  Color wnl.  Eating and drinking well.  Parameters set for Retacrit 40,000 units for Hgb <10 and Hct <30.  Reviewed labs with patient, Hgb 13.3 and Hct 42.4 today.  Patient does not meet guidelines to initiate Retacrit.  Will return in 8 weeks for same.  Instructed to call with any problems. Patient v/u.

## 2020-05-14 ENCOUNTER — CLINICAL SUPPORT (OUTPATIENT)
Dept: ONCOLOGY | Facility: CLINIC | Age: 75
End: 2020-05-14

## 2020-05-14 ENCOUNTER — LAB (OUTPATIENT)
Dept: ONCOLOGY | Facility: CLINIC | Age: 75
End: 2020-05-14

## 2020-05-14 VITALS
TEMPERATURE: 98.1 F | RESPIRATION RATE: 18 BRPM | OXYGEN SATURATION: 97 % | HEART RATE: 60 BPM | DIASTOLIC BLOOD PRESSURE: 80 MMHG | SYSTOLIC BLOOD PRESSURE: 134 MMHG

## 2020-05-14 DIAGNOSIS — D63.1 ANEMIA IN STAGE 3 CHRONIC KIDNEY DISEASE (HCC): ICD-10-CM

## 2020-05-14 DIAGNOSIS — N18.30 STAGE 3 CHRONIC KIDNEY DISEASE (HCC): ICD-10-CM

## 2020-05-14 DIAGNOSIS — D47.2 MGUS (MONOCLONAL GAMMOPATHY OF UNKNOWN SIGNIFICANCE): ICD-10-CM

## 2020-05-14 DIAGNOSIS — N18.30 ANEMIA IN STAGE 3 CHRONIC KIDNEY DISEASE (HCC): ICD-10-CM

## 2020-05-14 LAB
ALBUMIN SERPL-MCNC: 4.5 G/DL (ref 3.5–5.2)
ALBUMIN/GLOB SERPL: 1.7 G/DL
ALP SERPL-CCNC: 108 U/L (ref 39–117)
ALT SERPL W P-5'-P-CCNC: 24 U/L (ref 1–41)
ANION GAP SERPL CALCULATED.3IONS-SCNC: 14 MMOL/L (ref 5–15)
AST SERPL-CCNC: 17 U/L (ref 1–40)
BASOPHILS # BLD AUTO: 0.04 10*3/MM3 (ref 0–0.2)
BASOPHILS NFR BLD AUTO: 0.6 % (ref 0–1.5)
BILIRUB SERPL-MCNC: 0.4 MG/DL (ref 0.2–1.2)
BUN BLD-MCNC: 40 MG/DL (ref 8–23)
BUN/CREAT SERPL: 19.5 (ref 7–25)
CALCIUM SPEC-SCNC: 9.5 MG/DL (ref 8.6–10.5)
CHLORIDE SERPL-SCNC: 101 MMOL/L (ref 98–107)
CO2 SERPL-SCNC: 25 MMOL/L (ref 22–29)
CREAT BLD-MCNC: 2.05 MG/DL (ref 0.76–1.27)
DEPRECATED RDW RBC AUTO: 46.4 FL (ref 37–54)
EOSINOPHIL # BLD AUTO: 0.18 10*3/MM3 (ref 0–0.4)
EOSINOPHIL NFR BLD AUTO: 2.7 % (ref 0.3–6.2)
ERYTHROCYTE [DISTWIDTH] IN BLOOD BY AUTOMATED COUNT: 13.3 % (ref 12.3–15.4)
FERRITIN SERPL-MCNC: 389.9 NG/ML (ref 30–400)
GFR SERPL CREATININE-BSD FRML MDRD: 32 ML/MIN/1.73
GLOBULIN UR ELPH-MCNC: 2.7 GM/DL
GLUCOSE BLD-MCNC: 134 MG/DL (ref 65–99)
HCT VFR BLD AUTO: 43.9 % (ref 37.5–51)
HGB BLD-MCNC: 14 G/DL (ref 13–17.7)
IMM GRANULOCYTES # BLD AUTO: 0.01 10*3/MM3 (ref 0–0.05)
IMM GRANULOCYTES NFR BLD AUTO: 0.1 % (ref 0–0.5)
IRON 24H UR-MRATE: 83 MCG/DL (ref 59–158)
IRON SATN MFR SERPL: 24 % (ref 20–50)
LYMPHOCYTES # BLD AUTO: 1.02 10*3/MM3 (ref 0.7–3.1)
LYMPHOCYTES NFR BLD AUTO: 15 % (ref 19.6–45.3)
MCH RBC QN AUTO: 30.3 PG (ref 26.6–33)
MCHC RBC AUTO-ENTMCNC: 31.9 G/DL (ref 31.5–35.7)
MCV RBC AUTO: 95 FL (ref 79–97)
MONOCYTES # BLD AUTO: 0.47 10*3/MM3 (ref 0.1–0.9)
MONOCYTES NFR BLD AUTO: 6.9 % (ref 5–12)
NEUTROPHILS # BLD AUTO: 5.07 10*3/MM3 (ref 1.7–7)
NEUTROPHILS NFR BLD AUTO: 74.7 % (ref 42.7–76)
PLATELET # BLD AUTO: 200 10*3/MM3 (ref 140–450)
PMV BLD AUTO: 9.7 FL (ref 6–12)
POTASSIUM BLD-SCNC: 4.3 MMOL/L (ref 3.5–5.2)
PROT SERPL-MCNC: 7.2 G/DL (ref 6–8.5)
RBC # BLD AUTO: 4.62 10*6/MM3 (ref 4.14–5.8)
SODIUM BLD-SCNC: 140 MMOL/L (ref 136–145)
TIBC SERPL-MCNC: 349 MCG/DL (ref 298–536)
TRANSFERRIN SERPL-MCNC: 234 MG/DL (ref 200–360)
WBC NRBC COR # BLD: 6.79 10*3/MM3 (ref 3.4–10.8)

## 2020-05-14 PROCEDURE — 84466 ASSAY OF TRANSFERRIN: CPT | Performed by: INTERNAL MEDICINE

## 2020-05-14 PROCEDURE — 83883 ASSAY NEPHELOMETRY NOT SPEC: CPT | Performed by: INTERNAL MEDICINE

## 2020-05-14 PROCEDURE — 82607 VITAMIN B-12: CPT | Performed by: INTERNAL MEDICINE

## 2020-05-14 PROCEDURE — 84155 ASSAY OF PROTEIN SERUM: CPT | Performed by: INTERNAL MEDICINE

## 2020-05-14 PROCEDURE — 82728 ASSAY OF FERRITIN: CPT | Performed by: INTERNAL MEDICINE

## 2020-05-14 PROCEDURE — 85025 COMPLETE CBC W/AUTO DIFF WBC: CPT | Performed by: INTERNAL MEDICINE

## 2020-05-14 PROCEDURE — 82784 ASSAY IGA/IGD/IGG/IGM EACH: CPT | Performed by: INTERNAL MEDICINE

## 2020-05-14 PROCEDURE — 82746 ASSAY OF FOLIC ACID SERUM: CPT | Performed by: INTERNAL MEDICINE

## 2020-05-14 PROCEDURE — 83540 ASSAY OF IRON: CPT | Performed by: INTERNAL MEDICINE

## 2020-05-14 PROCEDURE — 80053 COMPREHEN METABOLIC PANEL: CPT | Performed by: INTERNAL MEDICINE

## 2020-05-14 PROCEDURE — 99211 OFF/OP EST MAY X REQ PHY/QHP: CPT | Performed by: NURSE PRACTITIONER

## 2020-05-14 PROCEDURE — 86334 IMMUNOFIX E-PHORESIS SERUM: CPT | Performed by: INTERNAL MEDICINE

## 2020-05-14 PROCEDURE — 84165 PROTEIN E-PHORESIS SERUM: CPT | Performed by: INTERNAL MEDICINE

## 2020-05-14 NOTE — PROGRESS NOTES
Patient here for lab evaluation to initiate Retacrit due to stage III chronic kidney disease.  States that he is feeling good today.  No c/o voiced.  Skin w/d to touch.  Color wnl.  Eating and drinking well.  Parameters set to initiate Retacrit 40,000 units SQ for Hgb <10 and Hct <30.  Reviewed labs with patient, Hgb 14 and Hct 43.9 today.  Patient labs have continued to improve and will not need injections for the foreseeable future.  Patient has an appointment with Dr White on 06/09-will discuss with him to discontinue lab visits between MD appointments.  Instructed patient that I will also discuss this with Dr White. No appointment with me made at this time.  Instructed to call with any problems.  Patient v/u.

## 2020-05-15 ENCOUNTER — TELEPHONE (OUTPATIENT)
Dept: ONCOLOGY | Facility: CLINIC | Age: 75
End: 2020-05-15

## 2020-05-15 DIAGNOSIS — E53.8 LOW VITAMIN B12 LEVEL: Primary | ICD-10-CM

## 2020-05-15 LAB
ALBUMIN SERPL-MCNC: 3.6 G/DL (ref 2.9–4.4)
ALBUMIN/GLOB SERPL: 1.3 {RATIO} (ref 0.7–1.7)
ALPHA1 GLOB FLD ELPH-MCNC: 0.2 G/DL (ref 0–0.4)
ALPHA2 GLOB SERPL ELPH-MCNC: 1 G/DL (ref 0.4–1)
B-GLOBULIN SERPL ELPH-MCNC: 1.1 G/DL (ref 0.7–1.3)
FOLATE SERPL-MCNC: 3.23 NG/ML (ref 4.78–24.2)
GAMMA GLOB SERPL ELPH-MCNC: 0.7 G/DL (ref 0.4–1.8)
GLOBULIN SER CALC-MCNC: 3 G/DL (ref 2.2–3.9)
IGA SERPL-MCNC: 173 MG/DL (ref 61–437)
IGG SERPL-MCNC: 631 MG/DL (ref 603–1613)
IGM SERPL-MCNC: 37 MG/DL (ref 15–143)
INTERPRETATION SERPL IEP-IMP: ABNORMAL
KAPPA LC SERPL-MCNC: 34.9 MG/L (ref 3.3–19.4)
KAPPA LC/LAMBDA SER: 2.04 {RATIO} (ref 0.26–1.65)
LAMBDA LC FREE SERPL-MCNC: 17.1 MG/L (ref 5.7–26.3)
Lab: ABNORMAL
M-SPIKE: 0.2 G/DL
PROT SERPL-MCNC: 6.6 G/DL (ref 6–8.5)
VIT B12 BLD-MCNC: 294 PG/ML (ref 211–946)

## 2020-05-15 NOTE — TELEPHONE ENCOUNTER
Notified pt. Sent script to [facility]. He will come to [place] next Thursday to recheck labs.        ----- Message from Phu White MD sent at 5/15/2020  7:37 AM CDT -----  Labs 5/14/2020-folate 3.23, B12 294 (H depressed).  Please: Folbee or Folbic p.o. daily dispense 30.  Repeat B12 and folate levels in 1 week.  Thank you

## 2020-05-21 ENCOUNTER — LAB (OUTPATIENT)
Dept: ONCOLOGY | Facility: CLINIC | Age: 75
End: 2020-05-21

## 2020-05-21 DIAGNOSIS — E53.8 LOW VITAMIN B12 LEVEL: ICD-10-CM

## 2020-05-21 DIAGNOSIS — E53.8 LOW VITAMIN B12 LEVEL: Primary | ICD-10-CM

## 2020-05-21 PROCEDURE — 82746 ASSAY OF FOLIC ACID SERUM: CPT | Performed by: NURSE PRACTITIONER

## 2020-05-21 PROCEDURE — 82607 VITAMIN B-12: CPT | Performed by: NURSE PRACTITIONER

## 2020-05-22 LAB
FOLATE SERPL-MCNC: 9.87 NG/ML (ref 4.78–24.2)
VIT B12 BLD-MCNC: 468 PG/ML (ref 211–946)

## 2020-06-03 NOTE — PROGRESS NOTES
MGW ONC [facility] ONCOLOGY  [address] [place] [address] [address]  [facility] [id number]  [phone]    Patient Name: Nicholas Moreau  Encounter Date: 06/09/2020  YOB: 1945  Patient Number: [id number]      REASON FOR VISIT: Mr. Nicholas Moreau is a 74-year-old medical patient of Dr. Stuart Davis in [place] but is also followed by Dr. Queen of nephrology for his history of Stage IV chronic kidney disease and acute tubular necrosis. He is seen in followup of anemia and monoclonal gammopathy of undetermined significance (MGUS). He has been on Procrit and B12 beginning 10/09/2017. He is here alone (previously with his spouse, Alona). History is obtained from the patient who is considered reliable.     DIAGNOSTIC ABNORMALITIES:   1. Review of the only available labs from the referring office dates back to 08/11/2017 when the hemoglobin was 8.6, hematocrit 25.2, MCV 84, platelets 193,000, WBC 3.9 with 57 segs, 32 lymphocytes, 8 monocytes, 2 eosinophils (ANC 2.2). Accompanying serum iron was 84, iron saturation 31%, ferritin 459, B12 of 427, folic acid 7.3, TIBC 273 (each within reference range).  2. When last seen by Dr. Queen on 09/20/2017, labs from 08/10/2017 were referenced that showed a creatinine of 3.1, BUN 63 (GFR not given), hemoglobin 8.6.   3. Labs, 10/30/2017: Hemoglobin 8.8, hematocrit 24.4, MCV 90.3, platelets 252,000, WBC 5.7 with a normal differential. CMP glucose 171, BUN 72, creatinine 2.6, , uric acid 14.9, phosphorus 5.8 (each elevated), GFR 26 otherwise normal, calcium 8.6, and normal liver enzymes. Serum iron was 79, saturation 22.07, ferritin 380, TIBC 358 (each normal), TSH 2.6 (within reference range), T4 of 15.9 (4.9 - 12.3). TONNY negative. SPIEP: IgG 503 (791 - 1643), IgA 190 (66 - 436), IgM 43 (43 - 279). Immunofixation electrophoresis: Faint IgG lambda monoclonal immunoglobulin. Monoclonal immunoglobulin concentration too low to quantify by  densitometry.  4. Labs, 11/16/2017: UPIEP with 24-hour urine protein of 1799. Immunofixation: No monoclonality detected.  5. Bone survey obtained at [facility] on 11/22/2017 reveals: 1 cm lytic lesion observed in the proximal right humerus, significance uncertain. Correlate with patient presentation and lab values. Degenerative changes diffusely in the cervical, thoracic, and lumbar spine. Degenerative changes in the hip joints, knees, and hands. No definite additional lytic lesions in the axial or appendicular skeleton.   6. Labs, 11/14/2017: Obion free light chains (serum) 4.31 (0.3 - 1.94), lambda free light chains 1.84 (0.57 - 2.63), kappa/lambda light chain ratio 2.34 (0.26 - 1.65).  7. Bone marrow biopsy, 12/01/2017: Final Diagnosis. Bone marrow, biopsy: A) Normocellular bone marrow for the patient's age with maturing trilineage hematopoiesis. B) Mild leukopenia by hematologic data. C) No atypical lymphoid or plasmacytoid aggregates identified. D) Negative for evidence of metastatic malignancy or granulomatous disease. E) Negative for increased blasts. F) Decreased stainable iron by special stain (1+ on a scale of 1-5).   8. CT chest, abdomen, pelvis without contrast, 12/04/2017, [facility]. Impression chest: No evidence of acute cardiothoracic process. Impression abdomen/pelvis: No evidence of acute abdominopelvic process; sclerotic change head of left femur which may represent early avascular necrosis.   9. CT upper right extremity obtained at [facility] 01/24/2018 reveals no worrisome bony lesions are seen in the RIGHT humerus. The cortical defect in the proximal humerus is consistent with a previous biceps tenodesis. Moderate degenerative changes in the acromioclavicular and glenohumeral joints. This is in comparison to bone survey 11/22/2017.  10. EGD, 03/27/2018 (Dr. Hahn): Impressions: 1) Hiatal hernia. 2) Distal esophageal stricture. 3) Status post esophageal  dilation using Savary dilators over an endoscopically placed guidewire. Path: H pylori negative.   11. Colonoscopy, 03/27/2018 (Dr. Hahn). Impressions: 1) Diverticulosis. 2) Multiple colon polyps. 3) Status post colon polypectomies by cold snare cold biopsy. 4) Status post mucosal resection of colon polyps. 5) Internal hemorrhoids. Recommendations: 1) Repeat colonoscopy 1 year. 2) Continue the omeprazole OTC 20 mg daily which is controlling this patient's reflux symptoms. 3) No source of this patient's anemia was identified on the EGD or colonoscopy. Pathology: Glandular adenomatous polyps. No malignancy.     PREVIOUS INTERVENTIONS:   1. B12, 1000 mcg im daily x3 then weekly x4 beginning 10/09/2017 through 11/07/2017.  2. Procrit beginning 10/09/2017 through 10/30/2017.        Problem List Items Addressed This Visit        Immune and Lymphatic    MGUS (monoclonal gammopathy of unknown significance) - Primary         No history exists.       PAST MEDICAL HISTORY:  ALLERGIES:  No Known Allergies  CURRENT MEDICATIONS:  Outpatient Encounter Medications as of 6/9/2020   Medication Sig Dispense Refill   • allopurinol (ZYLOPRIM) 300 MG tablet Take 300 mg by mouth Daily.  1   • amLODIPine (NORVASC) 5 MG tablet Take 5 mg by mouth every night at bedtime.  1   • aspirin 81 MG chewable tablet Chew 81 mg Daily.     • atorvastatin (LIPITOR) 40 MG tablet Take 40 mg by mouth Every Night.     • bumetanide (BUMEX) 1 MG tablet Take 1 mg by mouth 2 (Two) Times a Day.     • coenzyme Q10 100 MG capsule Take 200 mg by mouth Daily.     • CONTOUR NEXT TEST test strip 1 each by Other route 4 (Four) Times a Day. use 1 strip to check glucose 4 times daily  3   • doxazosin (CARDURA) 8 MG tablet Take 8 mg by mouth Every Night.     • folic acid-vit B6-vit B12 (Folbee) 2.5-25-1 MG tablet tablet Take 1 tablet by mouth Daily. 30 tablet 0   • gabapentin (NEURONTIN) 300 MG capsule Take 300 mg by mouth Daily.     • hydrALAZINE (APRESOLINE) 100 MG  tablet      • isosorbide mononitrate (IMDUR) 30 MG 24 hr tablet Take 30 mg by mouth Daily.     • metoprolol tartrate (LOPRESSOR) 25 MG tablet Take 25 mg by mouth.     • MICROLET LANCETS misc USE 1 TO CHECK GLUCOSE 4 TIMES DAILY  4   • omeprazole (priLOSEC) 20 MG capsule Take 20 mg by mouth Daily.     • spironolactone (ALDACTONE) 25 MG tablet Take 25 mg by mouth 2 (Two) Times a Day.     • [DISCONTINUED] ferrous sulfate 325 (65 FE) MG tablet Take 1 tablet by mouth Daily With Breakfast. 30 tablet 3   • [DISCONTINUED] TOUJEO MAX SOLOSTAR 300 UNIT/ML solution pen-injector injection        Facility-Administered Encounter Medications as of 6/9/2020   Medication Dose Route Frequency Provider Last Rate Last Dose   • epoetin marium (EPOGEN,PROCRIT) injection 40,000 Units  40,000 Units Subcutaneous Q28 Days Phu White MD         ADULT ILLNESSES:   Anemia ( ICD-10:D64.9 ;Anemia, unspecified   Monoclonal gammopathy ( ICD-10:D47.2 ;Monoclonal gammopathy   Acute tubular necrosis ( ICD-10:N17.0 ;Acute kidney failure with tubular necrosis   Anemia due to chronic kidney disease ( ICD-10:D63.1 ;Anemia in chronic kidney disease   Aortic valve disease ( with prior replacement; ICD-10:I35.9 ;Nonrheumatic aortic valve disorder, unspecified )   Chronic congestive heart failure ( history of; ICD-10:I50.9 ;Heart failure, unspecified )   Chronic kidney disease ( ICD-10:N18.4 ;Chronic kidney disease, stage 4 (severe)   Colon polyp ( ICD-10:K63.5 ;Polyp of colon   Coronary artery disease ( ICD-10:I25.10 ;Atherosclerotic heart disease of native coronary artery without angina pectoris   Diabetes mellitus ( ICD-10:E11.9 ;Type 2 diabetes mellitus without complications   Edema ( ICD-10:R60.0 ;Localized edema   Fatigue ( ICD-10:R53.82 ;Chronic fatigue, unspecified   Hyperlipidemia ( ICD-10:E78.5 ;Hyperlipidemia, unspecified   Hypertension ( ICD-10:I10 ;Essential (primary) hypertension   Iron deficiency anemia (disorder) ( ICD-10:D50.9 ;Iron  "deficiency anemia, unspecified   Leukopenia ( ICD-10:D72.819 ;Decreased white blood cell count, unspecified   Obesity ( ICD-10:E66.9 ;Obesity, unspecified   Proteinuria ( ICD-10:R80.9 ;Proteinuria, unspecified    SURGERIES:   Colonoscopic polypectomy: Repeat colonoscopy, 03/04/2019. \"3 polyps.\" Repeat 3 years   Coronary artery bypass grafting, (CABG)   Shoulder repair, left rotator cuff, 2015   Colonoscopic polypectomy: Colonoscopy, 03/27/2018 (Dr. Hahn). Impressions: 1) Diverticulosis. 2) Multiple colon polyps. 3) Status post colon polypectomies by cold snare cold biopsy. 4) Status post mucosal resection of colon polyps. 5) Internal hemorrhoids. Recommendations: 1) Repeat colonoscopy 1 year. 2) Continue the omeprazole OTC 20 mg daily which is controlling this patient's reflux symptoms. 3) No source of this patient's anemia was identified on the EGD or colonoscopy. Pathology: Glandular adenomatous polyps. No malignancy   Replacement of aortic valve, 2015   EGD, 03/27/2018 (Dr. Hahn): Impressions: 1) Hiatal hernia. 2) Distal esophageal stricture. 3) Status post esophageal dilation using Savary dilators over an endoscopically placed guidewire. Path: H pylori negative   Cardiac catheterization with stent placement      ADULT ILLNESSES:  Patient Active Problem List   Diagnosis Code   • Stage 3 chronic kidney disease (CMS/[facility]) N18.3   • Anemia in stage 3 chronic kidney disease (CMS/[facility]) N18.3, D63.1   • MGUS (monoclonal gammopathy of unknown significance) D47.2   • Aortic stenosis I35.0   • CAD (coronary artery disease) I25.10   • Chronic diastolic CHF (congestive heart failure) (CMS/[facility]) I50.32   • Diabetes mellitus (CMS/[facility]) E11.9   • Family history of early CAD Z82.49   • History of coronary artery stent placement Z95.5   • Hyperlipemia E78.5   • Hypertension I10   • Jaw pain R68.84   • Obesity E66.9   • Pseudoaneurysm of femoral artery (CMS/[facility]) I72.4   • BARBER (dyspnea on exertion) R06.00   • SOB (shortness of " "breath) R06.02   • Valvular heart disease I38   • Bilateral carpal tunnel syndrome G56.03   • Chronic bilateral low back pain with right-sided sciatica M54.41, G89.29   • Greater trochanteric bursitis of right hip M70.61     SURGERIES:  Past Surgical History:   Procedure Laterality Date   • CARDIAC SURGERY       HEALTH MAINTENANCE ITEMS:  Health Maintenance Due   Topic Date Due   • URINE MICROALBUMIN  1945   • TDAP/TD VACCINES (1 - Tdap) 12/29/1956   • ZOSTER VACCINE (1 of 2) 12/29/1995   • Pneumococcal Vaccine Once at 65 Years Old  12/29/2010   • HEPATITIS C SCREENING  09/03/2019   • DIABETIC FOOT EXAM  09/03/2019   • HEMOGLOBIN A1C  09/03/2019   • DIABETIC EYE EXAM  09/03/2019   • LIPID PANEL  03/12/2020       <no information>  Last Completed Colonoscopy       Status Date      COLONOSCOPY Done 1/31/2018           There is no immunization history on file for this patient.  Last Completed Mammogram     Patient has no health maintenance due at this time            FAMILY HISTORY:  History reviewed. No pertinent family history.  SOCIAL HISTORY:  Social History     Socioeconomic History   • Marital status:      Spouse name: Not on file   • Number of children: Not on file   • Years of education: Not on file   • Highest education level: Not on file   Tobacco Use   • Smoking status: Former Smoker   • Smokeless tobacco: Never Used       REVIEW OF SYSTEMS:  Constitutional:   The patient's appetite is good \"keeping it on a short leash,\" but energy is only fair. He manages his ADLs including the chores, running errands, and driving. His activities are only somwhat impeded by lower back and hip pains. Says he was previously seen by ortho and has had scans showing \"bulging disks\". Injection therapy, most recently sometime 09/2018, saying again \"They never did any good.\" Says surgery previously discussed. Dr. Cornell.  \"They said it wouldn't help.\" He has lost 8 pounds (in addition to 2 pounds at his prior visit) since " "his last visit. He has no fevers, chills, or drenching night sweats. His sleep habits seem appropriate.  Ear/Nose/Throat/Mouth:   He is hard of hearing. He reports no ear pains or sinus symptoms. He has no sore throat, nosebleeds, or sore tongue. He has no headaches. He denies any hoarseness, change in voice quality, or hemoptysis.   Ocular:   He reports no eye pain, significant change in visual acuity, double vision, or blurry vision.  Respiratory:   He reports baseline exertional dyspnea and is sometimes short of breath with his routine activities. He has no chronic cough, significant shortness of breathing at rest, phlegm production, or unexplained chest wall pain.  Cardiovascular:   He reports no exertional chest pain, chest pressure, or chest heaviness. He reports no claudication. He reports no palpitations or symptomatic orthostasis.  Gastrointestinal:   He reports no dysphagia, nausea, vomiting, postprandial abdominal pain, bloating, cramping, change in bowel habits, or discoloration of the stool. He reports no rectal bleeding. He reports no constipation or diarrhea. Says stools for fecal occult blood (FOB) negative per Dr. Davis. Last EGD and colonoscopy by Dr. Hahn, 03/27/2018 (above). Had repeat last 03/04/2019. \"3 polyps.\" Repeat 3 years.  Genitourinary:   He reports no urinary burning, frequency, dribbling, or discoloration. He reports no difficulty controlling his bladder. He has no need to urinate frequently through the night.   Musculoskeletal:   He reports chronic arthralgias, especially the hips and lower back. He has no myalgias or nighttime leg cramping. Says he no longer gets pain shots to his back by Dr. Cornell, most recently sometime 09/2018. \"They never helped.\" Says surgery previously discussed. \"Now they say they wouldn't help.\"    Extremities:   He reports trouble with fluid retention and leg swelling.  Endocrine:   He reports no problems with excess thirst, excessive urination, vasomotor " "instability, or unexplained fatigue.  Heme/Lymphatic:   He reports no unexplained bleeding, bruising, petechial rashes, or swollen glands.  Skin:   He reports no itching, rashes, or lesions which won't heal.  Neuro:   He reports bouts of postural dizziness but no loss of consciousness, seizures, fainting spells. He reports no weakness of face, arms, or legs but has had right hip/buttock radiculitis. He has no difficulty with speech. He has no tremors.  Psych:   He seems generally satisfied with life. He denies depression. He reports no mood swings.        VITAL SIGNS: /68   Pulse 50   Temp 96.8 °F (36 °C)   Resp 16   Ht 177.8 cm (70\")   Wt 129 kg (284 lb 8 oz)   SpO2 95%   BMI 40.82 kg/m² Body surface area is 2.42 meters squared.  Pain Score    06/09/20 1003   PainSc: 0-No pain         PHYSICAL EXAMINATION:   General:   He is a very-pleasant, obese, but slimmer and modestly-kept elderly male who is comfortable at rest. He arrived in the exam room ambulatory. He appears to be his stated age. His skin color is normal.   Head/Neck:   The patient is anicteric and atraumatic. The mouth, and throat are clear. He is wearing a surgical mask today.  The trachea is midline. The neck is supple without evidence of jugular venous distention or cervical adenopathy. The mobile epidermoid (previously less than 1 cm) at the base of his neck on the right has flattened after it auto drained last 02/2019.  Eyes:   The pupils are equal, round, and reactive to light. The extraocular movements are full. There is no scleral jaundice or erythema.   Chest:   The respiratory efforts are normal and unhindered. The chest is clear to auscultation and percussion. There are no wheezes, rhonchi, rales, or asymmetry of breath sounds.  Cardiovascular:   The patient has a regular cardiac rate and rhythm with an aortic murmur at the bases but heard throughout. No rubs or gallops. The peripheral pulses are equal and full.  Abdomen:   The " belly is soft and morbidly obese with a large panniculus precluding an adequate exam. There is no rebound or guarding. There is no organomegaly, mass-effect, or tenderness. Bowel sounds are active and of normal character.  Extremities:   There is no evidence of cyanosis, clubbing, with 1+ (same) ankle edema.  Rheumatologic:   There is no overt evidence of rheumatoid deformities of the hands. There is no sausaging of the fingers. There is no sign of active synovitis. The gait is slow but independent.  Cutaneous:   There are no overt rashes, disseminated lesions, purpura, or petechiae.   Lymphatics:   There is no evidence of adenopathy in the cervical, supraclavicular, axillary, inguinal, or femoral areas.   Neurologic:   The patient is alert, oriented, cooperative, and pleasant. He is appropriately conversant. He ambulated into the exam room without assistance and transferred from chair to exam table unaided. There is no overt dysfunction of the motor, sensory, cerebellar systems.  Psych:   Mood and affect are appropriate for circumstance. Eye contact is appropriate. Normal judgement and decision making.         LABS    Lab Results - Last 18 Months   Lab Units 05/14/20  0846 03/16/20  0830 12/09/19  0850 10/28/19  0903 10/01/19  0850 09/03/19  0856 02/26/19  1700   HEMOGLOBIN g/dL 14.0 13.3 12.8* 12.7* 13.0 13.0 12.1*   HEMATOCRIT % 43.9 42.4 39.6 39.5 41.2 40.1 38.5*   MCV fL 95.0 94.9 94.5 94.3 94.5 92.0 97.2*   WBC 10*3/mm3 6.79 6.43 5.36 5.44 5.70 5.94 6.76   RDW % 13.3 13.4 13.1 13.1 13.0 12.7 13.6   MPV fL 9.7 9.7 9.6 9.9 9.8 9.8 11.1   PLATELETS 10*3/mm3 200 181 189 175 198 201 211   IMM GRAN % % 0.1 0.3 0.4 0.0 0.2 0.5 0.4   NEUTROS ABS 10*3/mm3 5.07 4.60 3.67 3.45 3.81 3.99 4.92   LYMPHS ABS 10*3/mm3 1.02 1.12 1.02 1.23 1.25 1.17 1.05   MONOS ABS 10*3/mm3 0.47 0.51 0.42 0.54 0.47 0.52 0.52   EOS ABS 10*3/mm3 0.18 0.16 0.20 0.18 0.14 0.20 0.20   BASOS ABS 10*3/mm3 0.04 0.02 0.03 0.04 0.02 0.03 0.04   IMMATURE  GRANS (ABS) 10*3/mm3 0.01 0.02 0.02 0.00 0.01 0.03 0.03   NRBC /100 WBC  --   --   --   --   --   --  0.0       Lab Results - Last 18 Months   Lab Units 05/14/20  0846 12/09/19  0850 08/06/19  1730 07/02/19  1600 05/14/19  1700  03/12/19  1600   GLUCOSE mg/dL 134* 202* 496* 328* 268*  --  191*   SODIUM mmol/L 140 142 133* 137 138  --  139   POTASSIUM mmol/L 4.3 3.8 4.6 4.5 4.7  --  4.4   CO2 mmol/L 25.0 28.0 25.0 27.0 28.0  --  27.0   CHLORIDE mmol/L 101 99 95* 97* 99  --  98   ANION GAP mmol/L 14.0 15.0 13.0 13.0 11.0  --  14.0*   CREATININE mg/dL 2.05* 2.02* 1.89* 1.82* 1.79*  --  2.03*   BUN mg/dL 40* 39* 38* 32* 33*  --  44*   BUN / CREAT RATIO  19.5 19.3 20.1 17.6 18.4  --  21.7   CALCIUM mg/dL 9.5 9.4 9.6 9.4 9.5  --  9.5   EGFR IF NONAFRICN AM mL/min/1.73 32* 33* 35* 37* 37*  --  32*   ALK PHOS U/L 108 111 165* 131* 99  --  102   TOTAL PROTEIN g/dL 7.2 7.2 6.8 6.7 6.3  --  6.4   ALT (SGPT) U/L 24 28 60* 51 47  --  54   AST (SGOT) U/L 17 18 31 28 29  --  29   BILIRUBIN mg/dL 0.4 0.3 0.5 0.6 0.5  --  0.6   ALBUMIN g/dL 4.50  3.6 4.50  3.7 4.30 4.50  3.6 4.10   < > 4.20  4.0   GLOBULIN gm/dL 2.7 2.7 2.5 2.2 2.2  --  2.2    < > = values in this interval not displayed.       Lab Results - Last 18 Months   Lab Units 05/14/20  0846 12/09/19  0850 07/02/19  1600 04/16/19  1500 03/19/19  1700 03/12/19  1600   M-SPIKE g/dL 0.2* 0.2* 0.3* 0.2* 0.2* 0.2*   KAPPA/LAMBDA RATIO, S  2.04* 2.02* 2.05* 2.18*  --  2.16*   FREE LAMBDA LIGHT CHAINS mg/L 17.1 17.2 18.0 15.7  --  13.5   IG KAPPA FREE LIGHT CHAIN mg/L 34.9* 34.7* 36.9* 34.3*  --  29.2*   REFERENCE LAB REPORT   --   --   --   --   --  See attached report       Lab Results - Last 18 Months   Lab Units 05/21/20  1521 05/14/20  0846 12/09/19  0850 08/06/19  1730 07/02/19  1600 05/14/19  1700 03/12/19  1600   IRON mcg/dL  --  83 61 79 73 91 83   TIBC mcg/dL  --  349 365 323 302 301 301   IRON SATURATION %  --  24 17* 24 24 30 28   FERRITIN ng/mL  --  389.90 445.60*  "373.00 343.00 356.00 393.00   FOLATE ng/mL 9.87 3.23* 5.47  --  8.11  --  7.37       ASSESSMENT:   1. Normocytic anemia. Contribution from chronic kidney disease. Hgb, 14; MCV 95, 05/14/2020 (prior: Hgb 8.6 - 13; MCV 84 - 97.2).   2. Chronic kidney disease, Stage III-IV. Stable, GFR 32 mL/min on 05/14/2020 (prior range: 24.8 - 37 mL/min). Followed by Dr. Queen   3. IgG lambda monoclonal gammopathy of undetermined significance (MGUS):   a. No associated hypercalcemia.   b. Monoclonal IgG less than 3 gm -631, 05/14/2020 (prior range: 503 - 664)/ M-spike 0.2 (prior: 0.2 - 0.3).   c. Stable renal dysfunction.   d. Equivocal right humerus lytic bone lesions.   e. Negative UPIEP for Bence-Tyler proteins.   f. Marrow biopsy, 12/01/2017 without monoclonal plasma cells.   g. Stable kappa/lambda ratio, 2.04 on 05/14/2020 (prior range: 2.02 - 2.34).  4. Proteinuria, NOS. Prior urine protein immunoelectrophoresis (UPIEP) with no monoclonality.   5. History of coronary artery disease.   6. Chronic congestive heart failure (CHF).   7. Aortic valve replacement.   8. Coronary artery bypass grafting (CABG).   9. Heart catheterization with stent placement.   10. Diabetes mellitus. Followed by Dr. Davis  11. Obesity.   12. Chronic fatigue, contributions from all the above.   13. 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 Office encounter from ortho - Dr. Newman, 01/29/2018. Patient left Dr. Newman's office without being seen. No explanation given.   14. Bone survey with sclerotic lesion of the left humerus, however, CT upper right extremity, 01/24/2018: No worrisome bony lesions are seen in the RIGHT humerus. The cortical defect in the proximal humerus is consistent with a previous biceps tenodesis. Moderate degenerative changes in the acromioclavicular and glenohumeral joints. This is in comparison to bone survey 11/22/2017.   15. Multiple colon polyps. Repeat colonoscopy, 03/04/2019. \"3 polyps.\" Repeat 3 " years.    RECOMMENDATIONS:   1.  Draw repeat B12/folate today   2.   Re: Apprise of labs from 05/14/2020 with stable anemia and otherwise normal CBC, low (stable) GFR, hyperglycemia, mild alk phos elevation and otherwise normal CMP, repleted serum iron, slightly low Fe sat (less than 20%), repleted ferritin (greater than 100), SPIEP stable - previously stable IgG lambda monoclonal immunoglobulin (631; from 550; from 579; from 664; from 595). M-spike 0.2 (range: 0.2 -0.3). Stable serum light chain kappa/lambda ratio.  Low B12 and folate (Folbee called in, 05/14/2020).  3.  Previously discussed the EGD and colonoscopy findings from 03/27/2018 (above) are noted.   4.  Stop ferrous sulfate.   5. Previously discussed labs from 11/14/2017 and 11/16/2017 with negative UPIEP, but slightly elevated kappa/lambda light chain ratio. Discussed the bone marrow biopsy from 12/01/2017. Normocellular with maturing trilineage hematopoiesis. No atypical lymphoid or plasmacytoid aggregates. No evidence of metastatic malignancy or granulomatous disease. No increased blasts. Decreased stainable iron.   6.  MGUS again discussed. I had noted that MGUS occurs in 1-3% of the general population. Four groups of patients with MGUS can be identified: 1) Those still alive with monoclonal gammopathy (19%); 2) Those whose serum immunoglobulin level increased to more than 3g/dL but who do not require any therapy (10%); 3) Patients who die without developing multiple myeloma or other related disease (47%); and 4) Patients who develop myeloma amyloidosis macroglobulinemia or another lymphoproliferative disorder (24%, 2/3 of these disorders from myeloma), which occurred at the median of 8 - 10 years.   7.  Previously discussed the rationale and potential toxicities (including the risk for increased mortality from stroke, myocardial infarction (MI), congestive heart failure (CHF), seizures, sepsis, allergic reactions) for HAROON support. Questions  answered.   8.  CBC every 8 weeks with Procrit 40,000 units subcutaneously if Hgb less 11 and less than 33 - BHP.   9.  Continue other currently identified medications.   10. Continue ongoing management per primary care physician and other specialists.   11. Advance Directive discussed.   12. Return to the [place] office in 24 weeks with pre-office SPIEP, kappa/lambda ratio, CMP, serum iron, Fe sat, ferritin, B12, folate, CBC and differential.     MEDICAL DECISION MAKING: Moderate Complexity   AMOUNT OF DATA: Moderate     I spent 25 total minutes, face-to-face, caring for Nicholas huertas.  Greater than 50% of this time involved counseling and/or coordination of care as documented within this note regarding the patient's illness(es), pros and cons of various treatment options, instructions and/or risk reduction.    cc: MD Stuart Marte MD-MD Dylon Lerner MD

## 2020-06-05 ENCOUNTER — TELEPHONE (OUTPATIENT)
Dept: ONCOLOGY | Facility: CLINIC | Age: 75
End: 2020-06-05

## 2020-06-05 NOTE — TELEPHONE ENCOUNTER
Pt called to confirm a lab isn't needed prior to 6/9/20 appt.    Confirmed pt's lab for 5/21/20.    Phone #:  [phone]

## 2020-06-09 ENCOUNTER — LAB (OUTPATIENT)
Dept: LAB | Facility: HOSPITAL | Age: 75
End: 2020-06-09

## 2020-06-09 ENCOUNTER — OFFICE VISIT (OUTPATIENT)
Dept: ONCOLOGY | Facility: CLINIC | Age: 75
End: 2020-06-09

## 2020-06-09 VITALS
HEIGHT: 70 IN | RESPIRATION RATE: 16 BRPM | BODY MASS INDEX: 40.73 KG/M2 | OXYGEN SATURATION: 95 % | WEIGHT: 284.5 LBS | HEART RATE: 50 BPM | TEMPERATURE: 96.8 F | SYSTOLIC BLOOD PRESSURE: 128 MMHG | DIASTOLIC BLOOD PRESSURE: 68 MMHG

## 2020-06-09 DIAGNOSIS — D47.2 MGUS (MONOCLONAL GAMMOPATHY OF UNKNOWN SIGNIFICANCE): Primary | ICD-10-CM

## 2020-06-09 DIAGNOSIS — D47.2 MGUS (MONOCLONAL GAMMOPATHY OF UNKNOWN SIGNIFICANCE): ICD-10-CM

## 2020-06-09 DIAGNOSIS — N18.9 CHRONIC KIDNEY DISEASE, UNSPECIFIED CKD STAGE: ICD-10-CM

## 2020-06-09 PROBLEM — G56.03 BILATERAL CARPAL TUNNEL SYNDROME: Status: ACTIVE | Noted: 2020-06-09

## 2020-06-09 PROBLEM — M54.41 CHRONIC BILATERAL LOW BACK PAIN WITH RIGHT-SIDED SCIATICA: Status: ACTIVE | Noted: 2020-01-16

## 2020-06-09 PROBLEM — M70.61 GREATER TROCHANTERIC BURSITIS OF RIGHT HIP: Status: ACTIVE | Noted: 2020-01-29

## 2020-06-09 PROBLEM — G89.29 CHRONIC BILATERAL LOW BACK PAIN WITH RIGHT-SIDED SCIATICA: Status: ACTIVE | Noted: 2020-01-16

## 2020-06-09 LAB
FOLATE SERPL-MCNC: >20 NG/ML (ref 4.78–24.2)
VIT B12 BLD-MCNC: 616 PG/ML (ref 211–946)

## 2020-06-09 PROCEDURE — 99214 OFFICE O/P EST MOD 30 MIN: CPT | Performed by: INTERNAL MEDICINE

## 2020-06-09 PROCEDURE — 36415 COLL VENOUS BLD VENIPUNCTURE: CPT

## 2020-06-09 PROCEDURE — 82607 VITAMIN B-12: CPT

## 2020-06-09 PROCEDURE — 82746 ASSAY OF FOLIC ACID SERUM: CPT

## 2020-07-27 ENCOUNTER — TELEPHONE (OUTPATIENT)
Dept: ONCOLOGY | Facility: CLINIC | Age: 75
End: 2020-07-27

## 2020-07-27 NOTE — TELEPHONE ENCOUNTER
Called and spoke with wife to ask if they needed the pt lab appt moved to [place]. She stated that she did. Call sent to Alysia to schedule.

## 2020-07-28 ENCOUNTER — APPOINTMENT (OUTPATIENT)
Dept: ONCOLOGY | Facility: HOSPITAL | Age: 75
End: 2020-07-28

## 2020-07-28 ENCOUNTER — APPOINTMENT (OUTPATIENT)
Dept: LAB | Facility: HOSPITAL | Age: 75
End: 2020-07-28

## 2020-07-29 DIAGNOSIS — N18.30 STAGE 3 CHRONIC KIDNEY DISEASE (HCC): Primary | ICD-10-CM

## 2020-07-30 ENCOUNTER — CLINICAL SUPPORT (OUTPATIENT)
Dept: ONCOLOGY | Facility: CLINIC | Age: 75
End: 2020-07-30

## 2020-07-30 ENCOUNTER — LAB (OUTPATIENT)
Dept: ONCOLOGY | Facility: CLINIC | Age: 75
End: 2020-07-30

## 2020-07-30 VITALS
SYSTOLIC BLOOD PRESSURE: 124 MMHG | RESPIRATION RATE: 18 BRPM | OXYGEN SATURATION: 96 % | DIASTOLIC BLOOD PRESSURE: 78 MMHG | HEART RATE: 56 BPM | TEMPERATURE: 98.1 F

## 2020-07-30 DIAGNOSIS — N18.30 STAGE 3 CHRONIC KIDNEY DISEASE (HCC): ICD-10-CM

## 2020-07-30 DIAGNOSIS — D47.2 MGUS (MONOCLONAL GAMMOPATHY OF UNKNOWN SIGNIFICANCE): ICD-10-CM

## 2020-07-30 LAB
ALBUMIN SERPL-MCNC: 4.4 G/DL (ref 3.5–5.2)
ALBUMIN/GLOB SERPL: 1.8 G/DL
ALP SERPL-CCNC: 108 U/L (ref 39–117)
ALT SERPL W P-5'-P-CCNC: 33 U/L (ref 1–41)
ANION GAP SERPL CALCULATED.3IONS-SCNC: 15 MMOL/L (ref 5–15)
AST SERPL-CCNC: 21 U/L (ref 1–40)
BASOPHILS # BLD AUTO: 0.04 10*3/MM3 (ref 0–0.2)
BASOPHILS NFR BLD AUTO: 0.6 % (ref 0–1.5)
BILIRUB SERPL-MCNC: 0.4 MG/DL (ref 0–1.2)
BUN SERPL-MCNC: 36 MG/DL (ref 8–23)
BUN/CREAT SERPL: 18.9 (ref 7–25)
CALCIUM SPEC-SCNC: 9.4 MG/DL (ref 8.6–10.5)
CHLORIDE SERPL-SCNC: 99 MMOL/L (ref 98–107)
CO2 SERPL-SCNC: 26 MMOL/L (ref 22–29)
CREAT SERPL-MCNC: 1.9 MG/DL (ref 0.76–1.27)
DEPRECATED RDW RBC AUTO: 45.9 FL (ref 37–54)
EOSINOPHIL # BLD AUTO: 0.23 10*3/MM3 (ref 0–0.4)
EOSINOPHIL NFR BLD AUTO: 3.5 % (ref 0.3–6.2)
ERYTHROCYTE [DISTWIDTH] IN BLOOD BY AUTOMATED COUNT: 12.9 % (ref 12.3–15.4)
GFR SERPL CREATININE-BSD FRML MDRD: 35 ML/MIN/1.73
GLOBULIN UR ELPH-MCNC: 2.5 GM/DL
GLUCOSE SERPL-MCNC: 141 MG/DL (ref 65–99)
HCT VFR BLD AUTO: 41.9 % (ref 37.5–51)
HGB BLD-MCNC: 13.5 G/DL (ref 13–17.7)
IMM GRANULOCYTES # BLD AUTO: 0.06 10*3/MM3 (ref 0–0.05)
IMM GRANULOCYTES NFR BLD AUTO: 0.9 % (ref 0–0.5)
LYMPHOCYTES # BLD AUTO: 1.43 10*3/MM3 (ref 0.7–3.1)
LYMPHOCYTES NFR BLD AUTO: 21.5 % (ref 19.6–45.3)
MCH RBC QN AUTO: 30.6 PG (ref 26.6–33)
MCHC RBC AUTO-ENTMCNC: 32.2 G/DL (ref 31.5–35.7)
MCV RBC AUTO: 95 FL (ref 79–97)
MONOCYTES # BLD AUTO: 0.6 10*3/MM3 (ref 0.1–0.9)
MONOCYTES NFR BLD AUTO: 9 % (ref 5–12)
NEUTROPHILS NFR BLD AUTO: 4.3 10*3/MM3 (ref 1.7–7)
NEUTROPHILS NFR BLD AUTO: 64.5 % (ref 42.7–76)
PLATELET # BLD AUTO: 194 10*3/MM3 (ref 140–450)
PMV BLD AUTO: 9.9 FL (ref 6–12)
POTASSIUM SERPL-SCNC: 4.6 MMOL/L (ref 3.5–5.2)
PROT SERPL-MCNC: 6.9 G/DL (ref 6–8.5)
RBC # BLD AUTO: 4.41 10*6/MM3 (ref 4.14–5.8)
SODIUM SERPL-SCNC: 140 MMOL/L (ref 136–145)
WBC # BLD AUTO: 6.66 10*3/MM3 (ref 3.4–10.8)

## 2020-07-30 PROCEDURE — 85025 COMPLETE CBC W/AUTO DIFF WBC: CPT | Performed by: INTERNAL MEDICINE

## 2020-07-30 PROCEDURE — 80053 COMPREHEN METABOLIC PANEL: CPT | Performed by: INTERNAL MEDICINE

## 2020-07-30 PROCEDURE — 99211 OFF/OP EST MAY X REQ PHY/QHP: CPT | Performed by: NURSE PRACTITIONER

## 2020-07-30 NOTE — PROGRESS NOTES
Patient here for lab evaluation to initiate Retacrit for stage III chronic kidney disease.  States that he is feeling ok today.  Gets tired with minimal exertion.  Skin w/d to touch.  Color wnl.  Eating and drinking well.  Parameters set to initiate Retacrit 40,000 units SQ for Hgb <10 and Hct <30.  Reviewed labs with patient, Hgb 13.5 and Hct 41.9 today.  Patient does not meet requirements to initiate Retacrit at this time.  Will return in 8 weeks for same.  Instructed to call with any problems.  Patient v/u.

## 2020-09-24 ENCOUNTER — CLINICAL SUPPORT (OUTPATIENT)
Dept: ONCOLOGY | Facility: CLINIC | Age: 75
End: 2020-09-24

## 2020-09-24 ENCOUNTER — LAB (OUTPATIENT)
Dept: ONCOLOGY | Facility: CLINIC | Age: 75
End: 2020-09-24

## 2020-09-24 VITALS
SYSTOLIC BLOOD PRESSURE: 118 MMHG | DIASTOLIC BLOOD PRESSURE: 64 MMHG | HEART RATE: 64 BPM | TEMPERATURE: 97.9 F | RESPIRATION RATE: 16 BRPM | OXYGEN SATURATION: 98 %

## 2020-09-24 DIAGNOSIS — D63.1 ANEMIA IN STAGE 3 CHRONIC KIDNEY DISEASE (HCC): ICD-10-CM

## 2020-09-24 DIAGNOSIS — N18.30 STAGE 3 CHRONIC KIDNEY DISEASE (HCC): Primary | ICD-10-CM

## 2020-09-24 DIAGNOSIS — N18.30 ANEMIA IN STAGE 3 CHRONIC KIDNEY DISEASE (HCC): ICD-10-CM

## 2020-09-24 DIAGNOSIS — D47.2 MGUS (MONOCLONAL GAMMOPATHY OF UNKNOWN SIGNIFICANCE): ICD-10-CM

## 2020-09-24 DIAGNOSIS — N18.30 STAGE 3 CHRONIC KIDNEY DISEASE (HCC): ICD-10-CM

## 2020-09-24 LAB
BASOPHILS # BLD AUTO: 0.03 10*3/MM3 (ref 0–0.2)
BASOPHILS NFR BLD AUTO: 0.5 % (ref 0–1.5)
DEPRECATED RDW RBC AUTO: 46.6 FL (ref 37–54)
EOSINOPHIL # BLD AUTO: 0.2 10*3/MM3 (ref 0–0.4)
EOSINOPHIL NFR BLD AUTO: 3.6 % (ref 0.3–6.2)
ERYTHROCYTE [DISTWIDTH] IN BLOOD BY AUTOMATED COUNT: 13.2 % (ref 12.3–15.4)
HCT VFR BLD AUTO: 39.9 % (ref 37.5–51)
HGB BLD-MCNC: 12.9 G/DL (ref 13–17.7)
IMM GRANULOCYTES # BLD AUTO: 0.01 10*3/MM3 (ref 0–0.05)
IMM GRANULOCYTES NFR BLD AUTO: 0.2 % (ref 0–0.5)
LYMPHOCYTES # BLD AUTO: 1.07 10*3/MM3 (ref 0.7–3.1)
LYMPHOCYTES NFR BLD AUTO: 19.2 % (ref 19.6–45.3)
MCH RBC QN AUTO: 30.6 PG (ref 26.6–33)
MCHC RBC AUTO-ENTMCNC: 32.3 G/DL (ref 31.5–35.7)
MCV RBC AUTO: 94.8 FL (ref 79–97)
MONOCYTES # BLD AUTO: 0.54 10*3/MM3 (ref 0.1–0.9)
MONOCYTES NFR BLD AUTO: 9.7 % (ref 5–12)
NEUTROPHILS NFR BLD AUTO: 3.73 10*3/MM3 (ref 1.7–7)
NEUTROPHILS NFR BLD AUTO: 66.8 % (ref 42.7–76)
PLATELET # BLD AUTO: 165 10*3/MM3 (ref 140–450)
PMV BLD AUTO: 9.6 FL (ref 6–12)
RBC # BLD AUTO: 4.21 10*6/MM3 (ref 4.14–5.8)
WBC # BLD AUTO: 5.58 10*3/MM3 (ref 3.4–10.8)

## 2020-09-24 PROCEDURE — 85025 COMPLETE CBC W/AUTO DIFF WBC: CPT | Performed by: INTERNAL MEDICINE

## 2020-09-24 PROCEDURE — 99211 OFF/OP EST MAY X REQ PHY/QHP: CPT | Performed by: NURSE PRACTITIONER

## 2020-09-24 NOTE — PROGRESS NOTES
Patient here for lab evaluation to initiate Retacrit for stage III chronic kidney disease.  States that he is feeling good today.  No c/o voiced.  Skin w/d to touch.  Color wnl.  Eating and drinking well.  Parameters set to initiate Retacrit 40,000 units SQ for Hgb <10 and Hct <30.  Reviewed labs with patient, Hgb 12.9 and Hct 39.9 today.  Patient does not meet guidelines to initiate Retacrit at this time.  Will return in 8 weeks for same.  Instructed to call with any problems.  Patient v/u.

## 2020-11-19 ENCOUNTER — CLINICAL SUPPORT (OUTPATIENT)
Dept: ONCOLOGY | Facility: CLINIC | Age: 75
End: 2020-11-19

## 2020-11-19 ENCOUNTER — LAB (OUTPATIENT)
Dept: ONCOLOGY | Facility: CLINIC | Age: 75
End: 2020-11-19

## 2020-11-19 VITALS
HEART RATE: 48 BPM | RESPIRATION RATE: 16 BRPM | TEMPERATURE: 97.7 F | DIASTOLIC BLOOD PRESSURE: 78 MMHG | SYSTOLIC BLOOD PRESSURE: 158 MMHG | OXYGEN SATURATION: 95 %

## 2020-11-19 DIAGNOSIS — D47.2 MGUS (MONOCLONAL GAMMOPATHY OF UNKNOWN SIGNIFICANCE): ICD-10-CM

## 2020-11-19 DIAGNOSIS — N18.9 CHRONIC KIDNEY DISEASE, UNSPECIFIED CKD STAGE: ICD-10-CM

## 2020-11-19 DIAGNOSIS — N18.31 STAGE 3A CHRONIC KIDNEY DISEASE (HCC): ICD-10-CM

## 2020-11-19 LAB
ALBUMIN SERPL-MCNC: 4.1 G/DL (ref 3.5–5.2)
ALBUMIN/GLOB SERPL: 1.6 G/DL
ALP SERPL-CCNC: 116 U/L (ref 39–117)
ALT SERPL W P-5'-P-CCNC: 29 U/L (ref 1–41)
ANION GAP SERPL CALCULATED.3IONS-SCNC: 10 MMOL/L (ref 5–15)
AST SERPL-CCNC: 21 U/L (ref 1–40)
BASOPHILS # BLD AUTO: 0.03 10*3/MM3 (ref 0–0.2)
BASOPHILS NFR BLD AUTO: 0.5 % (ref 0–1.5)
BILIRUB SERPL-MCNC: 0.4 MG/DL (ref 0–1.2)
BUN SERPL-MCNC: 37 MG/DL (ref 8–23)
BUN/CREAT SERPL: 20.6 (ref 7–25)
CALCIUM SPEC-SCNC: 9.1 MG/DL (ref 8.6–10.5)
CHLORIDE SERPL-SCNC: 103 MMOL/L (ref 98–107)
CO2 SERPL-SCNC: 25 MMOL/L (ref 22–29)
CREAT SERPL-MCNC: 1.8 MG/DL (ref 0.76–1.27)
DEPRECATED RDW RBC AUTO: 46.3 FL (ref 37–54)
EOSINOPHIL # BLD AUTO: 0.15 10*3/MM3 (ref 0–0.4)
EOSINOPHIL NFR BLD AUTO: 2.6 % (ref 0.3–6.2)
ERYTHROCYTE [DISTWIDTH] IN BLOOD BY AUTOMATED COUNT: 13.2 % (ref 12.3–15.4)
FERRITIN SERPL-MCNC: 410.3 NG/ML (ref 30–400)
GFR SERPL CREATININE-BSD FRML MDRD: 37 ML/MIN/1.73
GLOBULIN UR ELPH-MCNC: 2.5 GM/DL
GLUCOSE SERPL-MCNC: 193 MG/DL (ref 65–99)
HCT VFR BLD AUTO: 39.5 % (ref 37.5–51)
HGB BLD-MCNC: 12.7 G/DL (ref 13–17.7)
IMM GRANULOCYTES # BLD AUTO: 0.01 10*3/MM3 (ref 0–0.05)
IMM GRANULOCYTES NFR BLD AUTO: 0.2 % (ref 0–0.5)
IRON 24H UR-MRATE: 56 MCG/DL (ref 59–158)
IRON SATN MFR SERPL: 17 % (ref 20–50)
LYMPHOCYTES # BLD AUTO: 1.01 10*3/MM3 (ref 0.7–3.1)
LYMPHOCYTES NFR BLD AUTO: 17.5 % (ref 19.6–45.3)
MCH RBC QN AUTO: 30.7 PG (ref 26.6–33)
MCHC RBC AUTO-ENTMCNC: 32.2 G/DL (ref 31.5–35.7)
MCV RBC AUTO: 95.4 FL (ref 79–97)
MONOCYTES # BLD AUTO: 0.43 10*3/MM3 (ref 0.1–0.9)
MONOCYTES NFR BLD AUTO: 7.4 % (ref 5–12)
NEUTROPHILS NFR BLD AUTO: 4.15 10*3/MM3 (ref 1.7–7)
NEUTROPHILS NFR BLD AUTO: 71.8 % (ref 42.7–76)
PLATELET # BLD AUTO: 180 10*3/MM3 (ref 140–450)
PMV BLD AUTO: 10.1 FL (ref 6–12)
POTASSIUM SERPL-SCNC: 4.3 MMOL/L (ref 3.5–5.2)
PROT SERPL-MCNC: 6.6 G/DL (ref 6–8.5)
RBC # BLD AUTO: 4.14 10*6/MM3 (ref 4.14–5.8)
SODIUM SERPL-SCNC: 138 MMOL/L (ref 136–145)
TIBC SERPL-MCNC: 332 MCG/DL (ref 298–536)
TRANSFERRIN SERPL-MCNC: 223 MG/DL (ref 200–360)
WBC # BLD AUTO: 5.78 10*3/MM3 (ref 3.4–10.8)

## 2020-11-19 PROCEDURE — 82746 ASSAY OF FOLIC ACID SERUM: CPT | Performed by: INTERNAL MEDICINE

## 2020-11-19 PROCEDURE — 85025 COMPLETE CBC W/AUTO DIFF WBC: CPT | Performed by: INTERNAL MEDICINE

## 2020-11-19 PROCEDURE — 83540 ASSAY OF IRON: CPT | Performed by: INTERNAL MEDICINE

## 2020-11-19 PROCEDURE — 83883 ASSAY NEPHELOMETRY NOT SPEC: CPT | Performed by: INTERNAL MEDICINE

## 2020-11-19 PROCEDURE — 82607 VITAMIN B-12: CPT | Performed by: INTERNAL MEDICINE

## 2020-11-19 PROCEDURE — 99211 OFF/OP EST MAY X REQ PHY/QHP: CPT | Performed by: NURSE PRACTITIONER

## 2020-11-19 PROCEDURE — 82728 ASSAY OF FERRITIN: CPT | Performed by: INTERNAL MEDICINE

## 2020-11-19 PROCEDURE — 80053 COMPREHEN METABOLIC PANEL: CPT | Performed by: INTERNAL MEDICINE

## 2020-11-19 PROCEDURE — 84155 ASSAY OF PROTEIN SERUM: CPT | Performed by: INTERNAL MEDICINE

## 2020-11-19 PROCEDURE — 86334 IMMUNOFIX E-PHORESIS SERUM: CPT | Performed by: INTERNAL MEDICINE

## 2020-11-19 PROCEDURE — 84466 ASSAY OF TRANSFERRIN: CPT | Performed by: INTERNAL MEDICINE

## 2020-11-19 PROCEDURE — 82784 ASSAY IGA/IGD/IGG/IGM EACH: CPT | Performed by: INTERNAL MEDICINE

## 2020-11-19 PROCEDURE — 84165 PROTEIN E-PHORESIS SERUM: CPT | Performed by: INTERNAL MEDICINE

## 2020-11-19 NOTE — PROGRESS NOTES
Patient here for lab evaluation to initiate Retacrit for anemia due to stage III chronic kidney disease.  States that he is feeling good today.  No c/o voiced.  Skin w/d to touch.  Color wnl.  Eating and drinking well.  Parameters set to initiate Retacrit 40,000 units for Hgb <10 and Hct <30.  Reviewed labs with patient, Hgb 12.7 and Hct 39.5 today.  Patient does not meet parameters to initiate Retacrit at this time.  Will return in 8 weeks for same.  Instructed to call with any problems.  Patient v/u.

## 2020-11-20 LAB
ALBUMIN SERPL ELPH-MCNC: 3.4 G/DL (ref 2.9–4.4)
ALBUMIN/GLOB SERPL: 1.3 {RATIO} (ref 0.7–1.7)
ALPHA1 GLOB SERPL ELPH-MCNC: 0.2 G/DL (ref 0–0.4)
ALPHA2 GLOB SERPL ELPH-MCNC: 0.9 G/DL (ref 0.4–1)
B-GLOBULIN SERPL ELPH-MCNC: 1.1 G/DL (ref 0.7–1.3)
FOLATE SERPL-MCNC: 5.97 NG/ML (ref 4.78–24.2)
GAMMA GLOB SERPL ELPH-MCNC: 0.6 G/DL (ref 0.4–1.8)
GLOBULIN SER-MCNC: 2.8 G/DL (ref 2.2–3.9)
IGA SERPL-MCNC: 157 MG/DL (ref 61–437)
IGG SERPL-MCNC: 607 MG/DL (ref 603–1613)
IGM SERPL-MCNC: 36 MG/DL (ref 15–143)
INTERPRETATION SERPL IEP-IMP: ABNORMAL
KAPPA LC FREE SER-MCNC: 39.3 MG/L (ref 3.3–19.4)
KAPPA LC FREE/LAMBDA FREE SER: 2.34 {RATIO} (ref 0.26–1.65)
LABORATORY COMMENT REPORT: ABNORMAL
LAMBDA LC FREE SERPL-MCNC: 16.8 MG/L (ref 5.7–26.3)
M PROTEIN SERPL ELPH-MCNC: 0.2 G/DL
PROT SERPL-MCNC: 6.2 G/DL (ref 6–8.5)
VIT B12 BLD-MCNC: 308 PG/ML (ref 211–946)

## 2020-11-21 NOTE — PROGRESS NOTES
MGW ONC [facility] ONCOLOGY  [address] [place] [address] [address]  [facility] [id number]  [phone]    Patient Name: Nicholas Moreau  Encounter Date: 11/23/2020  YOB: 1945  Patient Number: [id number]      REASON FOR VISIT: Mr. Nicholas Moreau is a 74-year-old medical patient of Dr. Stuart Davis in [place] but is also followed by Dr. Queen of nephrology for his history of Stage IV chronic kidney disease and acute tubular necrosis. He is seen in followup of anemia and monoclonal gammopathy of undetermined significance (MGUS). He has been on Procrit and B12 beginning 10/09/2017. He is here alone (previously with his spouse, Alona). History is obtained from the patient who is considered reliable.     DIAGNOSTIC ABNORMALITIES:   1. Review of the only available labs from the referring office dates back to 08/11/2017 when the hemoglobin was 8.6, hematocrit 25.2, MCV 84, platelets 193,000, WBC 3.9 with 57 segs, 32 lymphocytes, 8 monocytes, 2 eosinophils (ANC 2.2). Accompanying serum iron was 84, iron saturation 31%, ferritin 459, B12 of 427, folic acid 7.3, TIBC 273 (each within reference range).  2. When last seen by Dr. Queen on 09/20/2017, labs from 08/10/2017 were referenced that showed a creatinine of 3.1, BUN 63 (GFR not given), hemoglobin 8.6.   3. Labs, 10/30/2017: Hemoglobin 8.8, hematocrit 24.4, MCV 90.3, platelets 252,000, WBC 5.7 with a normal differential. CMP glucose 171, BUN 72, creatinine 2.6, , uric acid 14.9, phosphorus 5.8 (each elevated), GFR 26 otherwise normal, calcium 8.6, and normal liver enzymes. Serum iron was 79, saturation 22.07, ferritin 380, TIBC 358 (each normal), TSH 2.6 (within reference range), T4 of 15.9 (4.9 - 12.3). TONNY negative. SPIEP: IgG 503 (791 - 1643), IgA 190 (66 - 436), IgM 43 (43 - 279). Immunofixation electrophoresis: Faint IgG lambda monoclonal immunoglobulin. Monoclonal immunoglobulin concentration too low to quantify by  densitometry.  4. Labs, 11/16/2017: UPIEP with 24-hour urine protein of 1799. Immunofixation: No monoclonality detected.  5. Bone survey obtained at [facility] on 11/22/2017 reveals: 1 cm lytic lesion observed in the proximal right humerus, significance uncertain. Correlate with patient presentation and lab values. Degenerative changes diffusely in the cervical, thoracic, and lumbar spine. Degenerative changes in the hip joints, knees, and hands. No definite additional lytic lesions in the axial or appendicular skeleton.   6. Labs, 11/14/2017: [place] free light chains (serum) 4.31 (0.3 - 1.94), lambda free light chains 1.84 (0.57 - 2.63), kappa/lambda light chain ratio 2.34 (0.26 - 1.65).  7. Bone marrow biopsy, 12/01/2017: Final Diagnosis. Bone marrow, biopsy: A) Normocellular bone marrow for the patient's age with maturing trilineage hematopoiesis. B) Mild leukopenia by hematologic data. C) No atypical lymphoid or plasmacytoid aggregates identified. D) Negative for evidence of metastatic malignancy or granulomatous disease. E) Negative for increased blasts. F) Decreased stainable iron by special stain (1+ on a scale of 1-5).   8. CT chest, abdomen, pelvis without contrast, 12/04/2017, [facility]. Impression chest: No evidence of acute cardiothoracic process. Impression abdomen/pelvis: No evidence of acute abdominopelvic process; sclerotic change head of left femur which may represent early avascular necrosis.   9. CT upper right extremity obtained at [facility] 01/24/2018 reveals no worrisome bony lesions are seen in the RIGHT humerus. The cortical defect in the proximal humerus is consistent with a previous biceps tenodesis. Moderate degenerative changes in the acromioclavicular and glenohumeral joints. This is in comparison to bone survey 11/22/2017.  10. EGD, 03/27/2018 (Dr. Hahn): Impressions: 1) Hiatal hernia. 2) Distal esophageal stricture. 3) Status post esophageal  dilation using Savary dilators over an endoscopically placed guidewire. Path: H pylori negative.   11. Colonoscopy, 03/27/2018 (Dr. Hahn). Impressions: 1) Diverticulosis. 2) Multiple colon polyps. 3) Status post colon polypectomies by cold snare cold biopsy. 4) Status post mucosal resection of colon polyps. 5) Internal hemorrhoids. Recommendations: 1) Repeat colonoscopy 1 year. 2) Continue the omeprazole OTC 20 mg daily which is controlling this patient's reflux symptoms. 3) No source of this patient's anemia was identified on the EGD or colonoscopy. Pathology: Glandular adenomatous polyps. No malignancy.     PREVIOUS INTERVENTIONS:   1. B12, 1000 mcg im daily x3 then weekly x4 beginning 10/09/2017 through 11/07/2017.  2. Procrit beginning 10/09/2017 through 10/30/2017.        Problem List Items Addressed This Visit        Immune and Lymphatic    MGUS (monoclonal gammopathy of unknown significance) - Primary        Oncology/Hematology History    No history exists.       PAST MEDICAL HISTORY:  ALLERGIES:  No Known Allergies  CURRENT MEDICATIONS:  Outpatient Encounter Medications as of 11/23/2020   Medication Sig Dispense Refill   • allopurinol (ZYLOPRIM) 300 MG tablet Take 300 mg by mouth Daily.  1   • amLODIPine (NORVASC) 5 MG tablet Take 5 mg by mouth every night at bedtime.  1   • aspirin 81 MG chewable tablet Chew 81 mg Daily.     • atorvastatin (LIPITOR) 40 MG tablet Take 40 mg by mouth Every Night.     • bumetanide (BUMEX) 1 MG tablet Take 1 mg by mouth 2 (Two) Times a Day.     • coenzyme Q10 100 MG capsule Take 200 mg by mouth Daily.     • CONTOUR NEXT TEST test strip 1 each by Other route 4 (Four) Times a Day. use 1 strip to check glucose 4 times daily  3   • doxazosin (CARDURA) 8 MG tablet Take 8 mg by mouth Every Night.     • folic acid-vit B6-vit B12 (Folbee) 2.5-25-1 MG tablet tablet Take 1 tablet by mouth Daily. 30 tablet 0   • gabapentin (NEURONTIN) 300 MG capsule Take 300 mg by mouth Daily.     •  hydrALAZINE (APRESOLINE) 100 MG tablet      • isosorbide mononitrate (IMDUR) 30 MG 24 hr tablet Take 30 mg by mouth Daily.     • metoprolol tartrate (LOPRESSOR) 25 MG tablet Take 25 mg by mouth.     • MICROLET LANCETS misc USE 1 TO CHECK GLUCOSE 4 TIMES DAILY  4   • omeprazole (priLOSEC) 20 MG capsule Take 20 mg by mouth Daily.     • spironolactone (ALDACTONE) 25 MG tablet Take 25 mg by mouth 2 (Two) Times a Day.     • Toujeo Max SoloStar 300 UNIT/ML solution pen-injector injection INJECT 45 UNITS SUBCUTANEOUSLY ONCE DAILY       Facility-Administered Encounter Medications as of 11/23/2020   Medication Dose Route Frequency Provider Last Rate Last Admin   • epoetin marium (EPOGEN,PROCRIT) injection 40,000 Units  40,000 Units Subcutaneous Q28 Days Phu White MD         ADULT ILLNESSES:   Anemia ( ICD-10:D64.9 ;Anemia, unspecified   Monoclonal gammopathy ( ICD-10:D47.2 ;Monoclonal gammopathy   Acute tubular necrosis ( ICD-10:N17.0 ;Acute kidney failure with tubular necrosis   Anemia due to chronic kidney disease ( ICD-10:D63.1 ;Anemia in chronic kidney disease   Aortic valve disease ( with prior replacement; ICD-10:I35.9 ;Nonrheumatic aortic valve disorder, unspecified )   Chronic congestive heart failure ( history of; ICD-10:I50.9 ;Heart failure, unspecified )   Chronic kidney disease ( ICD-10:N18.4 ;Chronic kidney disease, stage 4 (severe)   Colon polyp ( ICD-10:K63.5 ;Polyp of colon   Coronary artery disease ( ICD-10:I25.10 ;Atherosclerotic heart disease of native coronary artery without angina pectoris   Diabetes mellitus ( ICD-10:E11.9 ;Type 2 diabetes mellitus without complications   Edema ( ICD-10:R60.0 ;Localized edema   Fatigue ( ICD-10:R53.82 ;Chronic fatigue, unspecified   Hyperlipidemia ( ICD-10:E78.5 ;Hyperlipidemia, unspecified   Hypertension ( ICD-10:I10 ;Essential (primary) hypertension   Iron deficiency anemia (disorder) ( ICD-10:D50.9 ;Iron deficiency anemia, unspecified   Leukopenia (  "ICD-10:D72.819 ;Decreased white blood cell count, unspecified   Obesity ( ICD-10:E66.9 ;Obesity, unspecified   Proteinuria ( ICD-10:R80.9 ;Proteinuria, unspecified    SURGERIES:   Colonoscopic polypectomy: Repeat colonoscopy, 03/04/2019. \"3 polyps.\" Repeat 3 years   Coronary artery bypass grafting, (CABG)   Shoulder repair, left rotator cuff, 2015   Colonoscopic polypectomy: Colonoscopy, 03/27/2018 (Dr. Hahn). Impressions: 1) Diverticulosis. 2) Multiple colon polyps. 3) Status post colon polypectomies by cold snare cold biopsy. 4) Status post mucosal resection of colon polyps. 5) Internal hemorrhoids. Recommendations: 1) Repeat colonoscopy 1 year. 2) Continue the omeprazole OTC 20 mg daily which is controlling this patient's reflux symptoms. 3) No source of this patient's anemia was identified on the EGD or colonoscopy. Pathology: Glandular adenomatous polyps. No malignancy   Replacement of aortic valve, 2015   EGD, 03/27/2018 (Dr. Hahn): Impressions: 1) Hiatal hernia. 2) Distal esophageal stricture. 3) Status post esophageal dilation using Savary dilators over an endoscopically placed guidewire. Path: H pylori negative   Cardiac catheterization with stent placement      ADULT ILLNESSES:  Patient Active Problem List   Diagnosis Code   • Stage 3 chronic kidney disease N18.30   • Anemia in stage 3 chronic kidney disease N18.30, D63.1   • MGUS (monoclonal gammopathy of unknown significance) D47.2   • Aortic stenosis I35.0   • CAD (coronary artery disease) I25.10   • Chronic diastolic CHF (congestive heart failure) (CMS/[facility]) I50.32   • Diabetes mellitus (CMS/[facility]) E11.9   • Family history of early CAD Z82.49   • History of coronary artery stent placement Z95.5   • Hyperlipemia E78.5   • Hypertension I10   • Jaw pain R68.84   • Obesity E66.9   • Pseudoaneurysm of femoral artery (CMS/[facility]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.4   • BARBER (dyspnea on exertion) R06.00   • SOB (shortness of breath) R06.02   • Valvular heart disease I38   • Bilateral carpal " "tunnel syndrome G56.03   • Chronic bilateral low back pain with right-sided sciatica M54.41, G89.29   • Greater trochanteric bursitis of right hip M70.61     SURGERIES:  Past Surgical History:   Procedure Laterality Date   • CARDIAC SURGERY       HEALTH MAINTENANCE ITEMS:  Health Maintenance Due   Topic Date Due   • URINE MICROALBUMIN  1945   • TDAP/TD VACCINES (1 - Tdap) 12/29/1964   • ZOSTER VACCINE (1 of 2) 12/29/1995   • Pneumococcal Vaccine 65+ (1 of 1 - PPSV23) 12/29/2010   • HEPATITIS C SCREENING  09/03/2019   • DIABETIC FOOT EXAM  09/03/2019   • HEMOGLOBIN A1C  09/03/2019   • DIABETIC EYE EXAM  09/03/2019   • LIPID PANEL  03/12/2020   • INFLUENZA VACCINE  08/01/2020       <no information>  Last Completed Colonoscopy       Status Date      COLONOSCOPY Done 1/31/2018           There is no immunization history on file for this patient.  Last Completed Mammogram     Patient has no health maintenance due at this time            FAMILY HISTORY:  History reviewed. No pertinent family history.  SOCIAL HISTORY:  Social History     Socioeconomic History   • Marital status:      Spouse name: Not on file   • Number of children: Not on file   • Years of education: Not on file   • Highest education level: Not on file   Tobacco Use   • Smoking status: Former Smoker   • Smokeless tobacco: Never Used       REVIEW OF SYSTEMS:  Constitutional:   The patient's appetite is good \"too good,\" but energy is fair. He manages his ADLs including the chores, running errands, and driving. His activities are only somwhat impeded by lower back and hip pains. Says he was previously seen by ortho and has had scans showing \"bulging disks\". He has regained 2 pounds (had lost 10 pounds at his 2 prior visits) since his last visit. He has no fevers, chills, or drenching night sweats. His sleep habits seem appropriate.  Ear/Nose/Throat/Mouth:   He is hard of hearing. He reports no ear pains or sinus symptoms. He has no sore throat, " "nosebleeds, or sore tongue. He has no headaches. He denies any hoarseness, nor change in voice quality.   Ocular:   He reports no eye pain, significant change in visual acuity, double vision, or blurry vision. \"No.\"  Respiratory:   He reports baseline exertional dyspnea and is sometimes short of breath with his routine activities. \"Same.\" He has no chronic cough, significant shortness of breathing at rest, phlegm production, or unexplained chest wall pain.  Cardiovascular:   He reports no exertional chest pain, chest pressure, or chest heaviness. He reports no claudication. He reports no palpitations or symptomatic orthostasis.  Gastrointestinal:   He reports no dysphagia, nausea, vomiting, postprandial abdominal pain, bloating, cramping, change in bowel habits, or discoloration of the stool. He reports no rectal bleeding. He reports no constipation or diarrhea. Last EGD and colonoscopy by Dr. Hahn, 03/27/2018 (above). Had repeat last 03/04/2019. \"3 polyps.\" Repeat 3 years.  Genitourinary:   He reports no urinary burning, frequency, dribbling, or discoloration. He reports no difficulty controlling his bladder. He has no need to urinate frequently through the night.   Musculoskeletal:   He reports chronic arthralgias, especially the hips and lower back. He has no myalgias or nighttime leg cramping. Says he no longer gets pain shots to his back by pain management, most recently sometime 09/2018. Not since due to lack of efficacy.  Says surgery previously discussed. \"Some say it'll help, but others say no.\"    Extremities:   He reports trouble with fluid retention and leg swelling.  Endocrine:   He reports no problems with excess thirst, excessive urination, vasomotor instability, or unexplained fatigue.  Heme/Lymphatic:   He reports no unexplained bleeding, bruising, petechial rashes, or swollen glands.  Skin:   He reports no itching, rashes, or lesions which won't heal.  Neuro:   He reports bouts of postural " "dizziness but no loss of consciousness, seizures, fainting spells. He reports no weakness of face, arms, or legs but has had right hip/buttock radiculitis. He has no difficulty with speech. He has no tremors. Has paresthesias of the left middle finger, but nowhere else  Psych:   He seems generally satisfied with life. He denies depression. He reports no mood swings.      VITAL SIGNS: /76   Pulse 57   Temp 97 °F (36.1 °C)   Resp 16   Ht 177.8 cm (70\")   Wt 130 kg (286 lb 9.6 oz)   SpO2 96%   BMI 41.12 kg/m² Body surface area is 2.43 meters squared.  Pain Score    11/23/20 1101   PainSc: 0-No pain         PHYSICAL EXAMINATION:   General:   He is a very-pleasant, obese, modestly-kept elderly male who is comfortable at rest. He arrived in the exam room ambulatory. He appears to be his stated age. His skin color is normal.   Head/Neck:   The patient is anicteric and atraumatic. He is wearing a surgical mask today.  The trachea is midline. The neck is supple without evidence of jugular venous distention or cervical adenopathy. The mobile epidermoid cyst (previously less than 1 cm) at the base of his neck on the right has flattened after it auto drained last 02/2019 - has not recurred.  Eyes:   The pupils are equal, round, and reactive to light. The extraocular movements are full. There is no scleral jaundice or erythema.   Chest:   The respiratory efforts are normal and unhindered. The chest is clear to auscultation and percussion. There are no wheezes, rhonchi, rales, or asymmetry of breath sounds.  Cardiovascular:   The patient has a regular cardiac rate and rhythm with an aortic murmur at the bases but heard throughout. No rubs or gallops. The peripheral pulses are equal and full.  Abdomen:   The belly is morbidly obese with a large panniculus precluding an adequate exam. There is no rebound or guarding. There is no organomegaly, mass-effect, or tenderness. Bowel sounds are active and of normal " character.  Extremities:   There is no evidence of cyanosis, clubbing, with symmetrical 1+ (same) ankle edema.  Rheumatologic:   There is no overt evidence of rheumatoid deformities of the hands. There is no sausaging of the fingers. There is no sign of active synovitis. The gait is slow but independent.  Cutaneous:   There are no overt rashes, disseminated lesions, purpura, or petechiae.   Lymphatics:   There is no evidence of adenopathy in the cervical, supraclavicular, axillary, inguinal, or femoral areas.   Neurologic:   The patient is alert, oriented, cooperative, and pleasant. He is appropriately conversant. He ambulated into the exam room without assistance and transferred from chair to exam table unaided. There is no overt dysfunction of the motor, sensory, cerebellar systems.  Psych:   Mood and affect are appropriate for circumstance. Eye contact is appropriate. Normal judgement and decision making.         LABS    Lab Results - Last 18 Months   Lab Units 11/19/20  1306 09/24/20  0857 07/30/20  0849 05/14/20  0846 03/16/20  0830 12/09/19  0850   HEMOGLOBIN g/dL 12.7* 12.9* 13.5 14.0 13.3 12.8*   HEMATOCRIT % 39.5 39.9 41.9 43.9 42.4 39.6   MCV fL 95.4 94.8 95.0 95.0 94.9 94.5   WBC 10*3/mm3 5.78 5.58 6.66 6.79 6.43 5.36   RDW % 13.2 13.2 12.9 13.3 13.4 13.1   MPV fL 10.1 9.6 9.9 9.7 9.7 9.6   PLATELETS 10*3/mm3 180 165 194 200 181 189   IMM GRAN % % 0.2 0.2 0.9* 0.1 0.3 0.4   NEUTROS ABS 10*3/mm3 4.15 3.73 4.30 5.07 4.60 3.67   LYMPHS ABS 10*3/mm3 1.01 1.07 1.43 1.02 1.12 1.02   MONOS ABS 10*3/mm3 0.43 0.54 0.60 0.47 0.51 0.42   EOS ABS 10*3/mm3 0.15 0.20 0.23 0.18 0.16 0.20   BASOS ABS 10*3/mm3 0.03 0.03 0.04 0.04 0.02 0.03   IMMATURE GRANS (ABS) 10*3/mm3 0.01 0.01 0.06* 0.01 0.02 0.02       Lab Results - Last 18 Months   Lab Units 11/19/20  1306 07/30/20  0849 05/14/20  0846 12/09/19  0850 08/06/19  1730 07/02/19  1600   GLUCOSE mg/dL 193* 141* 134* 202* 496* 328*   SODIUM mmol/L 138 140 140 142 133* 137    POTASSIUM mmol/L 4.3 4.6 4.3 3.8 4.6 4.5   CO2 mmol/L 25.0 26.0 25.0 28.0 25.0 27.0   CHLORIDE mmol/L 103 99 101 99 95* 97*   ANION GAP mmol/L 10.0 15.0 14.0 15.0 13.0 13.0   CREATININE mg/dL 1.80* 1.90* 2.05* 2.02* 1.89* 1.82*   BUN mg/dL 37* 36* 40* 39* 38* 32*   BUN / CREAT RATIO  20.6 18.9 19.5 19.3 20.1 17.6   CALCIUM mg/dL 9.1 9.4 9.5 9.4 9.6 9.4   EGFR IF NONAFRICN AM mL/min/1.73 37* 35* 32* 33* 35* 37*   ALK PHOS U/L 116 108 108 111 165* 131*   TOTAL PROTEIN g/dL 6.6 6.9 7.2 7.2 6.8 6.7   ALT (SGPT) U/L 29 33 24 28 60* 51   AST (SGOT) U/L 21 21 17 18 31 28   BILIRUBIN mg/dL 0.4 0.4 0.4 0.3 0.5 0.6   ALBUMIN g/dL 4.10  3.4 4.40 4.50  3.6 4.50  3.7 4.30 4.50  3.6   GLOBULIN gm/dL 2.5 2.5 2.7 2.7 2.5 2.2       Lab Results - Last 18 Months   Lab Units 11/19/20  1306 05/14/20  0846 12/09/19  0850 07/02/19  1600   M-SPIKE g/dL 0.2* 0.2* 0.2* 0.3*   KAPPA/LAMBDA RATIO, S  2.34* 2.04* 2.02* 2.05*   FREE LAMBDA LIGHT CHAINS mg/L 16.8 17.1 17.2 18.0   IG KAPPA FREE LIGHT CHAIN mg/L 39.3* 34.9* 34.7* 36.9*       Lab Results - Last 18 Months   Lab Units 11/19/20  1306 06/09/20  1129 05/21/20  1521 05/14/20  0846 12/09/19  0850 08/06/19  1730 07/02/19  1600   IRON mcg/dL 56*  --   --  83 61 79 73   TIBC mcg/dL 332  --   --  349 365 323 302   IRON SATURATION % 17*  --   --  24 17* 24 24   FERRITIN ng/mL 410.30*  --   --  389.90 445.60* 373.00 343.00   FOLATE ng/mL 5.97 >20.00 9.87 3.23* 5.47  --  8.11       ASSESSMENT:   1. Normocytic anemia. Contribution from chronic kidney disease. Hgb, 12.7; MCV 95.4, 11/19/2020 (prior: Hgb 8.6 - 13; MCV 84 - 97.2).   2. Chronic kidney disease, Stage III-IV. Stable, GFR 37 mL/min on 11/19/2020 (prior range: 24.8 - 37 mL/min). Followed by Dr. Queen   3. IgG lambda monoclonal gammopathy of undetermined significance (MGUS):   a. No associated hypercalcemia.   b. Monoclonal IgG less than 3 gm -607, 11/19/2020 (prior range: 503 - 664)/ M-spike 0.2 (prior: 0.2 - 0.3).   c. Stable  "renal dysfunction.   d. Equivocal right humerus lytic bone lesions.   e. Negative UPIEP for Bence-Tyler proteins.   f. Marrow biopsy, 12/01/2017 without monoclonal plasma cells.   g. Stable kappa/lambda ratio, 2.34 on 11/19/2020 (prior range: 2.02 - 2.34).  4. Proteinuria, NOS. Prior urine protein immunoelectrophoresis (UPIEP) with no monoclonality.   5. History of coronary artery disease.   6. Chronic congestive heart failure (CHF).   7. Aortic valve replacement.   8. Coronary artery bypass grafting (CABG).   9. Heart catheterization with stent placement.   10. Diabetes mellitus. Followed by Dr. Davis  11. Obesity. Unchanged  12. Chronic fatigue, contributions from all the above.   13. 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 Office encounter from ortho - Dr. Newman, 01/29/2018. Patient left Dr. Newman's office without being seen. No explanation given.   14. Bone survey with sclerotic lesion of the left humerus, however, CT upper right extremity, 01/24/2018: No worrisome bony lesions are seen in the RIGHT humerus. The cortical defect in the proximal humerus is consistent with a previous biceps tenodesis. Moderate degenerative changes in the acromioclavicular and glenohumeral joints. This is in comparison to bone survey 11/22/2017.   15. Multiple colon polyps. Repeat colonoscopy, 03/04/2019. \"3 polyps.\" Repeat 3 years.    RECOMMENDATIONS:   1.   Re: Apprise of labs from 11/19/2020 with stable anemia and otherwise normal CBC, low (stable) GFR, hyperglycemia, normal alk phos and otherwise normal CMP, slightly depressed serum iron, slightly low Fe sat (17%), repleted ferritin (greater than 100), SPIEP stable - previously stable IgG lambda monoclonal immunoglobulin (607; from 631; from 550; from 579; from 664; from 595). M-spike 0.2 (range: 0.2 -0.3). Stable serum light chain kappa/lambda ratio, repleted B12 and folate.  3.  Previously discussed the EGD and colonoscopy findings from " 03/27/2018 (above) are noted.   4.  Rx:  Ferrous sulfate 325 po daily # 30 x 2 RF.   5.  Previously discussed labs from 11/14/2017 and 11/16/2017 with negative UPIEP, but slightly elevated kappa/lambda light chain ratio. Discussed the bone marrow biopsy from 12/01/2017. Normocellular with maturing trilineage hematopoiesis. No atypical lymphoid or plasmacytoid aggregates. No evidence of metastatic malignancy or granulomatous disease. No increased blasts. Decreased stainable iron.   6.  MGUS again discussed. I had noted that MGUS occurs in 1-3% of the general population. Four groups of patients with MGUS can be identified: 1) Those still alive with monoclonal gammopathy (19%); 2) Those whose serum immunoglobulin level increased to more than 3g/dL but who do not require any therapy (10%); 3) Patients who die without developing multiple myeloma or other related disease (47%); and 4) Patients who develop myeloma amyloidosis macroglobulinemia or another lymphoproliferative disorder (24%, 2/3 of these disorders from myeloma), which occurred at the median of 8 - 10 years.   7.  Previously discussed the rationale and potential toxicities (including the risk for increased mortality from stroke, myocardial infarction (MI), congestive heart failure (CHF), seizures, sepsis, allergic reactions) for HAROON support. Questions answered.   8.  CBC every 8 weeks with Procrit 40,000 units subcutaneously if Hgb less 11 and less than 33 - BHP.   9.  Continue other currently identified medications.   10. Continue ongoing management per primary care physician and other specialists.   11. Return to the [facility] office in 24 weeks with pre-office SPIEP, kappa/lambda ratio, CMP, serum iron, Fe sat, ferritin, B12, folate, CBC and differential.     MEDICAL DECISION MAKING: Moderate Complexity   AMOUNT OF DATA: Moderate     I spent - 25 total minutes, face-to-face, caring for Nicholas huertas.  Greater than 50% of this time involved counseling and/or  coordination of care as documented within this note regarding the patient's illness(es), pros and cons of various treatment options, instructions and/or risk reduction.    cc: MD Stuart Marte MD-Edenilson Queen MD

## 2020-11-23 ENCOUNTER — OFFICE VISIT (OUTPATIENT)
Dept: ONCOLOGY | Facility: CLINIC | Age: 75
End: 2020-11-23

## 2020-11-23 VITALS
WEIGHT: 286.6 LBS | SYSTOLIC BLOOD PRESSURE: 148 MMHG | BODY MASS INDEX: 41.03 KG/M2 | DIASTOLIC BLOOD PRESSURE: 76 MMHG | HEART RATE: 57 BPM | HEIGHT: 70 IN | OXYGEN SATURATION: 96 % | RESPIRATION RATE: 16 BRPM | TEMPERATURE: 97 F

## 2020-11-23 DIAGNOSIS — D47.2 MGUS (MONOCLONAL GAMMOPATHY OF UNKNOWN SIGNIFICANCE): Primary | ICD-10-CM

## 2020-11-23 DIAGNOSIS — R71.8 OTHER ABNORMALITY OF RED BLOOD CELLS: ICD-10-CM

## 2020-11-23 PROCEDURE — 99214 OFFICE O/P EST MOD 30 MIN: CPT | Performed by: INTERNAL MEDICINE

## 2020-11-23 RX ORDER — FERROUS SULFATE 325(65) MG
325 TABLET ORAL
Qty: 30 TABLET | Refills: 2 | Status: SHIPPED | OUTPATIENT
Start: 2020-11-23

## 2020-11-23 RX ORDER — INSULIN GLARGINE 300 U/ML
INJECTION, SOLUTION SUBCUTANEOUS
COMMUNITY
Start: 2020-11-02

## 2021-01-21 ENCOUNTER — LAB (OUTPATIENT)
Dept: ONCOLOGY | Facility: CLINIC | Age: 76
End: 2021-01-21

## 2021-01-21 ENCOUNTER — CLINICAL SUPPORT (OUTPATIENT)
Dept: ONCOLOGY | Facility: CLINIC | Age: 76
End: 2021-01-21

## 2021-01-21 VITALS
SYSTOLIC BLOOD PRESSURE: 132 MMHG | TEMPERATURE: 97.9 F | DIASTOLIC BLOOD PRESSURE: 78 MMHG | RESPIRATION RATE: 16 BRPM | OXYGEN SATURATION: 98 % | HEART RATE: 56 BPM

## 2021-01-21 DIAGNOSIS — R71.8 OTHER ABNORMALITY OF RED BLOOD CELLS: ICD-10-CM

## 2021-01-21 DIAGNOSIS — N18.30 ANEMIA IN STAGE 3 CHRONIC KIDNEY DISEASE (HCC): ICD-10-CM

## 2021-01-21 DIAGNOSIS — N18.30 STAGE 3 CHRONIC KIDNEY DISEASE (HCC): ICD-10-CM

## 2021-01-21 DIAGNOSIS — D47.2 MGUS (MONOCLONAL GAMMOPATHY OF UNKNOWN SIGNIFICANCE): ICD-10-CM

## 2021-01-21 DIAGNOSIS — N18.32 STAGE 3B CHRONIC KIDNEY DISEASE (HCC): ICD-10-CM

## 2021-01-21 DIAGNOSIS — D63.1 ANEMIA IN STAGE 3 CHRONIC KIDNEY DISEASE (HCC): ICD-10-CM

## 2021-01-21 LAB
ALBUMIN SERPL-MCNC: 4.2 G/DL (ref 3.5–5.2)
ALBUMIN/GLOB SERPL: 1.5 G/DL
ALP SERPL-CCNC: 117 U/L (ref 39–117)
ALT SERPL W P-5'-P-CCNC: 28 U/L (ref 1–41)
ANION GAP SERPL CALCULATED.3IONS-SCNC: 11 MMOL/L (ref 5–15)
AST SERPL-CCNC: 19 U/L (ref 1–40)
BASOPHILS # BLD AUTO: 0.03 10*3/MM3 (ref 0–0.2)
BASOPHILS NFR BLD AUTO: 0.5 % (ref 0–1.5)
BILIRUB SERPL-MCNC: 0.5 MG/DL (ref 0–1.2)
BUN SERPL-MCNC: 37 MG/DL (ref 8–23)
BUN/CREAT SERPL: 19.8 (ref 7–25)
CALCIUM SPEC-SCNC: 9.4 MG/DL (ref 8.6–10.5)
CHLORIDE SERPL-SCNC: 101 MMOL/L (ref 98–107)
CO2 SERPL-SCNC: 26 MMOL/L (ref 22–29)
CREAT SERPL-MCNC: 1.87 MG/DL (ref 0.76–1.27)
DEPRECATED RDW RBC AUTO: 45.8 FL (ref 37–54)
EOSINOPHIL # BLD AUTO: 0.15 10*3/MM3 (ref 0–0.4)
EOSINOPHIL NFR BLD AUTO: 2.3 % (ref 0.3–6.2)
ERYTHROCYTE [DISTWIDTH] IN BLOOD BY AUTOMATED COUNT: 12.9 % (ref 12.3–15.4)
FERRITIN SERPL-MCNC: 417.7 NG/ML (ref 30–400)
GFR SERPL CREATININE-BSD FRML MDRD: 35 ML/MIN/1.73
GLOBULIN UR ELPH-MCNC: 2.8 GM/DL
GLUCOSE SERPL-MCNC: 168 MG/DL (ref 65–99)
HCT VFR BLD AUTO: 43 % (ref 37.5–51)
HGB BLD-MCNC: 13.7 G/DL (ref 13–17.7)
IMM GRANULOCYTES # BLD AUTO: 0.01 10*3/MM3 (ref 0–0.05)
IMM GRANULOCYTES NFR BLD AUTO: 0.2 % (ref 0–0.5)
IRON 24H UR-MRATE: 82 MCG/DL (ref 59–158)
IRON SATN MFR SERPL: 23 % (ref 20–50)
LYMPHOCYTES # BLD AUTO: 1.2 10*3/MM3 (ref 0.7–3.1)
LYMPHOCYTES NFR BLD AUTO: 18.1 % (ref 19.6–45.3)
MCH RBC QN AUTO: 30.1 PG (ref 26.6–33)
MCHC RBC AUTO-ENTMCNC: 31.9 G/DL (ref 31.5–35.7)
MCV RBC AUTO: 94.5 FL (ref 79–97)
MONOCYTES # BLD AUTO: 0.55 10*3/MM3 (ref 0.1–0.9)
MONOCYTES NFR BLD AUTO: 8.3 % (ref 5–12)
NEUTROPHILS NFR BLD AUTO: 4.69 10*3/MM3 (ref 1.7–7)
NEUTROPHILS NFR BLD AUTO: 70.6 % (ref 42.7–76)
PLATELET # BLD AUTO: 184 10*3/MM3 (ref 140–450)
PMV BLD AUTO: 10.3 FL (ref 6–12)
POTASSIUM SERPL-SCNC: 4.2 MMOL/L (ref 3.5–5.2)
PROT SERPL-MCNC: 7 G/DL (ref 6–8.5)
RBC # BLD AUTO: 4.55 10*6/MM3 (ref 4.14–5.8)
SODIUM SERPL-SCNC: 138 MMOL/L (ref 136–145)
TIBC SERPL-MCNC: 361 MCG/DL (ref 298–536)
TRANSFERRIN SERPL-MCNC: 242 MG/DL (ref 200–360)
WBC # BLD AUTO: 6.63 10*3/MM3 (ref 3.4–10.8)

## 2021-01-21 PROCEDURE — 85025 COMPLETE CBC W/AUTO DIFF WBC: CPT | Performed by: NURSE PRACTITIONER

## 2021-01-21 PROCEDURE — 83540 ASSAY OF IRON: CPT | Performed by: NURSE PRACTITIONER

## 2021-01-21 PROCEDURE — 82728 ASSAY OF FERRITIN: CPT | Performed by: NURSE PRACTITIONER

## 2021-01-21 PROCEDURE — 84466 ASSAY OF TRANSFERRIN: CPT | Performed by: NURSE PRACTITIONER

## 2021-01-21 PROCEDURE — 80053 COMPREHEN METABOLIC PANEL: CPT | Performed by: NURSE PRACTITIONER

## 2021-01-21 PROCEDURE — 99211 OFF/OP EST MAY X REQ PHY/QHP: CPT | Performed by: NURSE PRACTITIONER

## 2021-01-21 NOTE — PROGRESS NOTES
Patient here for lab evaluation to initiate Retacrit for anemia due to stage IIIB chronic kidney disease.  States that he is feeling good today. No c/o voiced.  Skin w/d to touch.  Color wnl.  Eating and drinking well.  Parameters set to initiate Retacrit 40,000 units for Hgb <10 and Hct <30.  Reviewed labs with patient, Hgb 13.7 and Hct 43 today.  Patient does not meet requirements to initiate injections at this time.  Will return in 12 weeks for same.  Instructed to call with any problems.  Patient v/u.

## 2021-04-13 ENCOUNTER — TELEPHONE (OUTPATIENT)
Dept: ONCOLOGY | Facility: CLINIC | Age: 76
End: 2021-04-13

## 2021-04-13 NOTE — TELEPHONE ENCOUNTER
Caller: ZACHERY    Relationship to patient: WIFE ON  [employer]    Best call back number: [phone]    Chief complaint: WANTS TO CANCEL HIS APPT FOR 4-15-21 FOR LAB AND RN REVIEW, PT WILL BE OUT OF TOWN, HE DOES NOT WANT TO RESCHEDULE, HE AS ANOTHER APPT ON 5-20-21, PLEASE ADVISE?    Type of visit: LAB & RN REVIEW    Requested date: NA    If rescheduling, when is the original appointment: 4-15-21    Additional notes:NA

## 2021-05-20 ENCOUNTER — LAB (OUTPATIENT)
Dept: ONCOLOGY | Facility: CLINIC | Age: 76
End: 2021-05-20

## 2021-05-20 DIAGNOSIS — D47.2 MGUS (MONOCLONAL GAMMOPATHY OF UNKNOWN SIGNIFICANCE): ICD-10-CM

## 2021-05-20 DIAGNOSIS — R71.8 OTHER ABNORMALITY OF RED BLOOD CELLS: ICD-10-CM

## 2021-05-20 LAB
ALBUMIN SERPL-MCNC: 4.3 G/DL (ref 3.5–5.2)
ALBUMIN/GLOB SERPL: 1.8 G/DL
ALP SERPL-CCNC: 105 U/L (ref 39–117)
ALT SERPL W P-5'-P-CCNC: 29 U/L (ref 1–41)
ANION GAP SERPL CALCULATED.3IONS-SCNC: 8 MMOL/L (ref 5–15)
AST SERPL-CCNC: 19 U/L (ref 1–40)
BASOPHILS # BLD AUTO: 0.03 10*3/MM3 (ref 0–0.2)
BASOPHILS NFR BLD AUTO: 0.5 % (ref 0–1.5)
BILIRUB SERPL-MCNC: 0.4 MG/DL (ref 0–1.2)
BUN SERPL-MCNC: 34 MG/DL (ref 8–23)
BUN/CREAT SERPL: 20.2 (ref 7–25)
CALCIUM SPEC-SCNC: 9.2 MG/DL (ref 8.6–10.5)
CHLORIDE SERPL-SCNC: 104 MMOL/L (ref 98–107)
CO2 SERPL-SCNC: 27 MMOL/L (ref 22–29)
CREAT SERPL-MCNC: 1.68 MG/DL (ref 0.76–1.27)
DEPRECATED RDW RBC AUTO: 46.3 FL (ref 37–54)
EOSINOPHIL # BLD AUTO: 0.07 10*3/MM3 (ref 0–0.4)
EOSINOPHIL NFR BLD AUTO: 1.3 % (ref 0.3–6.2)
ERYTHROCYTE [DISTWIDTH] IN BLOOD BY AUTOMATED COUNT: 13.1 % (ref 12.3–15.4)
FERRITIN SERPL-MCNC: 492.6 NG/ML (ref 30–400)
GFR SERPL CREATININE-BSD FRML MDRD: 40 ML/MIN/1.73
GLOBULIN UR ELPH-MCNC: 2.4 GM/DL
GLUCOSE SERPL-MCNC: 177 MG/DL (ref 65–99)
HCT VFR BLD AUTO: 40.8 % (ref 37.5–51)
HGB BLD-MCNC: 12.9 G/DL (ref 13–17.7)
IMM GRANULOCYTES # BLD AUTO: 0.01 10*3/MM3 (ref 0–0.05)
IMM GRANULOCYTES NFR BLD AUTO: 0.2 % (ref 0–0.5)
LYMPHOCYTES # BLD AUTO: 0.87 10*3/MM3 (ref 0.7–3.1)
LYMPHOCYTES NFR BLD AUTO: 15.9 % (ref 19.6–45.3)
MCH RBC QN AUTO: 30.1 PG (ref 26.6–33)
MCHC RBC AUTO-ENTMCNC: 31.6 G/DL (ref 31.5–35.7)
MCV RBC AUTO: 95.3 FL (ref 79–97)
MONOCYTES # BLD AUTO: 0.41 10*3/MM3 (ref 0.1–0.9)
MONOCYTES NFR BLD AUTO: 7.5 % (ref 5–12)
NEUTROPHILS NFR BLD AUTO: 4.08 10*3/MM3 (ref 1.7–7)
NEUTROPHILS NFR BLD AUTO: 74.6 % (ref 42.7–76)
PLATELET # BLD AUTO: 155 10*3/MM3 (ref 140–450)
PMV BLD AUTO: 9.6 FL (ref 6–12)
POTASSIUM SERPL-SCNC: 4.2 MMOL/L (ref 3.5–5.2)
PROT SERPL-MCNC: 6.7 G/DL (ref 6–8.5)
RBC # BLD AUTO: 4.28 10*6/MM3 (ref 4.14–5.8)
SODIUM SERPL-SCNC: 139 MMOL/L (ref 136–145)
WBC # BLD AUTO: 5.47 10*3/MM3 (ref 3.4–10.8)

## 2021-05-20 PROCEDURE — 85025 COMPLETE CBC W/AUTO DIFF WBC: CPT | Performed by: INTERNAL MEDICINE

## 2021-05-20 PROCEDURE — 82784 ASSAY IGA/IGD/IGG/IGM EACH: CPT | Performed by: INTERNAL MEDICINE

## 2021-05-20 PROCEDURE — 82607 VITAMIN B-12: CPT | Performed by: INTERNAL MEDICINE

## 2021-05-20 PROCEDURE — 83883 ASSAY NEPHELOMETRY NOT SPEC: CPT | Performed by: INTERNAL MEDICINE

## 2021-05-20 PROCEDURE — 86334 IMMUNOFIX E-PHORESIS SERUM: CPT | Performed by: INTERNAL MEDICINE

## 2021-05-20 PROCEDURE — 82728 ASSAY OF FERRITIN: CPT | Performed by: INTERNAL MEDICINE

## 2021-05-20 PROCEDURE — 84165 PROTEIN E-PHORESIS SERUM: CPT | Performed by: INTERNAL MEDICINE

## 2021-05-20 PROCEDURE — 82746 ASSAY OF FOLIC ACID SERUM: CPT | Performed by: INTERNAL MEDICINE

## 2021-05-20 PROCEDURE — 80053 COMPREHEN METABOLIC PANEL: CPT | Performed by: INTERNAL MEDICINE

## 2021-05-21 ENCOUNTER — TELEPHONE (OUTPATIENT)
Dept: ONCOLOGY | Facility: CLINIC | Age: 76
End: 2021-05-21

## 2021-05-21 LAB
FOLATE SERPL-MCNC: 8.61 NG/ML (ref 4.78–24.2)
VIT B12 BLD-MCNC: 249 PG/ML (ref 211–946)

## 2021-05-21 RX ORDER — CYANOCOBALAMIN/FOLIC AC/VIT B6 1-2.5-25MG
1 TABLET ORAL DAILY
Qty: 30 TABLET | Refills: 0 | Status: SHIPPED | OUTPATIENT
Start: 2021-05-21 | End: 2021-06-18

## 2021-05-21 NOTE — TELEPHONE ENCOUNTER
----- Message from Phu White MD sent at 5/21/2021  7:45 AM CDT -----  B12 249-pls call in [facility] daily # 30

## 2021-05-21 NOTE — TELEPHONE ENCOUNTER
Spoke with Alona, the patient's wife and let her know of the B12 results. They will  at [facility].

## 2021-05-23 NOTE — PROGRESS NOTES
MGW ONC [facility] ONCOLOGY  [address] [place] [address] [address]  [facility] [id number]  [phone]    Patient Name: Nicholas Moreau  Encounter Date: 05/26/2021  YOB: 1945  Patient Number: [id number]    REASON FOR VISIT: Mr. Nicholas Moreau is a 75-year-old medical patient of Dr. Stuart Davis in [place] but is also followed by Dr. Queen of nephrology for his history of stage IV chronic kidney disease and acute tubular necrosis. He is seen in followup of anemia and monoclonal gammopathy of undetermined significance (MGUS). He has been on Procrit and B12 beginning 10/09/2017. He is here alone (previously with his spouse, Alona).     DIAGNOSTIC ABNORMALITIES:   1. Review of the only available labs from the referring office dates back to 08/11/2017 when the hemoglobin was 8.6, hematocrit 25.2, MCV 84, platelets 193,000, WBC 3.9 with 57 segs, 32 lymphocytes, 8 monocytes, 2 eosinophils (ANC 2.2). Accompanying serum iron was 84, iron saturation 31%, ferritin 459, B12 of 427, folic acid 7.3, TIBC 273 (each within reference range).  2. When last seen by Dr. Queen on 09/20/2017, labs from 08/10/2017 were referenced that showed a creatinine of 3.1, BUN 63 (GFR not given), hemoglobin 8.6.   3. Labs, 10/30/2017: Hemoglobin 8.8, hematocrit 24.4, MCV 90.3, platelets 252,000, WBC 5.7 with a normal differential. CMP glucose 171, BUN 72, creatinine 2.6, , uric acid 14.9, phosphorus 5.8 (each elevated), GFR 26 otherwise normal, calcium 8.6, and normal liver enzymes. Serum iron was 79, saturation 22.07, ferritin 380, TIBC 358 (each normal), TSH 2.6 (within reference range), T4 of 15.9 (4.9 - 12.3). TONNY negative. SPIEP: IgG 503 (791 - 1643), IgA 190 (66 - 436), IgM 43 (43 - 279). Immunofixation electrophoresis: Faint IgG lambda monoclonal immunoglobulin. Monoclonal immunoglobulin concentration too low to quantify by densitometry.  4. Labs, 11/16/2017: UPIEP with 24-hour urine protein of  1799. Immunofixation: No monoclonality detected.  5. Bone survey obtained at [facility] on 11/22/2017 reveals: 1 cm lytic lesion observed in the proximal right humerus, significance uncertain. Correlate with patient presentation and lab values. Degenerative changes diffusely in the cervical, thoracic, and lumbar spine. Degenerative changes in the hip joints, knees, and hands. No definite additional lytic lesions in the axial or appendicular skeleton.   6. Labs, 11/14/2017: [place] free light chains (serum) 4.31 (0.3 - 1.94), lambda free light chains 1.84 (0.57 - 2.63), kappa/lambda light chain ratio 2.34 (0.26 - 1.65).  7. Bone marrow biopsy, 12/01/2017: Final Diagnosis. Bone marrow, biopsy: A) Normocellular bone marrow for the patient's age with maturing trilineage hematopoiesis. B) Mild leukopenia by hematologic data. C) No atypical lymphoid or plasmacytoid aggregates identified. D) Negative for evidence of metastatic malignancy or granulomatous disease. E) Negative for increased blasts. F) Decreased stainable iron by special stain (1+ on a scale of 1-5).   8. CT chest, abdomen, pelvis without contrast, 12/04/2017, [facility]. Impression chest: No evidence of acute cardiothoracic process. Impression abdomen/pelvis: No evidence of acute abdominopelvic process; sclerotic change head of left femur which may represent early avascular necrosis.   9. CT upper right extremity obtained at [facility] 01/24/2018 reveals no worrisome bony lesions are seen in the RIGHT humerus. The cortical defect in the proximal humerus is consistent with a previous biceps tenodesis. Moderate degenerative changes in the acromioclavicular and glenohumeral joints. This is in comparison to bone survey 11/22/2017.  10. EGD, 03/27/2018 (Dr. Hahn): Impressions: 1) Hiatal hernia. 2) Distal esophageal stricture. 3) Status post esophageal dilation using Savary dilators over an endoscopically placed guidewire. Path:  H pylori negative.   11. Colonoscopy, 03/27/2018 (Dr. Hahn). Impressions: 1) Diverticulosis. 2) Multiple colon polyps. 3) Status post colon polypectomies by cold snare cold biopsy. 4) Status post mucosal resection of colon polyps. 5) Internal hemorrhoids. Recommendations: 1) Repeat colonoscopy 1 year. 2) Continue the omeprazole OTC 20 mg daily which is controlling this patient's reflux symptoms. 3) No source of this patient's anemia was identified on the EGD or colonoscopy. Pathology: Glandular adenomatous polyps. No malignancy.     PREVIOUS INTERVENTIONS:   1. B12, 1000 mcg im daily x3 then weekly x4 beginning 10/09/2017 through 11/07/2017.  2. Procrit beginning 10/09/2017 through 10/30/2017.        Problem List Items Addressed This Visit     None        Oncology/Hematology History    No history exists.       PAST MEDICAL HISTORY:  ALLERGIES:  No Known Allergies  CURRENT MEDICATIONS:  Outpatient Encounter Medications as of 5/26/2021   Medication Sig Dispense Refill   • allopurinol (ZYLOPRIM) 300 MG tablet Take 300 mg by mouth Daily.  1   • amLODIPine (NORVASC) 5 MG tablet Take 5 mg by mouth every night at bedtime.  1   • aspirin 81 MG chewable tablet Chew 81 mg Daily.     • atorvastatin (LIPITOR) 40 MG tablet Take 40 mg by mouth Every Night.     • bumetanide (BUMEX) 1 MG tablet Take 1 mg by mouth 2 (Two) Times a Day.     • coenzyme Q10 100 MG capsule Take 200 mg by mouth Daily.     • CONTOUR NEXT TEST test strip 1 each by Other route 4 (Four) Times a Day. use 1 strip to check glucose 4 times daily  3   • doxazosin (CARDURA) 8 MG tablet Take 8 mg by mouth Every Night.     • ferrous sulfate 325 (65 FE) MG tablet Take 1 tablet by mouth Daily With Breakfast. 30 tablet 2   • folic acid-vit B6-vit B12 (Folbee) 2.5-25-1 MG tablet tablet Take 1 tablet by mouth Daily. 30 tablet 0   • gabapentin (NEURONTIN) 300 MG capsule Take 300 mg by mouth Daily.     • hydrALAZINE (APRESOLINE) 100 MG tablet      • isosorbide mononitrate  (IMDUR) 30 MG 24 hr tablet Take 30 mg by mouth Daily.     • metoprolol tartrate (LOPRESSOR) 25 MG tablet Take 25 mg by mouth.     • MICROLET LANCETS misc USE 1 TO CHECK GLUCOSE 4 TIMES DAILY  4   • omeprazole (priLOSEC) 20 MG capsule Take 20 mg by mouth Daily.     • spironolactone (ALDACTONE) 25 MG tablet Take 25 mg by mouth 2 (Two) Times a Day.     • Toujeo Max SoloStar 300 UNIT/ML solution pen-injector injection INJECT 45 UNITS SUBCUTANEOUSLY ONCE DAILY       Facility-Administered Encounter Medications as of 5/26/2021   Medication Dose Route Frequency Provider Last Rate Last Admin   • epoetin marium (EPOGEN,PROCRIT) injection 40,000 Units  40,000 Units Subcutaneous Q28 Days Phu White MD         ADULT ILLNESSES:   Anemia ( ICD-10:D64.9 ;Anemia, unspecified   Monoclonal gammopathy ( ICD-10:D47.2 ;Monoclonal gammopathy   Acute tubular necrosis ( ICD-10:N17.0 ;Acute kidney failure with tubular necrosis   Anemia due to chronic kidney disease ( ICD-10:D63.1 ;Anemia in chronic kidney disease   Aortic valve disease ( with prior replacement; ICD-10:I35.9 ;Nonrheumatic aortic valve disorder, unspecified )   Chronic congestive heart failure ( history of; ICD-10:I50.9 ;Heart failure, unspecified )   Chronic kidney disease ( ICD-10:N18.4 ;Chronic kidney disease, stage 4 (severe)   Colon polyp ( ICD-10:K63.5 ;Polyp of colon   Coronary artery disease ( ICD-10:I25.10 ;Atherosclerotic heart disease of native coronary artery without angina pectoris   Diabetes mellitus ( ICD-10:E11.9 ;Type 2 diabetes mellitus without complications   Edema ( ICD-10:R60.0 ;Localized edema   Fatigue ( ICD-10:R53.82 ;Chronic fatigue, unspecified   Hyperlipidemia ( ICD-10:E78.5 ;Hyperlipidemia, unspecified   Hypertension ( ICD-10:I10 ;Essential (primary) hypertension   Iron deficiency anemia (disorder) ( ICD-10:D50.9 ;Iron deficiency anemia, unspecified   Leukopenia ( ICD-10:D72.819 ;Decreased white blood cell count, unspecified   Obesity (  "ICD-10:E66.9 ;Obesity, unspecified   Proteinuria ( ICD-10:R80.9 ;Proteinuria, unspecified    SURGERIES:   Colonoscopic polypectomy: Repeat colonoscopy, 03/04/2019. \"3 polyps.\" Repeat 3 years   Coronary artery bypass grafting, (CABG)   Shoulder repair, left rotator cuff, 2015   Colonoscopic polypectomy: Colonoscopy, 03/27/2018 (Dr. Hahn). Impressions: 1) Diverticulosis. 2) Multiple colon polyps. 3) Status post colon polypectomies by cold snare cold biopsy. 4) Status post mucosal resection of colon polyps. 5) Internal hemorrhoids. Recommendations: 1) Repeat colonoscopy 1 year. 2) Continue the omeprazole OTC 20 mg daily which is controlling this patient's reflux symptoms. 3) No source of this patient's anemia was identified on the EGD or colonoscopy. Pathology: Glandular adenomatous polyps. No malignancy   Replacement of aortic valve, 2015   EGD, 03/27/2018 (Dr. Hahn): Impressions: 1) Hiatal hernia. 2) Distal esophageal stricture. 3) Status post esophageal dilation using Savary dilators over an endoscopically placed guidewire. Path: H pylori negative   Cardiac catheterization with stent placement      ADULT ILLNESSES:  Patient Active Problem List   Diagnosis Code   • Stage 3 chronic kidney disease (CMS/[facility]) N18.30   • Anemia in stage 3 chronic kidney disease (CMS/HCC) N18.30, D63.1   • MGUS (monoclonal gammopathy of unknown significance) D47.2   • Aortic stenosis I35.0   • CAD (coronary artery disease) I25.10   • Chronic diastolic CHF (congestive heart failure) (CMS/[facility]) I50.32   • Diabetes mellitus (CMS/[facility]) E11.9   • Family history of early CAD Z82.49   • History of coronary artery stent placement Z95.5   • Hyperlipemia E78.5   • Hypertension I10   • Jaw pain R68.84   • Obesity E66.9   • 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   • BARBER (dyspnea on exertion) R06.00   • SOB (shortness of breath) R06.02   • Valvular heart disease I38   • Bilateral carpal tunnel syndrome G56.03   • Chronic bilateral low back " "pain with right-sided sciatica M54.41, G89.29   • Greater trochanteric bursitis of right hip M70.61     SURGERIES:  Past Surgical History:   Procedure Laterality Date   • CARDIAC SURGERY       HEALTH MAINTENANCE ITEMS:  Health Maintenance Due   Topic Date Due   • URINE MICROALBUMIN  Never done   • ZOSTER VACCINE (1 of 2) Never done   • Pneumococcal Vaccine 65+ (1 of 1 - PPSV23) Never done   • HEPATITIS C SCREENING  Never done   • DIABETIC FOOT EXAM  Never done   • HEMOGLOBIN A1C  Never done   • DIABETIC EYE EXAM  Never done   • LIPID PANEL  03/12/2020   • ANNUAL WELLNESS VISIT  01/31/2021       <no information>  Last Completed Colonoscopy     This patient has no relevant Health Maintenance data.          There is no immunization history on file for this patient.  Last Completed Mammogram     This patient has no relevant Health Maintenance data.            FAMILY HISTORY:  No family history on file.  SOCIAL HISTORY:  Social History     Socioeconomic History   • Marital status:      Spouse name: Not on file   • Number of children: Not on file   • Years of education: Not on file   • Highest education level: Not on file   Tobacco Use   • Smoking status: Former Smoker   • Smokeless tobacco: Never Used       REVIEW OF SYSTEMS:  Constitutional:   The patient's appetite is good \"good,\" but energy is fair to low. He manages his ADLs including the chores, running errands, and driving. His activities are only somwhat impeded by lower back and hip pains. Says he was previously seen by ortho and has had scans showing \"bulging disks\". He has regained 2 pounds (had lost 10 pounds at his 2 prior visits) since his last visit. He has no fevers, chills, or drenching night sweats. His sleep habits seem appropriate.  Ear/Nose/Throat/Mouth:   He is hard of hearing. He reports no ear pains or sinus symptoms. He has no sore throat, nosebleeds, or sore tongue. He has no headaches. He denies any hoarseness.   Ocular:   He reports no " "eye pain, significant change in visual acuity, double vision, or blurry vision. \"Nope.\"  Respiratory:   He reports baseline exertional dyspnea and is sometimes short of breath with his routine activities. \"Same.\" He has no chronic cough, significant shortness of breathing at rest, phlegm production, or unexplained chest wall pain.  Cardiovascular:   He reports no exertional chest pain, chest pressure, or chest heaviness. He reports no claudication. He reports no palpitations or symptomatic orthostasis.  Gastrointestinal:   He reports no dysphagia, nausea, vomiting, postprandial abdominal pain, bloating, cramping, change in bowel habits, or discoloration of the stool. He reports no rectal bleeding. He reports no constipation or diarrhea. Last EGD and colonoscopy by Dr. Hahn, 03/27/2018 (above). Had repeat last 03/04/2019. \"3 polyps.\" Repeat 3 years.  Genitourinary:   He reports no urinary burning, frequency, dribbling, or discoloration. He reports no difficulty controlling his bladder. He has no need to urinate frequently through the night.   Musculoskeletal:   He reports chronic arthralgias, especially the hips and lower back. He has no myalgias or nighttime leg cramping. Says he no longer gets pain shots to his back by pain management, most recently sometime 09/2018. Not since due to lack of efficacy.  Says surgery previously discussed. \"Some say it'll help, but others say no.\"    Extremities:   He reports trouble with fluid retention and leg swelling.  Endocrine:   He reports no problems with excess thirst, excessive urination, vasomotor instability, or unexplained fatigue.  Heme/Lymphatic:   He reports no unexplained bleeding, bruising, petechial rashes, or swollen glands.  Skin:   He reports no itching, rashes, or lesions which won't heal.  Neuro:   He reports bouts of postural dizziness but no loss of consciousness, seizures, fainting spells. He reports no weakness of face, arms, or legs but has bout of right " hip/buttock radiculitis. He has no difficulty with speech. He has no tremors. Has paresthesias of the left middle finger, but nowhere else  Psych:   He seems generally satisfied with life. He denies depression. He reports no mood swings.    206 lb    VITAL SIGNS: There were no vitals taken for this visit.There is no height or weight on file to calculate BSA.  There were no vitals filed for this visit.      PHYSICAL EXAMINATION:   General:   He is a very-pleasant, obese, modestly-kept elderly male who is comfortable at rest. He arrived in the exam room ambulatory. He appears to be his stated age. His skin color is normal.   Head/Neck:   The patient is anicteric and atraumatic. He is wearing a surgical mask today.  The trachea is midline. The neck is supple without evidence of jugular venous distention or cervical adenopathy. The mobile epidermoid cyst (previously less than 1 cm) at the base of his neck on the right has flattened after it auto drained last 02/2019 - again seems to be coming up in a small 1 cm mound.  Eyes:   The pupils are equal, round, and reactive to light. The extraocular movements are full. There is no scleral jaundice or erythema.   Chest:   The respiratory efforts are normal and unhindered. The chest is clear to auscultation and percussion. There are no wheezes, rhonchi, rales, or asymmetry of breath sounds.  Cardiovascular:   The patient has a regular cardiac rate and rhythm with an aortic murmur at the bases but heard throughout. No rubs or gallops. The peripheral pulses are equal and full.  Abdomen:   The belly is morbidly obese with a large panniculus precluding an adequate exam. There is no rebound or guarding. There is no organomegaly, mass-effect, or tenderness. Bowel sounds are active and of normal character.  Extremities:   There is no evidence of cyanosis, clubbing, with symmetrical 1+ (same) ankle edema.  Rheumatologic:   There is no overt evidence of rheumatoid deformities of the  hands. There is no sausaging of the fingers. There is no sign of active synovitis. The gait is slow and a bit stooped but independent.  Cutaneous:   There are no overt rashes, disseminated lesions, purpura, or petechiae.   Lymphatics:   There is no evidence of adenopathy in the cervical, supraclavicular, axillary, inguinal, or femoral areas.   Neurologic:   The patient is alert, oriented, cooperative, and pleasant. He is appropriately conversant. He ambulated into the exam room without assistance and transferred from chair to exam table unaided. There is no overt dysfunction of the motor, sensory, cerebellar systems.  Psych:   Mood and affect are appropriate for circumstance. Eye contact is appropriate. Normal judgement and decision making.         LABS    Lab Results - Last 18 Months   Lab Units 05/20/21  1020 01/21/21  0901 11/19/20  1306 09/24/20  0857 07/30/20  0849 05/14/20  0846   HEMOGLOBIN g/dL 12.9* 13.7 12.7* 12.9* 13.5 14.0   HEMATOCRIT % 40.8 43.0 39.5 39.9 41.9 43.9   MCV fL 95.3 94.5 95.4 94.8 95.0 95.0   WBC 10*3/mm3 5.47 6.63 5.78 5.58 6.66 6.79   RDW % 13.1 12.9 13.2 13.2 12.9 13.3   MPV fL 9.6 10.3 10.1 9.6 9.9 9.7   PLATELETS 10*3/mm3 155 184 180 165 194 200   IMM GRAN % % 0.2 0.2 0.2 0.2 0.9* 0.1   NEUTROS ABS 10*3/mm3 4.08 4.69 4.15 3.73 4.30 5.07   LYMPHS ABS 10*3/mm3 0.87 1.20 1.01 1.07 1.43 1.02   MONOS ABS 10*3/mm3 0.41 0.55 0.43 0.54 0.60 0.47   EOS ABS 10*3/mm3 0.07 0.15 0.15 0.20 0.23 0.18   BASOS ABS 10*3/mm3 0.03 0.03 0.03 0.03 0.04 0.04   IMMATURE GRANS (ABS) 10*3/mm3 0.01 0.01 0.01 0.01 0.06* 0.01       Lab Results - Last 18 Months   Lab Units 05/20/21  1020 01/21/21  0901 11/19/20  1306 07/30/20  0849 05/14/20  0846 12/09/19  0850   GLUCOSE mg/dL 177* 168* 193* 141* 134* 202*   SODIUM mmol/L 139 138 138 140 140 142   POTASSIUM mmol/L 4.2 4.2 4.3 4.6 4.3 3.8   CO2 mmol/L 27.0 26.0 25.0 26.0 25.0 28.0   CHLORIDE mmol/L 104 101 103 99 101 99   ANION GAP mmol/L 8.0 11.0 10.0 15.0 14.0  15.0   CREATININE mg/dL 1.68* 1.87* 1.80* 1.90* 2.05* 2.02*   BUN mg/dL 34* 37* 37* 36* 40* 39*   BUN / CREAT RATIO  20.2 19.8 20.6 18.9 19.5 19.3   CALCIUM mg/dL 9.2 9.4 9.1 9.4 9.5 9.4   EGFR IF NONAFRICN AM mL/min/1.73 40* 35* 37* 35* 32* 33*   ALK PHOS U/L 105 117 116 108 108 111   TOTAL PROTEIN g/dL 6.7 7.0 6.6 6.9 7.2 7.2   ALT (SGPT) U/L 29 28 29 33 24 28   AST (SGOT) U/L 19 19 21 21 17 18   BILIRUBIN mg/dL 0.4 0.5 0.4 0.4 0.4 0.3   ALBUMIN g/dL 4.30 4.20 4.10  3.4 4.40 4.50  3.6 4.50  3.7   GLOBULIN gm/dL 2.4 2.8 2.5 2.5 2.7 2.7       Lab Results - Last 18 Months   Lab Units 11/19/20  1306 05/14/20  0846 12/09/19  0850   M-SPIKE g/dL 0.2* 0.2* 0.2*   KAPPA/LAMBDA RATIO, S  2.34* 2.04* 2.02*   FREE LAMBDA LIGHT CHAINS mg/L 16.8 17.1 17.2   IG KAPPA FREE LIGHT CHAIN mg/L 39.3* 34.9* 34.7*       Lab Results - Last 18 Months   Lab Units 05/20/21  1020 01/21/21  0901 11/19/20  1306 06/09/20  1129 05/21/20  1521 05/14/20  0846 12/09/19  0850   IRON mcg/dL  --  82 56*  --   --  83 61   TIBC mcg/dL  --  361 332  --   --  349 365   IRON SATURATION %  --  23 17*  --   --  24 17*   FERRITIN ng/mL 492.60* 417.70* 410.30*  --   --  389.90 445.60*   FOLATE ng/mL 8.61  --  5.97 >20.00 9.87 3.23* 5.47       ASSESSMENT:   1. Normocytic anemia. Contribution from chronic kidney disease.   --Hgb, 12.9; MCV 95.3, 05/20/2021 (prior: Hgb 8.6 - 13; MCV 84 - 97.2).   2. Chronic kidney disease, Stage III-IV.   --Stable, GFR 40 mL/min on 05/20/2021 (prior range: 24.8 - 37 mL/min). Followed by Dr. Queen   3. IgG lambda monoclonal gammopathy of undetermined significance (MGUS):   a. No associated hypercalcemia.   b. Monoclonal IgG less than 3 gm -571, 05/20/2021 (prior range: 503 - 664)/ M-spike 0.2 (prior: 0.2 - 0.3).   c. Stable renal dysfunction.   d. Equivocal right humerus lytic bone lesions.   e. Negative UPIEP for Bence-Tyler proteins.   f. Marrow biopsy, 12/01/2017 without monoclonal plasma cells.   g. Kappa/lambda ratio,  "2.56, 05/20/2021 (prior range: 2.02 - 2.34).  4. Proteinuria, NOS. Prior urine protein immunoelectrophoresis (UPIEP) with no monoclonality.   5. History of coronary artery disease.   6. Chronic congestive heart failure (CHF).   7. Aortic valve replacement.   8. Coronary artery bypass grafting (CABG).   9. Heart catheterization with stent placement.   10. Diabetes mellitus. Followed by Dr. Davis  11. Obesity. Unchanged  12. Chronic fatigue, contributions from all the above.   13. 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 Office encounter from ortho - Dr. Newman, 01/29/2018. Patient left Dr. Newman's office without being seen. No explanation given.   14. Bone survey with sclerotic lesion of the left humerus, however, CT upper right extremity, 01/24/2018: No worrisome bony lesions are seen in the RIGHT humerus. The cortical defect in the proximal humerus is consistent with a previous biceps tenodesis. Moderate degenerative changes in the acromioclavicular and glenohumeral joints. This is in comparison to bone survey 11/22/2017.   15. Multiple colon polyps. Repeat colonoscopy, 03/04/2019. \"3 polyps.\" Repeat 3 years.    RECOMMENDATIONS:   1.   Re: Apprise of labs from 05/20/2021 with stable anemia and otherwise normal CBC, low (stable) GFR, hyperglycemia, normal alk phos and otherwise normal CMP, pending serum iron, pending Fe sat (previously 17%), repleted ferritin (greater than 400), SPIEP stable - previously stable IgG lambda monoclonal immunoglobulin (529; from 607; from 631; from 550; from 579; from 664; from 595). M-spike 0.2 (range: 0.2 -0.3). Stable serum light chain kappa/lambda ratio, low B12 (Folbee called in) and folate.    2.  Previously discussed the EGD and colonoscopy findings from 03/27/2018 (above) are noted.   3.  Stop ferrous sulfate   4.  Previously discussed labs from 11/14/2017 and 11/16/2017 with negative UPIEP, but slightly elevated kappa/lambda light chain ratio. " Discussed the bone marrow biopsy from 12/01/2017. Normocellular with maturing trilineage hematopoiesis. No atypical lymphoid or plasmacytoid aggregates. No evidence of metastatic malignancy or granulomatous disease. No increased blasts. Decreased stainable iron.   5.  MGUS again discussed. I had noted that MGUS occurs in 1-3% of the general population. Four groups of patients with MGUS can be identified: 1) Those still alive with monoclonal gammopathy (19%); 2) Those whose serum immunoglobulin level increased to more than 3g/dL but who do not require any therapy (10%); 3) Patients who die without developing multiple myeloma or other related disease (47%); and 4) Patients who develop myeloma amyloidosis macroglobulinemia or another lymphoproliferative disorder (24%, 2/3 of these disorders from myeloma), which occurred at the median of 8 - 10 years.   6.  Previously discussed the rationale and potential toxicities (including the risk for increased mortality from stroke, myocardial infarction (MI), congestive heart failure (CHF), seizures, sepsis, allergic reactions) for HAROON support. Questions answered.   7.  CBC every 8 weeks with Procrit 40,000 units subcutaneously if Hgb less 11 and less than 33 - BHP.   8.  Continue other currently identified medications.   9.  Continue ongoing management per primary care physician and other specialists.   10. Return to the [place] office in 24 weeks with pre-office SPIEP, kappa/lambda ratio, CMP, serum iron, Fe sat, ferritin, B12, folate, CBC and differential.     MEDICAL DECISION MAKING: Moderate Complexity   AMOUNT OF DATA: Moderate     I spent - 33 total minutes, face-to-face, caring for Nicholas huertas.  Greater than 50% of this time involved counseling and/or coordination of care as documented within this note regarding the patient's illness(es), pros and cons of various treatment options, instructions and/or risk reduction.    cc: MD Stuart Marte MD-Edenilson          Nicolas Queen MD

## 2021-05-24 LAB
ALBUMIN SERPL ELPH-MCNC: 3.7 G/DL (ref 2.9–4.4)
ALBUMIN/GLOB SERPL: 1.5 {RATIO} (ref 0.7–1.7)
ALPHA1 GLOB SERPL ELPH-MCNC: 0.2 G/DL (ref 0–0.4)
ALPHA2 GLOB SERPL ELPH-MCNC: 0.8 G/DL (ref 0.4–1)
B-GLOBULIN SERPL ELPH-MCNC: 1.1 G/DL (ref 0.7–1.3)
GAMMA GLOB SERPL ELPH-MCNC: 0.6 G/DL (ref 0.4–1.8)
GLOBULIN SER-MCNC: 2.6 G/DL (ref 2.2–3.9)
IGA SERPL-MCNC: 153 MG/DL (ref 61–437)
IGG SERPL-MCNC: 571 MG/DL (ref 603–1613)
IGM SERPL-MCNC: 34 MG/DL (ref 15–143)
INTERPRETATION SERPL IEP-IMP: ABNORMAL
KAPPA LC FREE SER-MCNC: 35.9 MG/L (ref 3.3–19.4)
KAPPA LC FREE/LAMBDA FREE SER: 2.56 {RATIO} (ref 0.26–1.65)
LABORATORY COMMENT REPORT: ABNORMAL
LAMBDA LC FREE SERPL-MCNC: 14 MG/L (ref 5.7–26.3)
M PROTEIN SERPL ELPH-MCNC: 0.2 G/DL
PROT SERPL-MCNC: 6.3 G/DL (ref 6–8.5)

## 2021-05-26 ENCOUNTER — OFFICE VISIT (OUTPATIENT)
Dept: ONCOLOGY | Facility: CLINIC | Age: 76
End: 2021-05-26

## 2021-05-26 VITALS
SYSTOLIC BLOOD PRESSURE: 140 MMHG | TEMPERATURE: 97 F | RESPIRATION RATE: 16 BRPM | BODY MASS INDEX: 41.57 KG/M2 | HEIGHT: 70 IN | HEART RATE: 61 BPM | DIASTOLIC BLOOD PRESSURE: 82 MMHG | WEIGHT: 290.4 LBS | OXYGEN SATURATION: 97 %

## 2021-05-26 DIAGNOSIS — D47.2 MGUS (MONOCLONAL GAMMOPATHY OF UNKNOWN SIGNIFICANCE): Primary | ICD-10-CM

## 2021-05-26 DIAGNOSIS — R71.8 OTHER ABNORMALITY OF RED BLOOD CELLS: ICD-10-CM

## 2021-05-26 PROCEDURE — 99214 OFFICE O/P EST MOD 30 MIN: CPT | Performed by: INTERNAL MEDICINE

## 2021-05-26 RX ORDER — FERROUS SULFATE 325(65) MG
325 TABLET ORAL
Qty: 30 TABLET | Refills: 2 | Status: CANCELLED | OUTPATIENT
Start: 2021-05-26

## 2021-06-18 RX ORDER — CYANOCOBALAMIN/FOLIC AC/VIT B6 1-2.5-25MG
TABLET ORAL
Qty: 30 TABLET | Refills: 0 | Status: SHIPPED | OUTPATIENT
Start: 2021-06-18 | End: 2021-08-02 | Stop reason: SDUPTHER

## 2021-08-02 RX ORDER — CYANOCOBALAMIN/FOLIC AC/VIT B6 1-2.5-25MG
1 TABLET ORAL DAILY
Qty: 30 TABLET | Refills: 5 | Status: SHIPPED | OUTPATIENT
Start: 2021-08-02

## 2021-08-26 ENCOUNTER — CLINICAL SUPPORT (OUTPATIENT)
Dept: ONCOLOGY | Facility: CLINIC | Age: 76
End: 2021-08-26

## 2021-08-26 ENCOUNTER — LAB (OUTPATIENT)
Dept: ONCOLOGY | Facility: CLINIC | Age: 76
End: 2021-08-26

## 2021-08-26 VITALS
HEART RATE: 52 BPM | SYSTOLIC BLOOD PRESSURE: 132 MMHG | RESPIRATION RATE: 16 BRPM | OXYGEN SATURATION: 96 % | DIASTOLIC BLOOD PRESSURE: 74 MMHG | TEMPERATURE: 97.8 F

## 2021-08-26 DIAGNOSIS — N18.32 STAGE 3B CHRONIC KIDNEY DISEASE (HCC): ICD-10-CM

## 2021-08-26 DIAGNOSIS — N18.32 ANEMIA IN STAGE 3B CHRONIC KIDNEY DISEASE (HCC): ICD-10-CM

## 2021-08-26 DIAGNOSIS — D63.1 ANEMIA IN STAGE 3B CHRONIC KIDNEY DISEASE (HCC): ICD-10-CM

## 2021-08-26 DIAGNOSIS — D47.2 MGUS (MONOCLONAL GAMMOPATHY OF UNKNOWN SIGNIFICANCE): ICD-10-CM

## 2021-08-26 PROCEDURE — 85025 COMPLETE CBC W/AUTO DIFF WBC: CPT | Performed by: INTERNAL MEDICINE

## 2021-08-26 PROCEDURE — 99211 OFF/OP EST MAY X REQ PHY/QHP: CPT | Performed by: NURSE PRACTITIONER

## 2021-08-26 NOTE — PROGRESS NOTES
Patient here for lab evaluation to initiate Retacrit for anemia due to stage IIIB chronic kidney disease.  States that he is feeling good today.  No c/o voiced.  Skin w/d to touch.  Color wnl.  Eating and drinking well.  Parameters set to initiate Retacrit 40,000 units for Hgb <10 and Hct <30.  Reviewed labs with patient, Hgb 12.9 and Hct 41.1 today.  Patient does not meet parameters to initiate Retacrit at this time. Patient states that his labs have been stable for over a year now and would like to stop getting labs checked between MD appointments.  Dr Swenson is seeing him every 6 months and just wants them checked at that time.  Instructed to call with any problems.  Patient v/u.

## 2021-08-31 LAB
BASOPHILS # BLD AUTO: 0.03 10*3/MM3 (ref 0–0.2)
BASOPHILS NFR BLD AUTO: 0.5 % (ref 0–1.5)
DEPRECATED RDW RBC AUTO: 46.1 FL (ref 37–54)
EOSINOPHIL # BLD AUTO: 0.16 10*3/MM3 (ref 0–0.4)
EOSINOPHIL NFR BLD AUTO: 2.9 % (ref 0.3–6.2)
ERYTHROCYTE [DISTWIDTH] IN BLOOD BY AUTOMATED COUNT: 12.9 % (ref 12.3–15.4)
HCT VFR BLD AUTO: 41.1 % (ref 37.5–51)
HGB BLD-MCNC: 12.9 G/DL (ref 13–17.7)
IMM GRANULOCYTES # BLD AUTO: 0 10*3/MM3 (ref 0–0.05)
IMM GRANULOCYTES NFR BLD AUTO: 0 % (ref 0–0.5)
LYMPHOCYTES # BLD AUTO: 1.19 10*3/MM3 (ref 0.7–3.1)
LYMPHOCYTES NFR BLD AUTO: 21.4 % (ref 19.6–45.3)
MCH RBC QN AUTO: 30 PG (ref 26.6–33)
MCHC RBC AUTO-ENTMCNC: 31.4 G/DL (ref 31.5–35.7)
MCV RBC AUTO: 95.6 FL (ref 79–97)
MONOCYTES # BLD AUTO: 0.58 10*3/MM3 (ref 0.1–0.9)
MONOCYTES NFR BLD AUTO: 10.5 % (ref 5–12)
NEUTROPHILS NFR BLD AUTO: 3.59 10*3/MM3 (ref 1.7–7)
NEUTROPHILS NFR BLD AUTO: 64.7 % (ref 42.7–76)
PLATELET # BLD AUTO: 164 10*3/MM3 (ref 140–450)
PMV BLD AUTO: 9.6 FL (ref 6–12)
RBC # BLD AUTO: 4.3 10*6/MM3 (ref 4.14–5.8)
WBC # BLD AUTO: 5.55 10*3/MM3 (ref 3.4–10.8)

## 2022-03-12 ENCOUNTER — APPOINTMENT (OUTPATIENT)
Dept: CT IMAGING | Age: 77
DRG: 287 | End: 2022-03-12
Payer: MEDICARE

## 2022-03-12 ENCOUNTER — HOSPITAL ENCOUNTER (INPATIENT)
Age: 77
LOS: 2 days | Discharge: HOME | DRG: 287 | End: 2022-03-14
Attending: EMERGENCY MEDICINE | Admitting: INTERNAL MEDICINE
Payer: MEDICARE

## 2022-03-12 ENCOUNTER — APPOINTMENT (OUTPATIENT)
Dept: GENERAL RADIOLOGY | Age: 77
DRG: 287 | End: 2022-03-12
Payer: MEDICARE

## 2022-03-12 DIAGNOSIS — R00.2 PALPITATIONS: ICD-10-CM

## 2022-03-12 DIAGNOSIS — R07.9 CHEST PAIN, UNSPECIFIED TYPE: Primary | ICD-10-CM

## 2022-03-12 PROBLEM — I42.8 TACHYCARDIA INDUCED CARDIOMYOPATHY (HCC): Status: ACTIVE | Noted: 2022-03-12

## 2022-03-12 PROBLEM — I43 TACHYCARDIA INDUCED CARDIOMYOPATHY (HCC): Status: ACTIVE | Noted: 2022-03-12

## 2022-03-12 PROBLEM — R00.0 TACHYCARDIA INDUCED CARDIOMYOPATHY (HCC): Status: ACTIVE | Noted: 2022-03-12

## 2022-03-12 LAB
ALBUMIN SERPL-MCNC: 4.2 G/DL (ref 3.5–5.2)
ALP BLD-CCNC: 110 U/L (ref 40–130)
ALT SERPL-CCNC: 33 U/L (ref 5–41)
ANION GAP SERPL CALCULATED.3IONS-SCNC: 11 MMOL/L (ref 7–19)
AST SERPL-CCNC: 23 U/L (ref 5–40)
BASOPHILS ABSOLUTE: 0 K/UL (ref 0–0.2)
BASOPHILS RELATIVE PERCENT: 0.7 % (ref 0–1)
BILIRUB SERPL-MCNC: 0.5 MG/DL (ref 0.2–1.2)
BUN BLDV-MCNC: 23 MG/DL (ref 8–23)
CALCIUM SERPL-MCNC: 9.5 MG/DL (ref 8.8–10.2)
CHLORIDE BLD-SCNC: 103 MMOL/L (ref 98–111)
CO2: 28 MMOL/L (ref 22–29)
CREAT SERPL-MCNC: 1.4 MG/DL (ref 0.5–1.2)
D DIMER: 0.81 UG/ML FEU (ref 0–0.48)
EOSINOPHILS ABSOLUTE: 0.2 K/UL (ref 0–0.6)
EOSINOPHILS RELATIVE PERCENT: 2.5 % (ref 0–5)
GFR AFRICAN AMERICAN: >59
GFR NON-AFRICAN AMERICAN: 49
GLUCOSE BLD-MCNC: 102 MG/DL (ref 74–109)
HCT VFR BLD CALC: 41.9 % (ref 42–52)
HEMOGLOBIN: 13.4 G/DL (ref 14–18)
IMMATURE GRANULOCYTES #: 0 K/UL
INR BLD: 0.89 (ref 0.88–1.18)
LYMPHOCYTES ABSOLUTE: 1.4 K/UL (ref 1.1–4.5)
LYMPHOCYTES RELATIVE PERCENT: 23.1 % (ref 20–40)
MCH RBC QN AUTO: 30.9 PG (ref 27–31)
MCHC RBC AUTO-ENTMCNC: 32 G/DL (ref 33–37)
MCV RBC AUTO: 96.8 FL (ref 80–94)
MONOCYTES ABSOLUTE: 0.6 K/UL (ref 0–0.9)
MONOCYTES RELATIVE PERCENT: 10 % (ref 0–10)
NEUTROPHILS ABSOLUTE: 3.8 K/UL (ref 1.5–7.5)
NEUTROPHILS RELATIVE PERCENT: 63.5 % (ref 50–65)
PDW BLD-RTO: 13.6 % (ref 11.5–14.5)
PLATELET # BLD: 166 K/UL (ref 130–400)
PMV BLD AUTO: 10.7 FL (ref 9.4–12.4)
POTASSIUM SERPL-SCNC: 4.2 MMOL/L (ref 3.5–5)
PRO-BNP: 1073 PG/ML (ref 0–1800)
PROTHROMBIN TIME: 12.3 SEC (ref 12–14.6)
RBC # BLD: 4.33 M/UL (ref 4.7–6.1)
SARS-COV-2, NAAT: NOT DETECTED
SODIUM BLD-SCNC: 142 MMOL/L (ref 136–145)
TOTAL PROTEIN: 6.9 G/DL (ref 6.6–8.7)
TROPONIN: 0.02 NG/ML (ref 0–0.03)
TROPONIN: <0.01 NG/ML (ref 0–0.03)
WBC # BLD: 6 K/UL (ref 4.8–10.8)

## 2022-03-12 PROCEDURE — 85610 PROTHROMBIN TIME: CPT

## 2022-03-12 PROCEDURE — 93005 ELECTROCARDIOGRAM TRACING: CPT | Performed by: EMERGENCY MEDICINE

## 2022-03-12 PROCEDURE — 83880 ASSAY OF NATRIURETIC PEPTIDE: CPT

## 2022-03-12 PROCEDURE — 85025 COMPLETE CBC W/AUTO DIFF WBC: CPT

## 2022-03-12 PROCEDURE — 2140000000 HC CCU INTERMEDIATE R&B

## 2022-03-12 PROCEDURE — 93005 ELECTROCARDIOGRAM TRACING: CPT | Performed by: INTERNAL MEDICINE

## 2022-03-12 PROCEDURE — G0378 HOSPITAL OBSERVATION PER HR: HCPCS

## 2022-03-12 PROCEDURE — 84484 ASSAY OF TROPONIN QUANT: CPT

## 2022-03-12 PROCEDURE — 85379 FIBRIN DEGRADATION QUANT: CPT

## 2022-03-12 PROCEDURE — 71275 CT ANGIOGRAPHY CHEST: CPT

## 2022-03-12 PROCEDURE — 6360000004 HC RX CONTRAST MEDICATION: Performed by: EMERGENCY MEDICINE

## 2022-03-12 PROCEDURE — 71045 X-RAY EXAM CHEST 1 VIEW: CPT

## 2022-03-12 PROCEDURE — 36415 COLL VENOUS BLD VENIPUNCTURE: CPT

## 2022-03-12 PROCEDURE — 87635 SARS-COV-2 COVID-19 AMP PRB: CPT

## 2022-03-12 PROCEDURE — 80053 COMPREHEN METABOLIC PANEL: CPT

## 2022-03-12 PROCEDURE — 99285 EMERGENCY DEPT VISIT HI MDM: CPT

## 2022-03-12 RX ORDER — ALLOPURINOL 300 MG/1
300 TABLET ORAL DAILY
Status: DISCONTINUED | OUTPATIENT
Start: 2022-03-13 | End: 2022-03-14 | Stop reason: HOSPADM

## 2022-03-12 RX ORDER — ONDANSETRON 2 MG/ML
4 INJECTION INTRAMUSCULAR; INTRAVENOUS EVERY 6 HOURS PRN
Status: DISCONTINUED | OUTPATIENT
Start: 2022-03-12 | End: 2022-03-14 | Stop reason: HOSPADM

## 2022-03-12 RX ORDER — ONDANSETRON 4 MG/1
4 TABLET, ORALLY DISINTEGRATING ORAL EVERY 8 HOURS PRN
Status: DISCONTINUED | OUTPATIENT
Start: 2022-03-12 | End: 2022-03-14 | Stop reason: HOSPADM

## 2022-03-12 RX ORDER — POLYETHYLENE GLYCOL 3350 17 G/17G
17 POWDER, FOR SOLUTION ORAL DAILY PRN
Status: DISCONTINUED | OUTPATIENT
Start: 2022-03-12 | End: 2022-03-14 | Stop reason: HOSPADM

## 2022-03-12 RX ORDER — SODIUM CHLORIDE 0.9 % (FLUSH) 0.9 %
5-40 SYRINGE (ML) INJECTION EVERY 12 HOURS SCHEDULED
Status: DISCONTINUED | OUTPATIENT
Start: 2022-03-13 | End: 2022-03-14 | Stop reason: HOSPADM

## 2022-03-12 RX ORDER — SODIUM CHLORIDE 9 MG/ML
25 INJECTION, SOLUTION INTRAVENOUS PRN
Status: DISCONTINUED | OUTPATIENT
Start: 2022-03-12 | End: 2022-03-14 | Stop reason: HOSPADM

## 2022-03-12 RX ORDER — ACETAMINOPHEN 325 MG/1
650 TABLET ORAL EVERY 6 HOURS PRN
Status: DISCONTINUED | OUTPATIENT
Start: 2022-03-12 | End: 2022-03-14 | Stop reason: HOSPADM

## 2022-03-12 RX ORDER — ASPIRIN 81 MG/1
81 TABLET, CHEWABLE ORAL DAILY
Status: DISCONTINUED | OUTPATIENT
Start: 2022-03-13 | End: 2022-03-14 | Stop reason: HOSPADM

## 2022-03-12 RX ORDER — 0.9 % SODIUM CHLORIDE 0.9 %
1000 INTRAVENOUS SOLUTION INTRAVENOUS ONCE
Status: DISCONTINUED | OUTPATIENT
Start: 2022-03-12 | End: 2022-03-12

## 2022-03-12 RX ORDER — GABAPENTIN 300 MG/1
300 CAPSULE ORAL NIGHTLY
Status: DISCONTINUED | OUTPATIENT
Start: 2022-03-12 | End: 2022-03-14 | Stop reason: HOSPADM

## 2022-03-12 RX ORDER — TAMSULOSIN HYDROCHLORIDE 0.4 MG/1
0.4 CAPSULE ORAL DAILY
Status: ON HOLD | COMMUNITY
End: 2022-04-13

## 2022-03-12 RX ORDER — ACETAMINOPHEN 650 MG/1
650 SUPPOSITORY RECTAL EVERY 6 HOURS PRN
Status: DISCONTINUED | OUTPATIENT
Start: 2022-03-12 | End: 2022-03-14 | Stop reason: HOSPADM

## 2022-03-12 RX ORDER — ATORVASTATIN CALCIUM 40 MG/1
40 TABLET, FILM COATED ORAL EVERY OTHER DAY
Status: DISCONTINUED | OUTPATIENT
Start: 2022-03-13 | End: 2022-03-14 | Stop reason: HOSPADM

## 2022-03-12 RX ORDER — SODIUM CHLORIDE 0.9 % (FLUSH) 0.9 %
5-40 SYRINGE (ML) INJECTION PRN
Status: DISCONTINUED | OUTPATIENT
Start: 2022-03-12 | End: 2022-03-14 | Stop reason: HOSPADM

## 2022-03-12 RX ADMIN — IOPAMIDOL 90 ML: 755 INJECTION, SOLUTION INTRAVENOUS at 21:11

## 2022-03-12 ASSESSMENT — ENCOUNTER SYMPTOMS
BACK PAIN: 0
ABDOMINAL PAIN: 0
NAUSEA: 0
DIARRHEA: 0
VOMITING: 0
SHORTNESS OF BREATH: 0
SORE THROAT: 0
RHINORRHEA: 0

## 2022-03-12 ASSESSMENT — PAIN SCALES - GENERAL: PAINLEVEL_OUTOF10: 4

## 2022-03-12 ASSESSMENT — PAIN DESCRIPTION - PAIN TYPE: TYPE: ACUTE PAIN

## 2022-03-12 ASSESSMENT — PAIN DESCRIPTION - FREQUENCY: FREQUENCY: INTERMITTENT

## 2022-03-12 ASSESSMENT — HEART SCORE: ECG: 0

## 2022-03-12 ASSESSMENT — PAIN - FUNCTIONAL ASSESSMENT: PAIN_FUNCTIONAL_ASSESSMENT: 0-10

## 2022-03-12 ASSESSMENT — PAIN DESCRIPTION - ORIENTATION: ORIENTATION: MID;UPPER

## 2022-03-12 ASSESSMENT — PAIN DESCRIPTION - LOCATION: LOCATION: CHEST

## 2022-03-13 PROBLEM — I48.0 PAROXYSMAL A-FIB (HCC): Status: ACTIVE | Noted: 2022-03-13

## 2022-03-13 LAB
ALBUMIN SERPL-MCNC: 3.9 G/DL (ref 3.5–5.2)
ALP BLD-CCNC: 95 U/L (ref 40–130)
ALT SERPL-CCNC: 28 U/L (ref 5–41)
ANION GAP SERPL CALCULATED.3IONS-SCNC: 11 MMOL/L (ref 7–19)
AST SERPL-CCNC: 20 U/L (ref 5–40)
BASOPHILS ABSOLUTE: 0 K/UL (ref 0–0.2)
BASOPHILS RELATIVE PERCENT: 0.5 % (ref 0–1)
BILIRUB SERPL-MCNC: 0.5 MG/DL (ref 0.2–1.2)
BILIRUBIN URINE: NEGATIVE
BLOOD, URINE: NEGATIVE
BUN BLDV-MCNC: 23 MG/DL (ref 8–23)
CALCIUM SERPL-MCNC: 9 MG/DL (ref 8.8–10.2)
CHLORIDE BLD-SCNC: 105 MMOL/L (ref 98–111)
CLARITY: CLEAR
CO2: 26 MMOL/L (ref 22–29)
COLOR: YELLOW
CREAT SERPL-MCNC: 1.4 MG/DL (ref 0.5–1.2)
EOSINOPHILS ABSOLUTE: 0.2 K/UL (ref 0–0.6)
EOSINOPHILS RELATIVE PERCENT: 2.6 % (ref 0–5)
GFR AFRICAN AMERICAN: >59
GFR NON-AFRICAN AMERICAN: 49
GLUCOSE BLD-MCNC: 138 MG/DL (ref 74–109)
GLUCOSE URINE: NEGATIVE MG/DL
HBA1C MFR BLD: 6.1 % (ref 4–6)
HCT VFR BLD CALC: 39.9 % (ref 42–52)
HEMOGLOBIN: 12.6 G/DL (ref 14–18)
IMMATURE GRANULOCYTES #: 0 K/UL
KETONES, URINE: NEGATIVE MG/DL
LEUKOCYTE ESTERASE, URINE: NEGATIVE
LV EF: 58 %
LVEF MODALITY: NORMAL
LYMPHOCYTES ABSOLUTE: 1.3 K/UL (ref 1.1–4.5)
LYMPHOCYTES RELATIVE PERCENT: 22.5 % (ref 20–40)
MCH RBC QN AUTO: 30.6 PG (ref 27–31)
MCHC RBC AUTO-ENTMCNC: 31.6 G/DL (ref 33–37)
MCV RBC AUTO: 96.8 FL (ref 80–94)
MONOCYTES ABSOLUTE: 0.4 K/UL (ref 0–0.9)
MONOCYTES RELATIVE PERCENT: 7.4 % (ref 0–10)
NEUTROPHILS ABSOLUTE: 3.9 K/UL (ref 1.5–7.5)
NEUTROPHILS RELATIVE PERCENT: 66.8 % (ref 50–65)
NITRITE, URINE: NEGATIVE
PDW BLD-RTO: 13.7 % (ref 11.5–14.5)
PH UA: 5.5 (ref 5–8)
PLATELET # BLD: 149 K/UL (ref 130–400)
PMV BLD AUTO: 10.2 FL (ref 9.4–12.4)
POTASSIUM REFLEX MAGNESIUM: 3.8 MMOL/L (ref 3.5–5)
PRO-BNP: 864 PG/ML (ref 0–1800)
PROTEIN UA: NEGATIVE MG/DL
RBC # BLD: 4.12 M/UL (ref 4.7–6.1)
SODIUM BLD-SCNC: 142 MMOL/L (ref 136–145)
SPECIFIC GRAVITY UA: 1.04 (ref 1–1.03)
T4 FREE: 1.2 NG/DL (ref 0.93–1.7)
TOTAL PROTEIN: 5.7 G/DL (ref 6.6–8.7)
TROPONIN: 0.01 NG/ML (ref 0–0.03)
TROPONIN: 0.01 NG/ML (ref 0–0.03)
TROPONIN: 0.02 NG/ML (ref 0–0.03)
TROPONIN: 0.03 NG/ML (ref 0–0.03)
TROPONIN: <0.01 NG/ML (ref 0–0.03)
TSH REFLEX FT4: 5.69 UIU/ML (ref 0.35–5.5)
UROBILINOGEN, URINE: 0.2 E.U./DL
WBC # BLD: 5.8 K/UL (ref 4.8–10.8)

## 2022-03-13 PROCEDURE — 36415 COLL VENOUS BLD VENIPUNCTURE: CPT

## 2022-03-13 PROCEDURE — 2140000000 HC CCU INTERMEDIATE R&B

## 2022-03-13 PROCEDURE — 96374 THER/PROPH/DIAG INJ IV PUSH: CPT

## 2022-03-13 PROCEDURE — 84439 ASSAY OF FREE THYROXINE: CPT

## 2022-03-13 PROCEDURE — G0378 HOSPITAL OBSERVATION PER HR: HCPCS

## 2022-03-13 PROCEDURE — 6370000000 HC RX 637 (ALT 250 FOR IP): Performed by: INTERNAL MEDICINE

## 2022-03-13 PROCEDURE — 84443 ASSAY THYROID STIM HORMONE: CPT

## 2022-03-13 PROCEDURE — 6360000004 HC RX CONTRAST MEDICATION: Performed by: INTERNAL MEDICINE

## 2022-03-13 PROCEDURE — 99222 1ST HOSP IP/OBS MODERATE 55: CPT | Performed by: INTERNAL MEDICINE

## 2022-03-13 PROCEDURE — 96372 THER/PROPH/DIAG INJ SC/IM: CPT

## 2022-03-13 PROCEDURE — 96376 TX/PRO/DX INJ SAME DRUG ADON: CPT

## 2022-03-13 PROCEDURE — 84484 ASSAY OF TROPONIN QUANT: CPT

## 2022-03-13 PROCEDURE — 2580000003 HC RX 258: Performed by: INTERNAL MEDICINE

## 2022-03-13 PROCEDURE — 81003 URINALYSIS AUTO W/O SCOPE: CPT

## 2022-03-13 PROCEDURE — 80053 COMPREHEN METABOLIC PANEL: CPT

## 2022-03-13 PROCEDURE — 93005 ELECTROCARDIOGRAM TRACING: CPT | Performed by: INTERNAL MEDICINE

## 2022-03-13 PROCEDURE — C8929 TTE W OR WO FOL WCON,DOPPLER: HCPCS

## 2022-03-13 PROCEDURE — 2500000003 HC RX 250 WO HCPCS: Performed by: INTERNAL MEDICINE

## 2022-03-13 PROCEDURE — 83036 HEMOGLOBIN GLYCOSYLATED A1C: CPT

## 2022-03-13 PROCEDURE — 6360000002 HC RX W HCPCS: Performed by: INTERNAL MEDICINE

## 2022-03-13 PROCEDURE — 83880 ASSAY OF NATRIURETIC PEPTIDE: CPT

## 2022-03-13 PROCEDURE — 85025 COMPLETE CBC W/AUTO DIFF WBC: CPT

## 2022-03-13 RX ORDER — LEVOTHYROXINE SODIUM 0.03 MG/1
25 TABLET ORAL DAILY
Status: DISCONTINUED | OUTPATIENT
Start: 2022-03-13 | End: 2022-03-14 | Stop reason: HOSPADM

## 2022-03-13 RX ADMIN — FAMOTIDINE 20 MG: 10 INJECTION, SOLUTION INTRAVENOUS at 00:43

## 2022-03-13 RX ADMIN — LEVOTHYROXINE SODIUM 25 MCG: 25 TABLET ORAL at 09:12

## 2022-03-13 RX ADMIN — SODIUM CHLORIDE, PRESERVATIVE FREE 10 ML: 5 INJECTION INTRAVENOUS at 21:31

## 2022-03-13 RX ADMIN — FAMOTIDINE 20 MG: 10 INJECTION, SOLUTION INTRAVENOUS at 21:31

## 2022-03-13 RX ADMIN — ENOXAPARIN SODIUM 120 MG: 120 INJECTION SUBCUTANEOUS at 00:44

## 2022-03-13 RX ADMIN — SODIUM CHLORIDE, PRESERVATIVE FREE 10 ML: 5 INJECTION INTRAVENOUS at 00:44

## 2022-03-13 RX ADMIN — SODIUM CHLORIDE, PRESERVATIVE FREE 10 ML: 5 INJECTION INTRAVENOUS at 09:12

## 2022-03-13 RX ADMIN — ASPIRIN 81 MG 81 MG: 81 TABLET ORAL at 09:19

## 2022-03-13 RX ADMIN — PERFLUTREN 1.5 ML: 6.52 INJECTION, SUSPENSION INTRAVENOUS at 12:46

## 2022-03-13 RX ADMIN — GABAPENTIN 300 MG: 300 CAPSULE ORAL at 21:31

## 2022-03-13 RX ADMIN — ALLOPURINOL 300 MG: 300 TABLET ORAL at 09:19

## 2022-03-13 RX ADMIN — ATORVASTATIN CALCIUM 40 MG: 40 TABLET, FILM COATED ORAL at 09:18

## 2022-03-13 RX ADMIN — ENOXAPARIN SODIUM 120 MG: 120 INJECTION SUBCUTANEOUS at 09:18

## 2022-03-13 RX ADMIN — GABAPENTIN 300 MG: 300 CAPSULE ORAL at 00:43

## 2022-03-13 RX ADMIN — FAMOTIDINE 20 MG: 10 INJECTION, SOLUTION INTRAVENOUS at 09:12

## 2022-03-13 ASSESSMENT — ENCOUNTER SYMPTOMS
NAUSEA: 1
NAUSEA: 0
VOMITING: 0
DIARRHEA: 0
CONSTIPATION: 0
BLOOD IN STOOL: 0
COUGH: 0
ABDOMINAL PAIN: 0
WHEEZING: 0
ALLERGIC/IMMUNOLOGIC NEGATIVE: 1
SHORTNESS OF BREATH: 0
ABDOMINAL DISTENTION: 0
RESPIRATORY NEGATIVE: 1
EYES NEGATIVE: 1
COLOR CHANGE: 0

## 2022-03-13 ASSESSMENT — PAIN SCALES - GENERAL: PAINLEVEL_OUTOF10: 0

## 2022-03-13 NOTE — PROGRESS NOTES
[facility]      Progress Note    Patient:  Amanda Archuleta  YOB: 1945  Date of Service: 3/13/2022  MRN: [id number]   Acct: [de-identified]   Primary Care Physician: Shelley Shine MD  Advance Directive: Full Code  Admit Date: 3/12/2022       Hospital Day: 1    Portions of this note have been copied forward, however, updated to reflect the most current clinical status of this patient. CHIEF COMPLAINT Tachycardia     SUBJECTIVE:  Mr. Freya Murphy was resting in bed this morning. Denied SOB or chest pain at this time. CUMULATIVE HOSPITAL COURSE:   The patient is a 77-year-old male with past medical history of CAD, Aortic stenosis s/p aortic valve replacement, CKD4, DM, HTN, HLD, and GERD who presented to [address] [place] [address] ED with complaints of tachycardia. Mr. Freya Murphy reported having tachycardia, hypertension, chest pain radiating to jaw while watching TV. Prior to admission. Reported chest discomfort. Stated his normal heart rate is in the mid 40s, however his heart rate was as high as 140s at home. Denied prior history of Afib, irregular rhythm, or tachycardia. Reported aortic valve replacement in 2015. Reported seeing Dr. Catalina Fallon outpatient, however has not seem in a cardiologist in a while. Patient was admitted to hospital medicine for tachycardia with cardiology consultation. Patient was noted to in [place] 45, held home Metoprolol. Review of Systems   Constitutional: Negative for chills, diaphoresis, fatigue and fever. HENT: Negative for congestion and ear pain. Eyes: Negative for visual disturbance. Respiratory: Negative for cough, shortness of breath and wheezing. Denies SOB   Cardiovascular: Negative for chest pain, palpitations and leg swelling. Denies chest pain    Gastrointestinal: Negative for abdominal distention, abdominal pain, blood in stool, constipation, diarrhea, nausea and vomiting. Endocrine: Negative for cold intolerance and heat intolerance. Genitourinary: Negative for difficulty urinating, flank pain, frequency and urgency. Musculoskeletal: Negative for arthralgias and myalgias. Skin: Negative for color change and wound. Neurological: Negative for dizziness, syncope, weakness, light-headedness, numbness and headaches. Hematological: Does not bruise/bleed easily. Psychiatric/Behavioral: Negative for agitation, confusion and dysphoric mood. Objective:   VITALS:  /65   Pulse (!) 47 Comment: sinus hamzah. pt stated this is normal rate for him  Temp 97 °F (36.1 °C) (Temporal)   Resp 16   Ht 5' 9\" (1.753 m)   Wt 262 lb 3.2 oz (118.9 kg)   SpO2 98%   BMI 38.72 kg/m²   24HR INTAKE/OUTPUT:      Intake/Output Summary (Last 24 hours) at 3/13/2022 1406  Last data filed at 3/13/2022 1052  Gross per 24 hour   Intake 240 ml   Output    Net 240 ml         Physical Exam  Constitutional:       General: He is not in acute distress. Appearance: Normal appearance. He is not ill-appearing. HENT:      Head: Normocephalic and atraumatic. Right Ear: External ear normal.      Left Ear: External ear normal.      Nose: Nose normal.      Mouth/Throat:      Mouth: Mucous membranes are moist.   Eyes:      Extraocular Movements: Extraocular movements intact. Conjunctiva/sclera: Conjunctivae normal.      Pupils: Pupils are equal, round, and reactive to light. Cardiovascular:      Rate and Rhythm: Regular rhythm. Bradycardia present. Pulses: Normal pulses. Heart sounds: Murmur heard. Comments: SB 40 per monitor   Pulmonary:      Effort: Pulmonary effort is normal. No respiratory distress. Breath sounds: Normal breath sounds. No wheezing, rhonchi or rales. Abdominal:      General: Bowel sounds are normal. There is no distension. Palpations: Abdomen is soft. Tenderness: There is no abdominal tenderness. Musculoskeletal:         General: No swelling, tenderness or deformity. Normal range of motion. Cervical back: Normal range of motion and neck supple. No muscular tenderness. Right lower leg: No edema. Left lower leg: No edema. Skin:     General: Skin is warm and dry. Findings: No bruising or lesion. Neurological:      Mental Status: He is alert and oriented to person, place, and time. Psychiatric:         Mood and Affect: Mood normal.         Behavior: Behavior normal.         Thought Content: Thought content normal.            Medications:      sodium chloride        levothyroxine  25 mcg Oral Daily    [START ON 3/14/2022] enoxaparin  40 mg SubCUTAneous Daily    sodium chloride flush  5-40 mL IntraVENous 2 times per day    famotidine (PEPCID) injection  20 mg IntraVENous BID    allopurinol  300 mg Oral Daily    aspirin  81 mg Oral Daily    atorvastatin  40 mg Oral Every Other Day    gabapentin  300 mg Oral Nightly    [Held by provider] metoprolol tartrate  25 mg Oral BID     perflutren lipid microspheres, sodium chloride flush, sodium chloride, ondansetron **OR** ondansetron, polyethylene glycol, acetaminophen **OR** acetaminophen  ADULT DIET;  Regular; Low Fat/Low Chol/High Fiber/LEYDA; Low Sodium (2 gm)  Diet NPO     Lab and other Data:     Recent Labs     03/12/22 2022 03/13/22  0047   WBC 6.0 5.8   HGB 13.4* 12.6*    149     Recent Labs     03/12/22 2022 03/13/22  0047    142   K 4.2 3.8    105   CO2 28 26   BUN 23 23   CREATININE 1.4* 1.4*   GLUCOSE 102 138*     Recent Labs     03/12/22 2022 03/13/22  0047   AST 23 20   ALT 33 28   BILITOT 0.5 0.5   ALKPHOS 110 95     Troponin T:   Recent Labs     03/13/22  0047 03/13/22  0514 03/13/22  1100   TROPONINI 0.03 0.02 0.01     INR:   Recent Labs     03/12/22 2022   INR 0.89     UA:  Recent Labs     03/12/22  2355   COLORU YELLOW   PHUR 5.5   CLARITYU Clear   SPECGRAV 1.038   LEUKOCYTESUR Negative   UROBILINOGEN 0.2   BILIRUBINUR Negative   BLOODU Negative   GLUCOSEU Negative       RAD:     XR CHEST PORTABLE  Result Date: 3/12/2022    1. . Left basilar atelectasis. Lungs are otherwise clear. Signed by Dr Pamella Vo  Result Date: 3/12/2022    1. No evidence of pulmonary thromboembolic disease. 2. The thoracic aorta and great vessels are mildly ectatic. No evidence of focal aneurysm or dissection. 3. Cardiomegaly with coronary calcifications. No evidence of pericardial effusion. 4. Bibasilar atelectasis. There is mild bronchial wall thickening within the lower lobes. 5. Elevated right heart pressure is suspected with reflux of contrast into the intrahepatic IVC. Denys Whitney Signed by Dr Lee Rule:    COVID-19, Rapid [[id number]] Collected: 03/12/22 2109   Order Status: Completed Specimen: Nasopharyngeal Swab Updated: 03/12/22 2131    SARS-CoV-2, NAAT Not Detected         Assessment/Plan   Principal Problem:    Tachycardia induced cardiomyopathy ([facility]  Active Problems:    CAD (coronary artery disease)    Hypertension    Family history of early CAD    Obesity    Aortic stenosis    Hyperlipemia    Diabetes mellitus ([facility]    S/P AVR    History of coronary artery stent placement    S/P aortic valve replacement with bioprosthetic valve    Valvular heart disease    Chronic diastolic CHF (congestive heart failure) (HCC)    Chronic bilateral low back pain with right-sided sciatica    Paroxysmal A-fib ([facility]  Resolved Problems:    * No resolved hospital problems.  *      Principal Problem:    Tachycardia induced cardiomyopathy (HCC)/ CAD (coronary artery disease)/ Paroxysmal A-fib (HCC)-   - Cardiology following   - Currently SB 45   - Aspirin and Lipitor  - TSH 5.69, continue Synthroid   - SL Nitro PRN   - ECHO completed, results pending   - Trend troponin- negative     - Serial and PRN EKGs  - Monitor on telemetry   - NPO after midnight for lexiscan per cardiology         Active Problems:    Hypertension- stable at this time, continue current medication, monitor BP closely Obesity- noted       Hyperlipemia- continue statin       Diabetes mellitus ([facility]-    - A1c         Aortic stenosis/ S/P AVR/ S/P aortic valve replacement with bioprosthetic valve/ Valvular heart disease- noted       Chronic diastolic CHF (congestive heart failure) ([facility]- noted, cardiology following             DVT Prophylaxis: Lovenox       GI prophylaxis:  Pepcid      Discharge planning: TBD        Further Orders per Clinical course/attending. Electronically signed by CHERYL Valdez CNP on 3/13/2022 at 2:06 PM       EMR Dragon/Transcription disclaimer:   Much of this encounter note is an electronic transcription/translation of spoken language to printed text.  The electronic translation of spoken language may permit erroneous, or at times, nonsensical words or phrases to be inadvertently transcribed; although attempts have made to review the note for such errors, some may still exist.

## 2022-03-13 NOTE — CONSULTS
[facility]) Cardiology   Consult Note   Anup Spence       Requesting MD:  Coni Lawson MD   Admit Status:         History obtained from:   [x] Patient  [] Other (specify):     Patient:  Geovani Akers  [id number]     Chief Complaint:   Chief Complaint   Patient presents with    Chest Pain    Tachycardia     rate into 140s at home, pt states it is usually in the 45s       HPI: Mr. Delfina Ye is a 68 y.o. male with a history of aortic stenosis status post surgical AVR, coronary artery disease with previous stenting to circumflex coronary artery, history of diabetes, paroxysmal A. fib, hypertension hyperlipidemia and history of tachycardia induced cardiomyopathy    He was admitted to the hospital yesterday after an episode of significant chest pain, palpitation with heart rate up to 140 beats per minutes, his chest pain is felt mostly central radiating to both jaws felt as a pressure heaviness, history of nausea but no vomiting    Notably after admission on telemetry floor patient was bradycardic to heart rate of 30s    He denies any dizziness or syncope and he said his heart rate is always in the 50s    Patient is known to our practice he was followed telemetry for many years but he has not seen cardiologist for the last 4 to 5 years    Review of Systems:  Review of Systems   Constitutional: Positive for fatigue. Respiratory: Negative. Cardiovascular: Positive for chest pain and palpitations. Gastrointestinal: Positive for nausea. Endocrine: Negative. Genitourinary: Negative. Musculoskeletal: Negative. Skin: Negative. Neurological: Negative. Hematological: Negative. All other systems reviewed and are negative.       Cardiac Specific Data:  Specialty Problems        Cardiology Problems    CAD (coronary artery disease)        Hypertension        Aortic stenosis        Hyperlipemia        Chronic diastolic CHF (congestive heart failure) (HCC)        Valvular heart disease        * (Principal) Tachycardia induced cardiomyopathy (HCC)        Paroxysmal A-fib (HCC)              Past Medical History:  Past Medical History:   Diagnosis Date    Aortic stenosis     moderate    CAD (coronary artery disease)     stent to Circ    Chest tightness, discomfort, or pressure 3/23/2012    Chronic kidney disease     Stage 4    Diabetes mellitus ([facility]     Family history of early CAD 3/23/2012    GERD (gastroesophageal reflux disease)     Hyperlipemia     Hypertension 3/23/2012    Obesity     Unspecified sleep apnea         Past Surgical History:  Past Surgical History:   Procedure Laterality Date    AORTIC VALVE REPLACEMENT  07/21/15    Tissue Valve, Dr. Amrik Foote  3/26/12    CARDIAC CATHETERIZATION  2013  JDT    with stent to Circ- EF 50%    CARDIAC CATHETERIZATION  13  JDT    EF 60%, no intervention    CARDIAC CATHETERIZATION  7/20/15  JDT    severe aortic insufficiency. EF 50%    THORACENTESIS Left 08/04/15    VASCULAR SURGERY  13 [facility] & [address] R superficial femoral artery and psuedoaneurysm. Thrombin injection R SFA pseudoaneurysm.         Past Family History:  Family History   Problem Relation Age of Onset    Heart Disease Other     High Blood Pressure Other        Past Social History:  Social History     Socioeconomic History    Marital status:      Spouse name: Not on file    Number of children: Not on file    Years of education: Not on file    Highest education level: Not on file   Occupational History    Not on file   Tobacco Use    Smoking status: Former Smoker     Quit date: 1985     Years since quittin.7    Smokeless tobacco: Never Used   Vaping Use    Vaping Use: Never used   Substance and Sexual Activity    Alcohol use: No    Drug use: No    Sexual activity: Not on file   Other Topics Concern    Not on file   Social History Narrative    Not on file     Social Determinants of Health     Financial Resource Strain:    Sophie Difficulty of Paying Living Expenses: Not on file   Food Insecurity:     Worried About Running Out of Food in the Last Year: Not on file    Ran Out of Food in the Last Year: Not on file   Transportation Needs:     Lack of Transportation (Medical): Not on file    Lack of Transportation (Non-Medical): Not on file   Physical Activity:     Days of Exercise per Week: Not on file    Minutes of Exercise per Session: Not on file   Stress:     Feeling of Stress : Not on file   Social Connections:     Frequency of Communication with Friends and Family: Not on file    Frequency of Social Gatherings with Friends and Family: Not on file    Attends Protestant Services: Not on file    Active Member of [address] [place] [address] [employer] or Organizations: Not on file    Attends Club or Organization Meetings: Not on file    Marital Status: Not on file   Intimate Partner Violence:     Fear of Current or Ex-Partner: Not on file    Emotionally Abused: Not on file    Physically Abused: Not on file    Sexually Abused: Not on file   Housing Stability:     Unable to Pay for Housing in the Last Year: Not on file    Number of [place] in the Last Year: Not on file    Unstable Housing in the Last Year: Not on file       Allergies:  No Known Allergies    Home Meds:  Prior to Admission medications    Medication Sig Start Date End Date Taking?  Authorizing Provider   tamsulosin (FLOMAX) 0.4 MG capsule Take 0.4 mg by mouth daily   Yes Historical Provider, MD   allopurinol (ZYLOPRIM) 300 MG tablet Take 300 mg by mouth daily   Yes Historical Provider, MD   metoprolol tartrate (LOPRESSOR) 25 MG tablet TAKE ONE TABLET BY MOUTH TWICE DAILY 4/2/18  Yes CHERYL Pollack   isosorbide mononitrate (IMDUR) 30 MG extended release tablet Take 30 mg by mouth daily   Yes Historical Provider, MD   Coenzyme Q10 (COQ10) 200 MG CAPS Take 200 mg by mouth nightly    Yes Historical Provider, MD   bumetanide (BUMEX) 2 MG tablet 2 mg 2 times daily  7/13/17  Yes Historical Provider, MD   aspirin 81 MG tablet Take 81 mg by mouth daily   Yes Historical Provider, MD   atorvastatin (LIPITOR) 40 MG tablet Take 1 tablet by mouth daily. Patient taking differently: Take 40 mg by mouth every other day  6/20/14  Yes CHERYL Gray   omeprazole (PRILOSEC) 20 MG capsule Take 20 mg by mouth daily. Yes Historical Provider, MD   gabapentin (NEURONTIN) 300 MG capsule Take 300 mg by mouth nightly    Yes Historical Provider, MD       Current Meds:   levothyroxine  25 mcg Oral Daily    [START ON 3/14/2022] enoxaparin  40 mg SubCUTAneous Daily    sodium chloride flush  5-40 mL IntraVENous 2 times per day    famotidine (PEPCID) injection  20 mg IntraVENous BID    allopurinol  300 mg Oral Daily    aspirin  81 mg Oral Daily    atorvastatin  40 mg Oral Every Other Day    gabapentin  300 mg Oral Nightly    [Held by provider] metoprolol tartrate  25 mg Oral BID       Current Infused Meds:   sodium chloride         Physical Exam:  Vitals:    03/13/22 0018   BP: 133/65   Pulse: (!) 47   Resp: 16   Temp: 97 °F (36.1 °C)   SpO2: 98%       Intake/Output Summary (Last 24 hours) at 3/13/2022 1418  Last data filed at 3/13/2022 1052  Gross per 24 hour   Intake 240 ml   Output    Net 240 ml     Estimated body mass index is 38.72 kg/m² as calculated from the following:    Height as of this encounter: 5' 9\" (1.753 m). Weight as of this encounter: 262 lb 3.2 oz (118.9 kg). Physical Exam  Vitals and nursing note reviewed. Constitutional:       Appearance: Normal appearance. He is obese. HENT:      Head: Normocephalic and atraumatic. Mouth/Throat:      Mouth: Mucous membranes are moist.   Cardiovascular:      Rate and Rhythm: Regular rhythm. Bradycardia present. Pulses: Normal pulses. Heart sounds: Murmur heard. Pulmonary:      Effort: Pulmonary effort is normal.      Breath sounds: Normal breath sounds.    Abdominal:      General: Bowel sounds are normal. Palpations: Abdomen is soft. Tenderness: There is no abdominal tenderness. Musculoskeletal:         General: Normal range of motion. Right lower leg: No edema. Left lower leg: No edema. Skin:     General: Skin is warm. Neurological:      General: No focal deficit present. Mental Status: He is alert and oriented to person, place, and time. Psychiatric:         Mood and Affect: Mood normal.         Behavior: Behavior normal.         Labs:  Recent Labs     03/12/22 2022 03/13/22 0047   WBC 6.0 5.8   HGB 13.4* 12.6*    149       Recent Labs     03/12/22 2022 03/13/22  0047    142   K 4.2 3.8    105   CO2 28 26   BUN 23 23   CREATININE 1.4* 1.4*   LABGLOM 49* 49*   CALCIUM 9.5 9.0       CK, CKMB, Troponin:   Recent Labs     03/13/22  0047 03/13/22  0514 03/13/22  1100   TROPONINI 0.03 0.02 0.01       Last 3 BNP:  Recent Labs     03/12/22 2022 03/13/22 0047   PROBNP 1,073 864         IMAGING:    EKG -severe bradycardia heart rate of 35    ECHO   TTE procedure:ECHO 2D W/DOPPLER/COLOR/CONTRAST. Study Location: Echo Lab  Technical Quality: Adequate visualization     Patient Status: Inpatient     Contrast Medium: Definity. Amount - 4 ml     HR: 39 bpm BP: 133/65 mmHg     Indications:S/P AVR.      Conclusions      Summary   Structurally normal mitral valve with mildly reduced leaflet mobility. No evidence of mitral valve stenosis, mild mitral regurgitation. Mitral annular calcification is present. bioprosthetic valve in the aortic position that is functioning normally. There is mild perivalvular leak   Mean gradient across the aortic valve is 22 and peak gradient is 55 mmHg   with velocity of 3.7 m/s   Tricuspid valve is structurally normal.   Mild tricuspid regurgitation with estimated RVSP of 33 mm Hg. The pulmonic valve was not well visualized. Mildly dilated left atrium.    Normal left ventricular size with preserved LV function and an estimated   ejection fraction of approximately 55-60%. No evidence of left ventricular mass or thrombus noted. Mildly enlarged right atrium size. Grossly normal RV size and function   Aortic root is within normal limits. IVC normal.   No evidence of significant pericardial effusion is noted. Signature      ----------------------------------------------------------------   Electronically signed by Pk Melo MD(Interpreting   physician) on 03/13/2022 03:10 PM   ----------------------------------------------------------------      XR CHEST PORTABLE    Result Date: 3/12/2022  1. . Left basilar atelectasis. Lungs are otherwise clear. Signed by Dr Elizabeth Mabry    Result Date: 3/12/2022  1. No evidence of pulmonary thromboembolic disease. 2. The thoracic aorta and great vessels are mildly ectatic. No evidence of focal aneurysm or dissection. 3. Cardiomegaly with coronary calcifications. No evidence of pericardial effusion. 4. Bibasilar atelectasis. There is mild bronchial wall thickening within the lower lobes. 5. Elevated right heart pressure is suspected with reflux of contrast into the intrahepatic IVC. [facility] Case Signed by Dr Cabrera Phlegm and Plan:  1. Tachybradycardia syndrome -patient reported history of palpitation, he denies any history of A. fib but his record mentions that, his heart rate fluctuated between significant tachycardia with heart rate of 142 bradycardia with lowest heart rates in the 30s, he reported palpitation but no dizziness or syncope, will continue monitor on telemetry for any significant pauses or severe bradycardia, will hold metoprolol, patient was told in the past that he might need permanent pacemaker placement at some point, further recommendation will follow  2.  Chest pain with history of coronary artery disease -with previous stent to the circumflex, patient symptoms is concerning with pain radiation to the bilateral jaws, troponin is negative x3, EKG showed no acute ischemic changes, I would like to proceed with nuclear stress test in the morning to rule out any significant ischemia  3. Severe aortic stenosis status post surgical AVR 2015 -echo was performed today and showed normal LV stent function of 55 to 60%, normally functioning aortic bioprosthetic valve with moderate gradient and mild perivalvular leak, will need follow-up echo in 1 year as outpatient  4. Hypertension -blood pressures well controlled  5. Hyperlipidemia -continue with statin    Continue with current medications, metoprolol was on hold    Thank you for the consult, we appreciate the opportunity to provide care to your patients. Feel free to contact me if I can be of any further assistance.       Electronically signed by Apurva Menjivar MD on 3/13/2022 at 2:18 PM    Apurva Menjivar MD, [facility], [facility]  Interventional Cardiologist, Endovascular Specialist   Medical Director, Structural Heart Program   [facility]       (Please note that portions of this note were completed with a voice recognition program.  Effortswere made to edit the dictations but occasionally words are mis-transcribed.)

## 2022-03-13 NOTE — H&P
HISTORY AND PHYSICAL             Date: 3/13/2022        Patient Name: Jennifer Vila     YOB: 1945      Age:  68 y.o. Chief Complaint     Chief Complaint   Patient presents with    Chest Pain    Tachycardia     rate into 140s at home, pt states it is usually in the 40s    he is coming in for tachycardia. And palpitations   And he had not felt that in the past.   He usually had heart rate in the 40's   He has not seen his cardiologist Dr Layla Lynch in about 4 years or so. History Obtained From   Patient     History of Present Illness   68year old male with history of cad and overall   Has cad and aortic stenosis and cad and stent to the left circumflex. He has had some palpitations over the last one day.  He has never had that in the past.     He has not being feeling well   No chest pain now   No palpitations by the time he got here   No nausea  No radiation of pain   No weakness   No dizziness     He usually states that his heart rate is in the 40's to 50's     It has been about 4 to 5 years since he has been for a follow up visit with cardiology     He has not seen dr. Enriqueta Anderson in follow up in quite sometime       Past Medical History     Past Medical History:   Diagnosis Date    Aortic stenosis     moderate    CAD (coronary artery disease) 5/13    stent to Circ    Chest tightness, discomfort, or pressure 3/23/2012    Chronic kidney disease     Stage 4    Diabetes mellitus ([address]     Family history of early CAD 3/23/2012    GERD (gastroesophageal reflux disease)     Hyperlipemia     Hypertension 3/23/2012    Obesity     Unspecified sleep apnea         Past Surgical History     Past Surgical History:   Procedure Laterality Date    AORTIC VALVE REPLACEMENT  07/21/15    Tissue Valve, Dr. Nury Pavon  3/26/12    CARDIAC CATHETERIZATION  5/21/2013  JDT    with stent to Circ- EF 50%    CARDIAC CATHETERIZATION  11/4/13  JDT    EF 60%, no intervention    CARDIAC CATHETERIZATION  7/20/15  JDT    severe aortic insufficiency. EF 50%    THORACENTESIS Left 08/04/15    VASCULAR SURGERY  11-6-13 [facility] & [address] R superficial femoral artery and psuedoaneurysm. Thrombin injection R SFA pseudoaneurysm. Medications Prior to Admission     Prior to Admission medications    Medication Sig Start Date End Date Taking? Authorizing Provider   tamsulosin (FLOMAX) 0.4 MG capsule Take 0.4 mg by mouth daily   Yes Historical Provider, MD   allopurinol (ZYLOPRIM) 300 MG tablet Take 300 mg by mouth daily   Yes Historical Provider, MD   metoprolol tartrate (LOPRESSOR) 25 MG tablet TAKE ONE TABLET BY MOUTH TWICE DAILY 4/2/18  Yes CHERYL Alford   isosorbide mononitrate (IMDUR) 30 MG extended release tablet Take 30 mg by mouth daily   Yes Historical Provider, MD   Coenzyme Q10 (COQ10) 200 MG CAPS Take 200 mg by mouth nightly    Yes Historical Provider, MD   bumetanide (BUMEX) 2 MG tablet 2 mg 2 times daily  7/13/17  Yes Historical Provider, MD   aspirin 81 MG tablet Take 81 mg by mouth daily   Yes Historical Provider, MD   atorvastatin (LIPITOR) 40 MG tablet Take 1 tablet by mouth daily. Patient taking differently: Take 40 mg by mouth every other day  6/20/14  Yes CHERYL Damon   omeprazole (PRILOSEC) 20 MG capsule Take 20 mg by mouth daily. Yes Historical Provider, MD   gabapentin (NEURONTIN) 300 MG capsule Take 300 mg by mouth nightly    Yes Historical Provider, MD        Allergies   Patient has no known allergies.     Social History     Social History     Tobacco History     Smoking Status  Former Smoker Quit date  6/9/1985    Smokeless Tobacco Use  Never Used          Alcohol History     Alcohol Use Status  No          Drug Use     Drug Use Status  No          Sexual Activity     Sexually Active  Not Asked                Family History     Family History   Problem Relation Age of Onset    Heart Disease Other     High Blood Pressure Other        Review of Systems   Review of Systems   Constitutional: Positive for activity change. HENT: Negative. Eyes: Negative. Respiratory: Negative. Cardiovascular: Positive for chest pain and palpitations. Gastrointestinal: Positive for nausea. Endocrine: Negative. Genitourinary: Negative. Musculoskeletal: Negative. Allergic/Immunologic: Negative. Neurological: Negative. Hematological: Negative. All other systems reviewed and are negative. Physical Exam   /65   Pulse (!) 47 Comment: sinus hamzah. pt stated this is normal rate for him  Temp 97 °F (36.1 °C) (Temporal)   Resp 16   Ht 5' 9\" (1.753 m)   Wt 256 lb (116.1 kg)   SpO2 98%   BMI 37.80 kg/m²     Physical Exam  Vitals and nursing note reviewed. Constitutional:       Appearance: He is normal weight. HENT:      Head: Normocephalic and atraumatic. Nose: Nose normal.      Mouth/Throat:      Mouth: Mucous membranes are moist.   Eyes:      Pupils: Pupils are equal, round, and reactive to light. Cardiovascular:      Rate and Rhythm: Tachycardia present. Rhythm irregular. Pulses: Normal pulses. Heart sounds: Normal heart sounds. Comments: Initially though he had a cardiac rhythm that was atrial fibrillation and it was tachycardia. Pulmonary:      Effort: Pulmonary effort is normal.   Abdominal:      General: Abdomen is flat. Bowel sounds are normal.   Musculoskeletal:         General: Normal range of motion. Cervical back: Normal range of motion. Skin:     General: Skin is warm. Neurological:      General: No focal deficit present.    Psychiatric:         Mood and Affect: Mood normal.         Labs      Recent Results (from the past 24 hour(s))   Comprehensive Metabolic Panel    Collection Time: 03/12/22  8:22 PM   Result Value Ref Range    Sodium 142 136 - 145 mmol/L    Potassium 4.2 3.5 - 5.0 mmol/L    Chloride 103 98 - 111 mmol/L    CO2 28 22 - 29 mmol/L    Anion Gap 11 7 - 19 mmol/L    Glucose 102 74 - 109 mg/dL BUN 23 8 - 23 mg/dL    CREATININE 1.4 (H) 0.5 - 1.2 mg/dL    GFR Non- 49 (A) >60    GFR African American >59 >59    Calcium 9.5 8.8 - 10.2 mg/dL    Total Protein 6.9 6.6 - 8.7 g/dL    Albumin 4.2 3.5 - 5.2 g/dL    Total Bilirubin 0.5 0.2 - 1.2 mg/dL    Alkaline Phosphatase 110 40 - 130 U/L    ALT 33 5 - 41 U/L    AST 23 5 - 40 U/L   CBC with Auto Differential    Collection Time: 03/12/22  8:22 PM   Result Value Ref Range    WBC 6.0 4.8 - 10.8 K/uL    RBC 4.33 (L) 4.70 - 6.10 M/uL    Hemoglobin 13.4 (L) 14.0 - 18.0 g/dL    Hematocrit 41.9 (L) 42.0 - 52.0 %    MCV 96.8 (H) 80.0 - 94.0 fL    MCH 30.9 27.0 - 31.0 pg    MCHC 32.0 (L) 33.0 - 37.0 g/dL    RDW 13.6 11.5 - 14.5 %    Platelets 278 716 - 675 K/uL    MPV 10.7 9.4 - 12.4 fL    Neutrophils % 63.5 50.0 - 65.0 %    Lymphocytes % 23.1 20.0 - 40.0 %    Monocytes % 10.0 0.0 - 10.0 %    Eosinophils % 2.5 0.0 - 5.0 %    Basophils % 0.7 0.0 - 1.0 %    Neutrophils Absolute 3.8 1.5 - 7.5 K/uL    Immature Granulocytes # 0.0 K/uL    Lymphocytes Absolute 1.4 1.1 - 4.5 K/uL    Monocytes Absolute 0.60 0.00 - 0.90 K/uL    Eosinophils Absolute 0.20 0.00 - 0.60 K/uL    Basophils Absolute 0.00 0.00 - 0.20 K/uL   Protime-INR    Collection Time: 03/12/22  8:22 PM   Result Value Ref Range    Protime 12.3 12.0 - 14.6 sec    INR 0.89 0.88 - 1.18   Troponin    Collection Time: 03/12/22  8:22 PM   Result Value Ref Range    Troponin <0.01 0.00 - 0.03 ng/mL   Brain Natriuretic Peptide    Collection Time: 03/12/22  8:22 PM   Result Value Ref Range    Pro-BNP 1,073 0 - 1,800 pg/mL   D-Dimer, Quantitative    Collection Time: 03/12/22  8:22 PM   Result Value Ref Range    D-Dimer, Quant 0.81 (H) 0.00 - 0.48 ug/mL FEU   COVID-19, Rapid    Collection Time: 03/12/22  9:09 PM    Specimen: Nasopharyngeal Swab   Result Value Ref Range    SARS-CoV-2, NAAT Not Detected Not Detected   Troponin    Collection Time: 03/12/22 10:16 PM   Result Value Ref Range    Troponin 0.02 0.00 - 0.03 ng/mL   Urinalysis with Reflex to Culture    Collection Time: 03/12/22 11:55 PM   Result Value Ref Range    Color, UA YELLOW Straw/Yellow    Clarity, UA Clear Clear    Glucose, Ur Negative Negative mg/dL    Bilirubin Urine Negative Negative    Ketones, Urine Negative Negative mg/dL    Specific Gravity, UA 1.038 1.005 - 1.030    Blood, Urine Negative Negative    pH, UA 5.5 5.0 - 8.0    Protein, UA Negative Negative mg/dL    Urobilinogen, Urine 0.2 <2.0 E.U./dL    Nitrite, Urine Negative Negative    Leukocyte Esterase, Urine Negative Negative   Troponin    Collection Time: 03/13/22 12:47 AM   Result Value Ref Range    Troponin 0.03 0.00 - 0.03 ng/mL   Comprehensive Metabolic Panel w/ Reflex to MG    Collection Time: 03/13/22 12:47 AM   Result Value Ref Range    Sodium 142 136 - 145 mmol/L    Potassium reflex Magnesium 3.8 3.5 - 5.0 mmol/L    Chloride 105 98 - 111 mmol/L    CO2 26 22 - 29 mmol/L    Anion Gap 11 7 - 19 mmol/L    Glucose 138 (H) 74 - 109 mg/dL    BUN 23 8 - 23 mg/dL    CREATININE 1.4 (H) 0.5 - 1.2 mg/dL    GFR Non- 49 (A) >60    GFR African American >59 >59    Calcium 9.0 8.8 - 10.2 mg/dL    Total Protein 5.7 (L) 6.6 - 8.7 g/dL    Albumin 3.9 3.5 - 5.2 g/dL    Total Bilirubin 0.5 0.2 - 1.2 mg/dL    Alkaline Phosphatase 95 40 - 130 U/L    ALT 28 5 - 41 U/L    AST 20 5 - 40 U/L   CBC with Auto Differential    Collection Time: 03/13/22 12:47 AM   Result Value Ref Range    WBC 5.8 4.8 - 10.8 K/uL    RBC 4.12 (L) 4.70 - 6.10 M/uL    Hemoglobin 12.6 (L) 14.0 - 18.0 g/dL    Hematocrit 39.9 (L) 42.0 - 52.0 %    MCV 96.8 (H) 80.0 - 94.0 fL    MCH 30.6 27.0 - 31.0 pg    MCHC 31.6 (L) 33.0 - 37.0 g/dL    RDW 13.7 11.5 - 14.5 %    Platelets 173 048 - 126 K/uL    MPV 10.2 9.4 - 12.4 fL    Neutrophils % 66.8 (H) 50.0 - 65.0 %    Lymphocytes % 22.5 20.0 - 40.0 %    Monocytes % 7.4 0.0 - 10.0 %    Eosinophils % 2.6 0.0 - 5.0 %    Basophils % 0.5 0.0 - 1.0 %    Neutrophils Absolute 3.9 1.5 - 7.5 K/uL Immature Granulocytes # 0.0 K/uL    Lymphocytes Absolute 1.3 1.1 - 4.5 K/uL    Monocytes Absolute 0.40 0.00 - 0.90 K/uL    Eosinophils Absolute 0.20 0.00 - 0.60 K/uL    Basophils Absolute 0.00 0.00 - 0.20 K/uL   Brain Natriuretic Peptide    Collection Time: 03/13/22 12:47 AM   Result Value Ref Range    Pro- 0 - 1,800 pg/mL        Imaging/Diagnostics Last 24 Hours   XR CHEST PORTABLE    Result Date: 3/12/2022  EXAMINATION: Chest one view 3/12/2022 HISTORY: Chest pain FINDINGS: Today's exam is compared to previous study of 8/9/2016. Mild left basilar atelectasis is present. Lungs are otherwise clear. There is no effusion or free air present. The patient's undergone prior median sternotomy for coronary bypass grafting. 1.. Left basilar atelectasis. Lungs are otherwise clear. Signed by Dr Azar Zhang Date: 3/12/2022  CTA PULMONARY W CONTRAST 3/12/2022 9:05 PM HISTORY: Chest pain and tachycardia COMPARISON: 10/5/2016. DLP: 709 mGy cm. All CT scans are performed using dose optimization techniques as appropriate to the performed exam and include at least one of the following: Automated exposure control, adjustment of the mA and/or kV according to size, and the use of the iterative reconstruction technique. TECHNIQUE: Helical tomographic images of the chest were obtained after the administration of intravenous contrast following angiogram protocol. Additionally, 3D MIP reconstructions in the coronal and sagittal planes were provided. FINDINGS:  Pulmonary arteries: There is adequate enhancement of the pulmonary arteries to evaluate for central and segmental pulmonary emboli. There are no filling defects within the main, lobar, segmental or visualized subsegmental pulmonary arteries. The pulmonary vessels are within normal limits. Aorta and great vessels: The thoracic aorta is well opacified without evidence of dissection.  No evidence of focal aneurysm although the thoracic aorta and proximal great vessels are ectatic. . The great vessels are otherwise normal in appearance. Jacqueline Pemberton Heavy coronary calcifications are present. . Neck base: The imaged portion of the base of the neck appears unremarkable. Lungs: Bibasilar atelectasis is present. There is mild bronchial wall thickening within the lower lobes. No evidence of consolidative pneumonia or nodularity. . The trachea and bronchial tree are otherwise patent. Jacqueline Pemberton Heart: There is moderate cardiomegaly. Elevated right heart pressures suspected with reflux of contrast into the intrahepatic IVC. Jacqueline Pemberton There is no pericardial effusion. Lymph nodes: No pathologically enlarged mediastinal, hilar, or axillary lymph nodes are present. Bones and soft tissues: The patient's undergone previous median sternotomy. 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 No acute bony abnormality are present. Upper abdomen: There is mild steatosis of the liver. The adrenals are unremarkable. .     1. No evidence of pulmonary thromboembolic disease. 2. The thoracic aorta and great vessels are mildly ectatic. No evidence of focal aneurysm or dissection. 3. Cardiomegaly with coronary calcifications. No evidence of pericardial effusion. 4. Bibasilar atelectasis. There is mild bronchial wall thickening within the lower lobes. 5. Elevated right heart pressure is suspected with reflux of contrast into the intrahepatic IVC. Jacqueline Pemberton  Signed by Dr Nila Muse    * (Principal) Tachycardia induced cardiomyopathy ([facility] [address] 3/12/2022 Yes    CAD (coronary artery disease) 3/13/2022 Yes    Overview Addendum 9/1/2016  3:52 PM by Rip Cheng MD     3/23/2012  SE  Positive for myocardial ischemia Jn Roper)  3/26/12  Cath  Mild CAD, 50% RCA, normal LVFX  5/21/2013  ACS DAVID Risk Score 5 (angina, aspirin, known CAD > 50%, age > 72 years, + troponin), AUC indication 3, AUC score 9, [facility] [id number] 5/21/2013  Cath  Mild diffuse CAD, 70 % mid LCX, 2.75 x 8 DONALDO, mild anterior lateral hypokinesis, EF 50%  11/3/2013  ACS DAVID Risk Score 4 (angina, aspirin, known CAD > 50%, age > 72 years), AUC indication 3, AUC score 8, [facility] [id number] 11/4/2013  Cath  Mild CAD, 50% RCA, o/w luminals, normal LVFX  07/20/2015  Cath  Mild to moderate CAD, normal LVFX, severe AI, PA 29/10  7/21/2015  AVR bovine valve Elsi Clack)  9/1/16  lexiscan  negative for myocardial ischemia, EF 52  %               Hypertension 3/13/2022 Yes    Family history of early CAD 3/13/2022 Yes    Obesity 3/13/2022 Yes    Overview Signed 5/21/2013  7:07 PM by Jose Alberto Kay MD     5/21/2013  BMI 37         Aortic stenosis 3/13/2022 Yes    Overview Addendum 7/20/2015 11:54 AM by Jose Alberto Kay MD     5/22/2013  Echo  Moderate AS and AI, transaortic valve gradient 36 mmHg  6/22/2015  Echo  Moderate to severe AI, normal LVFX, RVSP 45 mmhg, transaortic valve gradient 35 mmHg  07/20/2015  Cath  Mild to moderate CAD, normal LVFX, severe AI, PA 29/10         Pseudoaneurysm of femoral artery ([address] 3/13/2022 Yes    Hyperlipemia 3/13/2022 Yes    Diabetes mellitus ([address] 3/13/2022 Yes    Jaw pain 3/13/2022 Yes    S/P AVR 3/13/2022 Yes    SOB (shortness of breath) 3/13/2022 Yes    History of coronary artery stent placement 3/13/2022 Yes    PRINCE (dyspnea on exertion) 3/13/2022 Yes    S/P aortic valve replacement with bioprosthetic valve 3/13/2022 Yes    Valvular heart disease 3/13/2022 Yes    Chronic diastolic CHF (congestive heart failure) ([address] 3/13/2022 Yes    Chronic bilateral low back pain with right-sided sciatica 3/13/2022 Yes    Greater trochanteric bursitis of right hip 3/13/2022 Yes    Bilateral carpal tunnel syndrome 3/13/2022 Yes    Paroxysmal A-fib ([address] 3/13/2022 Yes          Plan   1. Patient is a 68year old male with known heart disease   2. Cad and cardiac stent and left circumflex   3. Aortic stenosis and biprosthetic valve in place   4.  He has not followed up with cardiology in 4 years with cardiology   5. He has been compliant with meds   6. He had tachycardia and palpitations today, with irregular heart beat   7. He has not had that in the past     Admitted with afib paroxysmal afib with new onset sx   And palpitations  Control with dilitazem drip and full dose lovenox and request cardiology to see     And help us assess where we are in the continuum of heart issues. 8 .  Type ii dm stable    9. Diastolic chf stable     10. Low back pain     Sciatica and gait issues. dvt and gi prophylaxis     Cardiology consult ordered. 6.  Hypothyroid new dx and could be contributing to sx and will start low dose of thyroid meds as well.          Consultations Ordered:  IP CONSULT TO CARDIOLOGY    Electronically signed by Joe Hameed MD on 3/12/22 at 11:23 PM CST

## 2022-03-13 NOTE — ED PROVIDER NOTES
[facility] EMERGENCY DEPT  eMERGENCY dEPARTMENT eNCOUnter      Pt Name: Tc Bernal  MRN: [id number]  [place] 1945  Date of evaluation: 3/12/2022  Provider: Dena Vogt MD    [address] [place] [address]       Chief Complaint   Patient presents with    Chest Pain    Tachycardia     rate into 140s at home, pt states it is usually in the 40s         HISTORY OF PRESENT ILLNESS   (Location/Symptom, Timing/Onset,Context/Setting, Quality, Duration, Modifying Factors, Severity)  Note limiting factors. Tc Bernal is a 68 y.o. male who presents to the emergency department with chest pain jaw pain tachycardia and hypertension. Patient was sitting and watching TV around 5:00 when he noticed some jaw tightness and chest pain. He states the chest discomfort felt like when he was given medication during a stress test.  He says his heart rate was up in the 140s. His normal heart rate is in the 40s. His blood pressure was elevated. No prior ho afib or elevated HR int he past.  He denies any shortness of breath nausea or diaphoresis. Patient has had no prior MI but he does have 1 stent. He had an aortic valve replacement in 2015. He used to see Dr. Becca Mora but does not currently have a cardiologist.  Currently chest pain-free. Patient denies any history of blood clots. No calf pain or leg swelling. He recently lost 40 pounds and his blood pressure medications were adjusted and his insulin was discontinued. HPI    NursingNotes were reviewed. REVIEW OF SYSTEMS    (2-9 systems for level 4, 10 or more for level 5)     Review of Systems   Constitutional: Negative for chills and fever. HENT: Negative for rhinorrhea and sore throat. Jaw pain   Respiratory: Negative for shortness of breath. Cardiovascular: Positive for chest pain and palpitations. Negative for leg swelling. Gastrointestinal: Negative for abdominal pain, diarrhea, nausea and vomiting. Genitourinary: Negative for difficulty urinating. Musculoskeletal: Negative for back pain and neck pain. Skin: Negative for rash. Neurological: Negative for weakness and headaches. Psychiatric/Behavioral: Negative for confusion. A complete review of systems was performed and is negative except as noted above in the HPI. PAST MEDICAL HISTORY     Past Medical History:   Diagnosis Date    Aortic stenosis     moderate    CAD (coronary artery disease) 5/13    stent to Circ    Chest tightness, discomfort, or pressure 3/23/2012    Chronic kidney disease     Stage 4    Diabetes mellitus ([facility] [address]     Family history of early CAD 3/23/2012    GERD (gastroesophageal reflux disease)     Hyperlipemia     Hypertension 3/23/2012    Obesity     Unspecified sleep apnea          SURGICAL HISTORY       Past Surgical History:   Procedure Laterality Date    AORTIC VALVE REPLACEMENT  07/21/15    Tissue Valve, Dr. Darshan De Souza  3/26/12    CARDIAC CATHETERIZATION  5/21/2013  JDT    with stent to Circ- EF 50%    CARDIAC CATHETERIZATION  11/4/13  JDT    EF 60%, no intervention    CARDIAC CATHETERIZATION  7/20/15  JDT    severe aortic insufficiency. EF 50%    THORACENTESIS Left 08/04/15    VASCULAR SURGERY  11-6-13 [address] R superficial femoral artery and psuedoaneurysm. Thrombin injection R SFA pseudoaneurysm. CURRENT MEDICATIONS       Previous Medications    ALLOPURINOL (ZYLOPRIM) 300 MG TABLET    Take 300 mg by mouth daily    ASPIRIN 81 MG TABLET    Take 81 mg by mouth daily    ATORVASTATIN (LIPITOR) 40 MG TABLET    Take 1 tablet by mouth daily.     BUMETANIDE (BUMEX) 2 MG TABLET    2 mg 2 times daily     COENZYME Q10 (COQ10) 200 MG CAPS    Take 200 mg by mouth nightly     EPOETIN CUBA (EPOGEN;PROCRIT) 25469 UNIT/ML INJECTION    Inject 40,000 Units into the skin    FEROSUL 325 (65 FE) MG TABLET        GABAPENTIN (NEURONTIN) 300 MG CAPSULE    Take 300 mg by mouth nightly     ISOSORBIDE MONONITRATE (IMDUR) 30 MG EXTENDED RELEASE TABLET    Take 30 mg by mouth daily    MAGNESIUM GLUCONATE (MAGONATE) 500 MG TABLET    Take 250 mg by mouth    METOPROLOL TARTRATE (LOPRESSOR) 25 MG TABLET    TAKE ONE TABLET BY MOUTH TWICE DAILY    MICROLET LANCETS MISC    USE 1 TO CHECK GLUCOSE 4 TIMES DAILY    OMEPRAZOLE (PRILOSEC) 20 MG CAPSULE    Take 20 mg by mouth daily. TAMSULOSIN (FLOMAX) 0.4 MG CAPSULE    Take 0.4 mg by mouth daily       ALLERGIES     Patient has no known allergies. FAMILY HISTORY       Family History   Problem Relation Age of Onset    Heart Disease Other     High Blood Pressure Other           SOCIAL HISTORY       Social History     Socioeconomic History    Marital status:      Spouse name: None    Number of children: None    Years of education: None    Highest education level: None   Occupational History    None   Tobacco Use    Smoking status: Former Smoker     Quit date: 1985     Years since quittin.7    Smokeless tobacco: Never Used   Vaping Use    Vaping Use: Never used   Substance and Sexual Activity    Alcohol use: No    Drug use: No    Sexual activity: None   Other Topics Concern    None   Social History Narrative    None     Social Determinants of Health     Financial Resource Strain:     Difficulty of Paying Living Expenses: Not on file   Food Insecurity:     Worried About Running Out of Food in the Last Year: Not on file    Phill of Food in the Last Year: Not on file   Transportation Needs:     Lack of Transportation (Medical): Not on file    Lack of Transportation (Non-Medical):  Not on file   Physical Activity:     Days of Exercise per Week: Not on file    Minutes of Exercise per Session: Not on file   Stress:     Feeling of Stress : Not on file   Social Connections:     Frequency of Communication with Friends and Family: Not on file    Frequency of Social Gatherings with Friends and Family: Not on file    Attends Yarsani Services: Not on file   [employer] of Clubs or Organizations: Not on file    Attends Club or Organization Meetings: Not on file    Marital Status: Not on file   Intimate Partner Violence:     Fear of Current or Ex-Partner: Not on file    Emotionally Abused: Not on file    Physically Abused: Not on file    Sexually Abused: Not on file   Housing Stability:     Unable to Pay for Housing in the Last Year: Not on file    Number of [place] in the Last Year: Not on file    Unstable Housing in the Last Year: Not on file       SCREENINGS    [place] Coma Scale  Eye Opening: Spontaneous  Best Verbal Response: Oriented  Best Motor Response: Obeys commands  Maria Coma Scale Score: 15 Heart Score for chest pain patients  History: Slightly Suspicious  ECG: Normal  Patient Age: > 65 years  *Risk factors for Atherosclerotic disease: Hypercholesterolemia,Hypertension,Obesity,Coronary Artery Disease,Positive family History  Risk Factors: > 3 Risk factors or history of atherosclerotic disease*  Troponin: < 1X normal limit  Heart Score Total: 4      PHYSICAL EXAM    (up to 7 for level 4, 8 or more for level 5)     ED Triage Vitals [03/12/22 2005]   BP Temp Temp Source Pulse Resp SpO2 Height Weight   (!) 157/67 98.2 °F (36.8 °C) Temporal 118 19 93 % 5' 9\" (1.753 m) 256 lb (116.1 kg)       Physical Exam  Vitals and nursing note reviewed. Constitutional:       General: He is not in acute distress. Appearance: He is well-developed. He is not diaphoretic. HENT:      Head: Normocephalic and atraumatic. Eyes:      Pupils: Pupils are equal, round, and reactive to light. Cardiovascular:      Rate and Rhythm: Regular rhythm. Bradycardia present. Heart sounds: Normal heart sounds. Pulmonary:      Effort: Pulmonary effort is normal. No respiratory distress. Breath sounds: Normal breath sounds. Abdominal:      General: Bowel sounds are normal. There is no distension. Palpations: Abdomen is soft. Tenderness: There is no abdominal tenderness. Musculoskeletal:         General: Normal range of motion. Cervical back: Normal range of motion and neck supple. Skin:     General: Skin is warm and dry. Findings: No rash. Neurological:      Mental Status: He is alert and oriented to person, place, and time. Cranial Nerves: No cranial nerve deficit. Motor: No abnormal muscle tone. Coordination: Coordination normal.   Psychiatric:         Behavior: Behavior normal.         DIAGNOSTIC RESULTS     EKG: All EKG's are interpreted by the Emergency Department Physician who either signs or Co-signs this chart in the absence of a cardiologist.    Sinus rhythm rate 49 nonspecific ST waves no significant changes from prior EKG  sinus bradycardia rate 40 no acute ST wave abnormalities unchanged from prior  Sinus bradycardia rate 43 nonspecific st, unchanged from prior    RADIOLOGY:   Non-plain film images such as CT, Ultrasound and MRI are read by the radiologist. Larry Salvage images are visualized and preliminarily interpreted by the emergency physician with the below findings:        Interpretation per the Radiologist below, if available at the time of this note:    CTA PULMONARY W CONTRAST   Final Result   1. No evidence of pulmonary thromboembolic disease. 2. The thoracic aorta and great vessels are mildly ectatic. No   evidence of focal aneurysm or dissection. 3. Cardiomegaly with coronary calcifications. No evidence of   pericardial effusion. 4. Bibasilar atelectasis. There is mild bronchial wall thickening   within the lower lobes. 5. Elevated right heart pressure is suspected with reflux of contrast   into the intrahepatic IVC. Jacinto Settler Signed by Dr Markos Zhou   Final Result   1.. Left basilar atelectasis. Lungs are otherwise clear.    Signed by Dr Nathalie Wright            ED BEDSIDE ULTRASOUND:   Performed by ED Physician - none    LABS:  Labs Reviewed   COMPREHENSIVE METABOLIC PANEL - Abnormal; Notable for the following components:       Result Value    CREATININE 1.4 (*)     GFR Non- 49 (*)     All other components within normal limits   CBC WITH AUTO DIFFERENTIAL - Abnormal; Notable for the following components:    RBC 4.33 (*)     Hemoglobin 13.4 (*)     Hematocrit 41.9 (*)     MCV 96.8 (*)     MCHC 32.0 (*)     All other components within normal limits   D-DIMER, QUANTITATIVE - Abnormal; Notable for the following components:    D-Dimer, Quant 0.81 (*)     All other components within normal limits   COVID-19, RAPID   PROTIME-INR   TROPONIN   BRAIN NATRIURETIC PEPTIDE   TROPONIN   URINALYSIS WITH REFLEX TO CULTURE   TROPONIN   COMPREHENSIVE METABOLIC PANEL W/ REFLEX TO MG FOR LOW K   CBC WITH AUTO DIFFERENTIAL   BRAIN NATRIURETIC PEPTIDE   TROPONIN   TSH WITH REFLEX TO FT4   TROPONIN       All other labs were within normal range or not returned as of this dictation. EMERGENCY DEPARTMENT COURSE and DIFFERENTIALDIAGNOSIS/MDM:   Vitals:    Vitals:    03/12/22 2005 03/12/22 2245   BP: (!) 157/67 (!) 106/43   Pulse: 118 (!) 40   Resp: 19 18   Temp: 98.2 °F (36.8 °C)    TempSrc: Temporal    SpO2: 93% 94%   Weight: 256 lb (116.1 kg)    Height: 5' 9\" (1.753 m)        MDM  HEART score of 4. D/w Dr Bhanu Velásquez for admission for CP as well as further monitoring for reported HR up to 140s. CONSULTS:  IP CONSULT TO CARDIOLOGY    PROCEDURES:  Unless otherwise notedbelow, none     Procedures    FINAL IMPRESSION     1. Chest pain, unspecified type    2.  Palpitations          DISPOSITION/PLAN   DISPOSITION Admitted 03/12/2022 11:18:15 PM      PATIENT REFERRED TO:  @FUP@    DISCHARGE MEDICATIONS:  New Prescriptions    No medications on file          (Please note that portions of this note were completed with a voice recognition program.  Efforts were made to edit the dictations butoccasionally words are mis-transcribed.)    Elvira Townsend MD (electronically signed)  AttendingEmergency Physician         Rosi Turpin Manny Eastman MD  03/12/22 [facility]  03/12/22 2723

## 2022-03-14 ENCOUNTER — APPOINTMENT (OUTPATIENT)
Dept: NUCLEAR MEDICINE | Age: 77
DRG: 287 | End: 2022-03-14
Payer: MEDICARE

## 2022-03-14 VITALS
HEIGHT: 69 IN | RESPIRATION RATE: 16 BRPM | TEMPERATURE: 97.2 F | HEART RATE: 49 BPM | SYSTOLIC BLOOD PRESSURE: 119 MMHG | DIASTOLIC BLOOD PRESSURE: 50 MMHG | OXYGEN SATURATION: 97 % | BODY MASS INDEX: 38.72 KG/M2 | WEIGHT: 261.4 LBS

## 2022-03-14 LAB
EKG P AXIS: -13 DEGREES
EKG P AXIS: -35 DEGREES
EKG P AXIS: -8 DEGREES
EKG P AXIS: NORMAL DEGREES
EKG P-R INTERVAL: 134 MS
EKG P-R INTERVAL: 186 MS
EKG P-R INTERVAL: 212 MS
EKG P-R INTERVAL: NORMAL MS
EKG Q-T INTERVAL: 452 MS
EKG Q-T INTERVAL: 472 MS
EKG Q-T INTERVAL: 482 MS
EKG Q-T INTERVAL: 492 MS
EKG QRS DURATION: 106 MS
EKG QRS DURATION: 108 MS
EKG QRS DURATION: 114 MS
EKG QRS DURATION: 126 MS
EKG QTC CALCULATION (BAZETT): 431 MS
EKG QTC CALCULATION (BAZETT): 443 MS
EKG QTC CALCULATION (BAZETT): 454 MS
EKG QTC CALCULATION (BAZETT): 457 MS
EKG T AXIS: 119 DEGREES
EKG T AXIS: 127 DEGREES
EKG T AXIS: 132 DEGREES
EKG T AXIS: 143 DEGREES
LV EF: 55 %
LVEF MODALITY: NORMAL
TROPONIN: 0.01 NG/ML (ref 0–0.03)
TROPONIN: <0.01 NG/ML (ref 0–0.03)

## 2022-03-14 PROCEDURE — 99152 MOD SED SAME PHYS/QHP 5/>YRS: CPT

## 2022-03-14 PROCEDURE — G0378 HOSPITAL OBSERVATION PER HR: HCPCS

## 2022-03-14 PROCEDURE — 3430000000 HC RX DIAGNOSTIC RADIOPHARMACEUTICAL: Performed by: INTERNAL MEDICINE

## 2022-03-14 PROCEDURE — 2500000003 HC RX 250 WO HCPCS

## 2022-03-14 PROCEDURE — B2111ZZ FLUOROSCOPY OF MULTIPLE CORONARY ARTERIES USING LOW OSMOLAR CONTRAST: ICD-10-PCS | Performed by: INTERNAL MEDICINE

## 2022-03-14 PROCEDURE — 6360000002 HC RX W HCPCS

## 2022-03-14 PROCEDURE — 36415 COLL VENOUS BLD VENIPUNCTURE: CPT

## 2022-03-14 PROCEDURE — 93017 CV STRESS TEST TRACING ONLY: CPT

## 2022-03-14 PROCEDURE — 96376 TX/PRO/DX INJ SAME DRUG ADON: CPT

## 2022-03-14 PROCEDURE — 84484 ASSAY OF TROPONIN QUANT: CPT

## 2022-03-14 PROCEDURE — 99152 MOD SED SAME PHYS/QHP 5/>YRS: CPT | Performed by: INTERNAL MEDICINE

## 2022-03-14 PROCEDURE — 6360000004 HC RX CONTRAST MEDICATION: Performed by: INTERNAL MEDICINE

## 2022-03-14 PROCEDURE — 93454 CORONARY ARTERY ANGIO S&I: CPT | Performed by: INTERNAL MEDICINE

## 2022-03-14 PROCEDURE — 93246 EXT ECG>7D<15D RECORDING: CPT

## 2022-03-14 PROCEDURE — A9502 TC99M TETROFOSMIN: HCPCS | Performed by: INTERNAL MEDICINE

## 2022-03-14 PROCEDURE — 2709999900 HC NON-CHARGEABLE SUPPLY

## 2022-03-14 PROCEDURE — 6370000000 HC RX 637 (ALT 250 FOR IP): Performed by: INTERNAL MEDICINE

## 2022-03-14 PROCEDURE — 2580000003 HC RX 258: Performed by: INTERNAL MEDICINE

## 2022-03-14 PROCEDURE — C1769 GUIDE WIRE: HCPCS

## 2022-03-14 PROCEDURE — C1894 INTRO/SHEATH, NON-LASER: HCPCS

## 2022-03-14 PROCEDURE — 93458 L HRT ARTERY/VENTRICLE ANGIO: CPT

## 2022-03-14 PROCEDURE — 6360000002 HC RX W HCPCS: Performed by: INTERNAL MEDICINE

## 2022-03-14 PROCEDURE — 2500000003 HC RX 250 WO HCPCS: Performed by: INTERNAL MEDICINE

## 2022-03-14 RX ORDER — LEVOTHYROXINE SODIUM 0.03 MG/1
25 TABLET ORAL DAILY
Qty: 30 TABLET | Refills: 0 | Status: SHIPPED | OUTPATIENT
Start: 2022-03-15

## 2022-03-14 RX ADMIN — ASPIRIN 81 MG 81 MG: 81 TABLET ORAL at 09:47

## 2022-03-14 RX ADMIN — ALLOPURINOL 300 MG: 300 TABLET ORAL at 09:47

## 2022-03-14 RX ADMIN — REGADENOSON 0.4 MG: 0.08 INJECTION, SOLUTION INTRAVENOUS at 08:43

## 2022-03-14 RX ADMIN — TETROFOSMIN 24 MILLICURIE: 1.38 INJECTION, POWDER, LYOPHILIZED, FOR SOLUTION INTRAVENOUS at 09:22

## 2022-03-14 RX ADMIN — FAMOTIDINE 20 MG: 10 INJECTION, SOLUTION INTRAVENOUS at 09:47

## 2022-03-14 RX ADMIN — IOPAMIDOL 90 ML: 612 INJECTION, SOLUTION INTRAVENOUS at 16:07

## 2022-03-14 RX ADMIN — SODIUM CHLORIDE, PRESERVATIVE FREE 10 ML: 5 INJECTION INTRAVENOUS at 09:47

## 2022-03-14 RX ADMIN — TETROFOSMIN 8 MILLICURIE: 1.38 INJECTION, POWDER, LYOPHILIZED, FOR SOLUTION INTRAVENOUS at 09:22

## 2022-03-14 ASSESSMENT — ENCOUNTER SYMPTOMS
ABDOMINAL PAIN: 0
NAUSEA: 0
BACK PAIN: 0
DIARRHEA: 0
CHEST TIGHTNESS: 0
COLOR CHANGE: 0
SHORTNESS OF BREATH: 0
WHEEZING: 0
VOMITING: 0

## 2022-03-14 ASSESSMENT — PAIN SCALES - GENERAL: PAINLEVEL_OUTOF10: 0

## 2022-03-14 NOTE — DISCHARGE SUMMARY
[facility]  :  1945  MRN:  [id number]    Admit date:  3/12/2022  Discharge date:  3/14/22    Discharging Physician:  Dr. Albin Sommers Directive: Full Code    Consults: Gabino Roberson     Primary Care Physician:  Robert Robertson MD    Discharge Diagnoses:  Principal Problem:    Tachycardia induced cardiomyopathy ([facility] [address]  Active Problems:    CAD (coronary artery disease)    Hypertension    Family history of early CAD    Obesity    Aortic stenosis    Hyperlipemia    Diabetes mellitus ([facility] [address]    S/P AVR    History of coronary artery stent placement    S/P aortic valve replacement with bioprosthetic valve    Valvular heart disease    Chronic diastolic CHF (congestive heart failure) (HCC)    Chronic bilateral low back pain with right-sided sciatica    Paroxysmal A-fib ([facility] [address]  Resolved Problems:    * No resolved hospital problems. *      Portions of this note have been copied forward, however, changed to reflect the most current clinical status of this patient. Hospital Course: The patient is a 54-year-old male with a past medical history of CAD, aortic stenosis status post aortic valve replacement, CKD stage IV, diabetes, hypertension, hyperlipidemia, and GERD who presented to [facility] ED with complaints of tachycardia. The patient reported prior to admission having tachycardia, hypertension, and chest pain radiating to the jaw watching TV. Patient stated on normal basis his right runs in the mid 40s however his heart rate was as high as 140 at home. Patient denied prior history of atrial fibrillation or tachycardia in the past.  Patient had aortic valve replacement . Patient reports seeing Dr. Claudette Renya outpatient but has not seen him in a while. Patient was admitted to hospitalist service for tachycardia. Patient was noted to be sinus bradycardia on telemetry and metoprolol was held. Cardiology was consulted. Troponins remained flat.   Echo obtained and indicated prosthetic valve in the aortic position is functioning normally, EF estimated at 55 to 60%, mildly enlarged right atrium, and mildly dilated left atrium. Patient underwent Lexiscan and indicated possible small to moderate mid lateral ischemia with ejection fraction 55%. Cardiology recommended heart cath which was negative. TSH noted to be slightly elevated and patient started on low-dose Synthroid. Cardiology recommends discharging patient with Zio patch and outpatient follow-up. At this time patient is medically stable to be discharged home. Significant Diagnostic Studies:   XR CHEST PORTABLE    Result Date: 3/12/2022  EXAMINATION: Chest one view 3/12/2022 HISTORY: Chest pain FINDINGS: Today's exam is compared to previous study of 8/9/2016. Mild left basilar atelectasis is present. Lungs are otherwise clear. There is no effusion or free air present. The patient's undergone prior median sternotomy for coronary bypass grafting. 1.. Left basilar atelectasis. Lungs are otherwise clear. Signed by Dr Ana Manuel Date: 3/12/2022  CTA PULMONARY W CONTRAST 3/12/2022 9:05 PM HISTORY: Chest pain and tachycardia COMPARISON: 10/5/2016. DLP: 709 mGy cm. All CT scans are performed using dose optimization techniques as appropriate to the performed exam and include at least one of the following: Automated exposure control, adjustment of the mA and/or kV according to size, and the use of the iterative reconstruction technique. TECHNIQUE: Helical tomographic images of the chest were obtained after the administration of intravenous contrast following angiogram protocol. Additionally, 3D MIP reconstructions in the coronal and sagittal planes were provided. FINDINGS:  Pulmonary arteries: There is adequate enhancement of the pulmonary arteries to evaluate for central and segmental pulmonary emboli. There are no filling defects within the main, lobar, segmental or visualized subsegmental pulmonary arteries.  The pulmonary vessels are within normal limits. Aorta and great vessels: The thoracic aorta is well opacified without evidence of dissection. No evidence of focal aneurysm although the thoracic aorta and proximal great vessels are ectatic. . The great vessels are otherwise normal in appearance. Vel Case Heavy coronary calcifications are present. . Neck base: The imaged portion of the base of the neck appears unremarkable. Lungs: Bibasilar atelectasis is present. There is mild bronchial wall thickening within the lower lobes. No evidence of consolidative pneumonia or nodularity. . The trachea and bronchial tree are otherwise patent. Vel Case Heart: There is moderate cardiomegaly. Elevated right heart pressures suspected with reflux of contrast into the intrahepatic IVC. Vel Case There is no pericardial effusion. Lymph nodes: No pathologically enlarged mediastinal, hilar, or axillary lymph nodes are present. Bones and soft tissues: The patient's undergone previous median sternotomy. 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 No acute bony abnormality are present. Upper abdomen: There is mild steatosis of the liver. The adrenals are unremarkable. .     1. No evidence of pulmonary thromboembolic disease. 2. The thoracic aorta and great vessels are mildly ectatic. No evidence of focal aneurysm or dissection. 3. Cardiomegaly with coronary calcifications. No evidence of pericardial effusion. 4. Bibasilar atelectasis. There is mild bronchial wall thickening within the lower lobes. 5. Elevated right heart pressure is suspected with reflux of contrast into the intrahepatic IVC. Vel Case  Signed by Dr Paige Scott    ECHO 2D Crystal Bolster    Result Date: 3/13/2022  Transthoracic Echocardiography Report (TTE)  Demographics   Patient Name  Jame Heard Date of Study         03/13/2022   MRN           [id number]         Gender                Male   Date of Birth 1945     Room Number           MHL-0714   Age           68 year(s)   Height: 69 inches      Referring Physician   Heriberto Menjivar MD   Weight:       256.01 pounds  Sonographer           Chasity Morales, [facility]   BSA:          2.29 m^2       Interpreting          Robert Suárez MD                               Physician   BMI:          37.8 kg/m^2  Procedure Type of Study   TTE procedure:ECHO 2D W/DOPPLER/COLOR/CONTRAST. Study Location: Echo Lab Technical Quality: Adequate visualization Patient Status: Inpatient Contrast Medium: Definity. Amount - 4 ml HR: 39 bpm BP: 133/65 mmHg Indications:S/P AVR. Conclusions   Summary  Structurally normal mitral valve with mildly reduced leaflet mobility. No evidence of mitral valve stenosis, mild mitral regurgitation. Mitral annular calcification is present. bioprosthetic valve in the aortic position that is functioning normally. There is mild perivalvular leak  Mean gradient across the aortic valve is 22 and peak gradient is 55 mmHg  with velocity of 3.7 m/s  Tricuspid valve is structurally normal.  Mild tricuspid regurgitation with estimated RVSP of 33 mm Hg. The pulmonic valve was not well visualized. Mildly dilated left atrium. Normal left ventricular size with preserved LV function and an estimated  ejection fraction of approximately 55-60%. No evidence of left ventricular mass or thrombus noted. Mildly enlarged right atrium size. Grossly normal RV size and function  Aortic root is within normal limits. IVC normal.  No evidence of significant pericardial effusion is noted. Signature   ----------------------------------------------------------------  Electronically signed by Robert Suárez MD(Interpreting  physician) on 03/13/2022 03:10 PM  ----------------------------------------------------------------   Findings   Mitral Valve  Structurally normal mitral valve with mildly reduced leaflet mobility. No evidence of mitral valve stenosis, mild mitral regurgitation. Mitral annular calcification is present.    Aortic Valve  bioprosthetic valve in the aortic position that is functioning normally. There is mild perivalvular leak  Mean gradient across the aortic valve is 22 and peak gradient is 55 mmHg  with velocity of 3.7 m/s   Tricuspid Valve  Tricuspid valve is structurally normal.  Mild tricuspid regurgitation with estimated RVSP of 33 mm Hg. Pulmonic Valve  The pulmonic valve was not well visualized. Left Atrium  Mildly dilated left atrium. Left Ventricle  Normal left ventricular size with preserved LV function and an estimated  ejection fraction of approximately 55-60%. No evidence of left ventricular mass or thrombus noted. Right Atrium  Mildly enlarged right atrium size. Right Ventricle  Grossly normal RV size and function   Pericardial Effusion  No evidence of significant pericardial effusion is noted. Miscellaneous  Aortic root is within normal limits. IVC normal.  Allergies   - No known allergies. M-Mode Measurements (cm)   LVIDd: 6.59 cm                         LVIDs: 4.41 cm  IVSd: 1.57 cm  LVPWd: 1.31 cm                         AO Root Dimension: 1.6 cm  Rt. Vent.  Dimension: 3.34 cm           LA: 4.8 cm  % Ejection Fraction: 55 %              LVOT: 2.1 cm  Doppler Measurements:   AV Peak Velocity:369 cm/s            MV Peak E-Wave: 96.7 cm/s  AV Peak Gradient: 54.46 mmHg         MV Peak A-Wave: 43.5 cm/s  AV Mean Gradient: 22 mmHg            MV E/A Ratio: 2.22 %  AV Area (Continuity):1.54 cm^2       MV Peak Gradient: 3.74 mmHg  TR Velocity:263 cm/s  TR Gradient:27.67 mmHg  Estimated RAP:5 mmHg  RVSP:33 mmHg        Pertinent Labs:   CBC:   Recent Labs     03/12/22 2022 03/13/22  0047   WBC 6.0 5.8   HGB 13.4* 12.6*    149     BMP:    Recent Labs     03/12/22 2022 03/13/22  0047    142   K 4.2 3.8    105   CO2 28 26   BUN 23 23   CREATININE 1.4* 1.4*   GLUCOSE 102 138*     INR:   Recent Labs     03/12/22 2022   INR 0.89       Physical Exam:  Vital Signs: /82   Pulse (!) 42 Temp 97 °F (36.1 °C) (Temporal)   Resp 16   Ht 5' 9\" (1.753 m)   Wt 261 lb 6.4 oz (118.6 kg)   SpO2 99%   BMI 38.60 kg/m²   Physical Exam  Vitals reviewed. Constitutional:       Appearance: He is obese. He is not ill-appearing. HENT:      Head: Normocephalic. Mouth/Throat:      Mouth: Mucous membranes are moist.      Pharynx: Oropharynx is clear. Eyes:      Pupils: Pupils are equal, round, and reactive to light. Cardiovascular:      Rate and Rhythm: Normal rate and regular rhythm. Pulses: Normal pulses. Heart sounds: Murmur heard. Pulmonary:      Effort: Pulmonary effort is normal.      Breath sounds: Normal breath sounds. Abdominal:      General: Bowel sounds are normal. There is no distension. Palpations: Abdomen is soft. Tenderness: There is no abdominal tenderness. There is no guarding. Musculoskeletal:         General: Normal range of motion. Right lower leg: No edema. Left lower leg: No edema. Skin:     General: Skin is warm and dry. Capillary Refill: Capillary refill takes less than 2 seconds. Neurological:      General: No focal deficit present. Mental Status: He is alert and oriented to person, place, and time. Psychiatric:         Mood and Affect: Mood normal.         Behavior: Behavior normal.         Thought Content: Thought content normal.         Judgment: Judgment normal.         Discharge Medications:         Medication List      START taking these medications    levothyroxine 25 MCG tablet  Commonly known as: SYNTHROID  Take 1 tablet by mouth Daily  Start taking on: March 15, 2022        CHANGE how you take these medications    atorvastatin 40 MG tablet  Commonly known as: LIPITOR  Take 1 tablet by mouth daily.   What changed: when to take this        CONTINUE taking these medications    allopurinol 300 MG tablet  Commonly known as: ZYLOPRIM     aspirin 81 MG tablet     bumetanide 2 MG tablet  Commonly known as: BUMEX     CoQ10 200 MG Caps     gabapentin 300 MG capsule  Commonly known as: NEURONTIN     isosorbide mononitrate 30 MG extended release tablet  Commonly known as: IMDUR     omeprazole 20 MG delayed release capsule  Commonly known as: PRILOSEC     tamsulosin 0.4 MG capsule  Commonly known as: FLOMAX        STOP taking these medications    metoprolol tartrate 25 MG tablet  Commonly known as: LOPRESSOR           Where to Get Your Medications      These medications were sent to [address], [address] [facility]  [address]    Phone: [phone]   · levothyroxine 25 MCG tablet         Discharge Instructions: Follow up with Daryle Riddles, MD in 3-5 days. Take medications as directed. Resume activity as tolerated. Diet: ADULT DIET; Regular; Low Fat/Low Chol/High Fiber/2 gm Na     Disposition: Patient is Stableand will be discharged to Home. Time spent on discharge 38 minutes spent in assessing patient, reviewing medications, discussion with nursing, confirming safe discharge plan and preparation of discharge summary.     Signed:  CHERYL Blood CNP, 3/14/2022 4:36 PM

## 2022-03-14 NOTE — PROGRESS NOTES
[facility] Hospitalists      Patient:  Louie Carrillo  YOB: 1945  Date of Service: 3/14/2022  MRN: [id number]   Acct: [de-identified]   Primary Care Physician: Laveda Cogan, MD  Advance Directive: Full Code  Admit Date: 3/12/2022       Hospital Day: 2  Portions of this note have been copied forward, however, changed to reflect the most current clinical status of this patient. CHIEF COMPLAINT tachycardia    SUBJECTIVE: Patient has no complaints today. He denies shortness of breath and chest pain. CUMULATIVE HOSPITAL COURSE:  The patient is a 79-year-old male with a past medical history of CAD, aortic stenosis status post aortic valve replacement, CKD stage IV, diabetes, hypertension, hyperlipidemia, and GERD who presented to [facility] ED with complaints of tachycardia. The patient reported prior to admission having tachycardia, hypertension, and chest pain radiating to the jaw watching TV. Patient stated on normal basis his right runs in the mid 40s however his heart rate was as high as 140 at home. Patient denied prior history of atrial fibrillation or tachycardia in the past.  Patient had aortic valve replacement 2015. Patient reports seeing Dr. Josh Brenner outpatient but has not seen him in a while. Patient was admitted to hospitalist service for tachycardia. Patient was noted to be sinus bradycardia on telemetry and metoprolol was held. Cardiology was consulted. Troponins remained flat. Echo obtained and indicated prosthetic valve in the aortic position is functioning normally, EF estimated at 55 to 60%, mildly enlarged right atrium, and mildly dilated left atrium. Patient underwent Lexiscan and indicated possible small to moderate mid lateral ischemia with ejection fraction 55%. Cardiology plans for heart cath this afternoon. TSH noted to be slightly elevated and patient started on low-dose Synthroid.     Review of Systems:   Review of Systems   Constitutional: Negative for activity change, chills, fatigue and fever. Respiratory: Negative for chest tightness, shortness of breath and wheezing. Cardiovascular: Negative for chest pain, palpitations and leg swelling. Gastrointestinal: Negative for abdominal pain, diarrhea, nausea and vomiting. Genitourinary: Negative for dysuria, frequency and urgency. Musculoskeletal: Negative for back pain and myalgias. Skin: Negative for color change and wound. Neurological: Negative for dizziness, syncope, light-headedness and headaches. Psychiatric/Behavioral: Negative for agitation and confusion. 14 point review of systems is negative except as specifically addressed above. Objective:   VITALS:  /82   Pulse (!) 42   Temp 97 °F (36.1 °C) (Temporal)   Resp 16   Ht 5' 9\" (1.753 m)   Wt 261 lb 6.4 oz (118.6 kg)   SpO2 99%   BMI 38.60 kg/m²   24HR INTAKE/OUTPUT:    Intake/Output Summary (Last 24 hours) at 3/14/2022 1437  Last data filed at 3/13/2022 2140  Gross per 24 hour   Intake 240 ml   Output 1200 ml   Net -960 ml       Physical Exam  Vitals reviewed. Constitutional:       Appearance: He is obese. He is not ill-appearing. HENT:      Head: Normocephalic. Mouth/Throat:      Mouth: Mucous membranes are moist.      Pharynx: Oropharynx is clear. Eyes:      Pupils: Pupils are equal, round, and reactive to light. Cardiovascular:      Rate and Rhythm: Normal rate and regular rhythm. Pulses: Normal pulses. Heart sounds: Murmur heard. Pulmonary:      Effort: Pulmonary effort is normal.      Breath sounds: Normal breath sounds. Abdominal:      General: Bowel sounds are normal. There is no distension. Palpations: Abdomen is soft. Tenderness: There is no abdominal tenderness. There is no guarding. Musculoskeletal:         General: Normal range of motion. Right lower leg: No edema. Left lower leg: No edema. Skin:     General: Skin is warm and dry.       Capillary Refill: Capillary refill takes less than 2 seconds. Neurological:      General: No focal deficit present. Mental Status: He is alert and oriented to person, place, and time. Psychiatric:         Mood and Affect: Mood normal.         Behavior: Behavior normal.         Thought Content: Thought content normal.         Judgment: Judgment normal.             Medications:      sodium chloride        levothyroxine  25 mcg Oral Daily    enoxaparin  40 mg SubCUTAneous Daily    sodium chloride flush  5-40 mL IntraVENous 2 times per day    famotidine (PEPCID) injection  20 mg IntraVENous BID    allopurinol  300 mg Oral Daily    aspirin  81 mg Oral Daily    atorvastatin  40 mg Oral Every Other Day    gabapentin  300 mg Oral Nightly    [Held by provider] metoprolol tartrate  25 mg Oral BID     sodium chloride flush, sodium chloride, ondansetron **OR** ondansetron, polyethylene glycol, acetaminophen **OR** acetaminophen  Diet NPO     Lab and other Data:     Recent Labs     03/12/22 2022 03/13/22 0047   WBC 6.0 5.8   HGB 13.4* 12.6*    149     Recent Labs     03/12/22 2022 03/13/22  0047    142   K 4.2 3.8    105   CO2 28 26   BUN 23 23   CREATININE 1.4* 1.4*   GLUCOSE 102 138*     Recent Labs     03/12/22 2022 03/13/22  0047   AST 23 20   ALT 33 28   BILITOT 0.5 0.5   ALKPHOS 110 95     Troponin T:   Recent Labs     03/13/22  2303 03/14/22  0508 03/14/22  1120   TROPONINI 0.01 <0.01 0.01     Pro-BNP: No results for input(s): BNP in the last 72 hours. INR:   Recent Labs     03/12/22 2022   INR 0.89     UA:  Recent Labs     03/12/22  2355   COLORU YELLOW   PHUR 5.5   CLARITYU Clear   SPECGRAV 1.038   LEUKOCYTESUR Negative   UROBILINOGEN 0.2   BILIRUBINUR Negative   BLOODU Negative   GLUCOSEU Negative     A1C:   Recent Labs     03/13/22 0047   LABA1C 6.1*     ABG:No results for input(s): PHART, HKD6JWJ, PO2ART, XFD1NDE, BEART, HGBAE, Z7WOLBZH, CARBOXHGBART in the last 72 hours.     RAD:   XR CHEST PORTABLE    Result Date: 3/12/2022  1. . Left basilar atelectasis. Lungs are otherwise clear. Signed by Dr Nyasia Weeks    Result Date: 3/12/2022  1. No evidence of pulmonary thromboembolic disease. 2. The thoracic aorta and great vessels are mildly ectatic. No evidence of focal aneurysm or dissection. 3. Cardiomegaly with coronary calcifications. No evidence of pericardial effusion. 4. Bibasilar atelectasis. There is mild bronchial wall thickening within the lower lobes. 5. Elevated right heart pressure is suspected with reflux of contrast into the intrahepatic IVC. Meryle Sandman Signed by Dr Alec Grey    Result Date: 3/14/2022  Impression: There is possible small to moderate mid lateral ischemia, with a calculated ejection fraction of 55 %. Suggest: Clinical correlation is advised with further evaluation with cardiac catheterization if significant symptomatology. Signed by Dr Addy Swain       Echo:   Summary   Structurally normal mitral valve with mildly reduced leaflet mobility. No evidence of mitral valve stenosis, mild mitral regurgitation. Mitral annular calcification is present. bioprosthetic valve in the aortic position that is functioning normally. There is mild perivalvular leak   Mean gradient across the aortic valve is 22 and peak gradient is 55 mmHg   with velocity of 3.7 m/s   Tricuspid valve is structurally normal.   Mild tricuspid regurgitation with estimated RVSP of 33 mm Hg. The pulmonic valve was not well visualized. Mildly dilated left atrium. Normal left ventricular size with preserved LV function and an estimated   ejection fraction of approximately 55-60%. No evidence of left ventricular mass or thrombus noted. Mildly enlarged right atrium size. Grossly normal RV size and function   Aortic root is within normal limits. IVC normal.   No evidence of significant pericardial effusion is noted.          Assessment/Plan   Principal Problem:    Tachycardia induced cardiomyopathy (HCC) / Paroxysmal A-fib (HCC) / CAD with History of coronary artery stent placement / Chronic diastolic CHF (congestive heart failure) (HCC) / Hypertension   -Monitor on telemetry   -Patient running sinus bradycardia on telemetry   -Continue aspirin and Lipitor   -TSH slightly elevated at 5.69, added Synthroid    -Trended troponin remain negative   -Cardiology on board -   -Sekou Akbar today indicated possible mid lateral ischemia   -Plan for heart cath this afternoon   -Metoprolol held    Active Problems:   Aortic stenosis, Valvular heart disease, S/P AVR, S/P aortic valve replacement with bioprosthetic valve    -Echo completed, valve appears to be working appropriately       Obesity    -Noted      Hyperlipemia   -Continue current statin therapy      Diabetes mellitus    -A1c 6.1   -Carb controlled diet         Chronic bilateral low back pain with right-sided sciatica     -Noted      DVT Prophylaxis: Lovenox    GI prophylaxis: Tarsha Coleman, CHERYL - CNP, 3/14/2022 2:37 PM

## 2022-03-15 ENCOUNTER — CARE COORDINATION (OUTPATIENT)
Dept: CASE MANAGEMENT | Age: 77
End: 2022-03-15

## 2022-03-15 NOTE — CARE COORDINATION
Lisa 45 Transitions Initial Follow Up Call    Call within 2 business days of discharge: Yes    Patient: Naomi Morrison Patient : 1945   MRN: [id number]  Reason for Admission: Tachycardia Induced Cardiomyopathy, et al  Discharge Date: 3/14/22 RARS: Readmission Risk Score: 9.3 ( )      Last Discharge [facility]       Complaint Diagnosis Description Type Department Provider    3/12/22 Chest Pain; Tachycardia Chest pain, unspecified type . .. ED to Hosp-Admission (Discharged) (ADMITTED) L FLAVIO Kilgore MD; Graham Rangel. .. Spoke with: Mrs. Stephanie Howell, with patient nearby and audible. Also, Communication Release Verified    Facility: [address]    Non-face-to-face services provided:  Reviewed encounter information for continuity of care prior to follow up Care Transitions Coordination phone call - chart notes, consults, progress notes, test results, med list, appointments, AVS, other information. Care Transitions 24 Hour Call    Schedule Follow Up Appointment with PCP: Completed  Do you have any ongoing symptoms?: No  Do you have a copy of your discharge instructions?: Yes  Do you have all of your prescriptions and are they filled?: No  Have you been contacted by a [address]?: No  Have you scheduled your follow up appointment?: Yes  How are you going to get to your appointment?: Car - family or friend to transport  Were you discharged with any Home Care or Post Acute Services: No  Do you feel like you have everything you need to keep you well at home?: Yes  Care Transitions Interventions       Placed a call to the number listed for patient for an initial follow up call for Care Transitions Coordination following discharge from the hospital.  Introduced myself and explained the purpose of my call as well as verifying name, date of birth of patient. Spoke with patient's wife, with patient sitting nearby, regarding hospitalization, discharge and status thus far.   She reported that he had a good night's sleep after losing so much during hospital stay. She said he has been up for awhile this morning and seems to be doing well. She said he has had breakfast and appetite is improving. He did not check his blood sugar this morning. She said he does normally do that on a regular basis. She did say that he does do daily weights. She did not give any numbers, but said he keeps a log of it and takes it to the doctor when he goes. She said that he sees his PCP tomorrow. They will  his prescription while in [facility] for that. She said they are aware of stopping Metoprolol. He is wearing a Zio Patch and she is aware of sending it back in 14 days. No other issues noted or reported. Discussed COVID 19 information, signs and symptoms, testing, quarantine, risks to others, steps to help prevention, such as wearing masks, social distancing, proper handwashing and other infection control cleansing procedures, as well as vaccines, etc.  Wife aware and voices understanding. Reminded of the purpose of Care Transitions Coordination calls. Given the opportunity to ask questions and answered appropriately. Ensured to have CTN contact information to be used as needed. Encouraged to call for new/ongoing issues, questions, concerns, etc if CTN can be of assistance. Encouraged to make contact with PCP as indicated for any further needs as well. No further scheduled calls needed at this time. Transitions of Care Initial Call    Was this an external facility discharge? No    Challenges to be reviewed by the provider   Additional needs identified to be addressed with provider: No         Method of communication with provider : none    Advance Care Planning:   Does patient have an Advance Directive: not on file; education provided.      Advance Care Planning   Healthcare Decision Maker:  Patient does not have a current health care decision maker listed and is not able to name one at this time. Was this a readmission? No  Patients top risk factors for readmission: functional physical ability  medical condition-multiple cardiac issues. medication management    Care Transition Nurse (CTN) contacted the caregiver by telephone to perform post hospital discharge assessment. Verified name and  with caregiver as identifiers. Provided introduction to self, and explanation of the CTN role. CTN reviewed discharge instructions, medical action plan and red flags with caregiver who verbalized understanding. Caregiver given an opportunity to ask questions and does not have any further questions or concerns at this time. Were discharge instructions available to patient? Yes. Reviewed appropriate site of care based on symptoms and resources available to patient including: PCP, Specialist, Urgent care clinics, [facility], Home health, When to call 911 and [address]. The caregiver agrees to contact the PCP office for questions related to their healthcare. Medication reconciliation was not performed with caregiver, declined. She verbalizes understanding of administration of home medications. Advised obtaining a 90-day supply of all daily and as-needed medications. Covid Risk Education     Educated patient about risk for severe COVID-19 due to risk factors according to CDC guidelines. CTN reviewed discharge instructions, medical action plan and red flag symptoms with the caregiver who verbalized understanding. Discussed COVID vaccination status: Yes. Education provided on COVID-19 vaccination as appropriate. Discussed exposure protocols and quarantine with CDC Guidelines. Caregiver was given an opportunity to verbalize any questions and concerns and agrees to contact CTN or health care provider for questions related to their healthcare. CTN provided contact information. No further follow-up call indicated based on severity of symptoms and risk factors.     Follow Up  No future appointments.     Papito Lance RN

## 2022-03-15 NOTE — MANAGEMENT PLAN
Heart catheterization was performed today via right radial approach with no complications  Coronary angiogram showed nonobstructive coronary artery disease  Echo showed normally functioning aortic bioprosthetic valve with mild perivalvular leak    Patient can be discharged home after postoperative routine care with TR band    We will discontinue beta-blocker due to significant bradycardia  Patient will be discharged home on medical therapy including aspirin statin Bumex and Imdur, recommend Zio patch for monitoring for his bradycardia and rule out any indication for pacemaker in the future    Patient will follow up with us in the office after 4 weeks    Discussed the plan of care with the patient and his wife and nursing staff      Napoleon Mendosa MD, [facility]  Interventional Cardiologist, Endovascular Specialist   Medical Director, Structural Heart Program   [facility]

## 2022-03-31 PROCEDURE — 93248 EXT ECG>7D<15D REV&INTERPJ: CPT | Performed by: NURSE PRACTITIONER

## 2022-03-31 NOTE — PROCEDURES
[facility] Monitor Report      Naomi Morrison  [id number]    : 1945      Indications: Bradycardia      Test Date:  2022    Analysis Date:  2022    Date Interpreted: 3/31/2022        Nearly 13 days and 13 hours of rhythm are processed. 1.   Sinus  BPM (Symptomatic bradycardia)    2. VT 4 runs, longest 19 beats (ave 145 BPM)    3. SVT 11 runs, longest 13 beats (91BPM)    4.    Occasional VE (1.3 %)     (Copied from Dr. Bernardo Izquierdo handwritten report on ZIO strips)    Electronically signed by CHERYL Durham on 3/31/22

## 2022-04-04 ENCOUNTER — TELEPHONE (OUTPATIENT)
Dept: CARDIOLOGY CLINIC | Age: 77
End: 2022-04-04

## 2022-04-04 ENCOUNTER — OFFICE VISIT (OUTPATIENT)
Dept: CARDIOLOGY CLINIC | Age: 77
End: 2022-04-04
Payer: MEDICARE

## 2022-04-04 VITALS
HEART RATE: 66 BPM | DIASTOLIC BLOOD PRESSURE: 74 MMHG | OXYGEN SATURATION: 97 % | SYSTOLIC BLOOD PRESSURE: 124 MMHG | WEIGHT: 257 LBS | BODY MASS INDEX: 36.79 KG/M2 | HEIGHT: 70 IN

## 2022-04-04 DIAGNOSIS — R00.1 BRADYCARDIA: ICD-10-CM

## 2022-04-04 DIAGNOSIS — E78.5 DYSLIPIDEMIA: ICD-10-CM

## 2022-04-04 DIAGNOSIS — I25.10 CORONARY ARTERY DISEASE INVOLVING NATIVE CORONARY ARTERY OF NATIVE HEART WITHOUT ANGINA PECTORIS: Primary | ICD-10-CM

## 2022-04-04 DIAGNOSIS — I10 ESSENTIAL HYPERTENSION: ICD-10-CM

## 2022-04-04 PROCEDURE — 99214 OFFICE O/P EST MOD 30 MIN: CPT | Performed by: NURSE PRACTITIONER

## 2022-04-04 RX ORDER — AMLODIPINE BESYLATE 5 MG/1
5 TABLET ORAL DAILY
COMMUNITY

## 2022-04-04 RX ORDER — FOLIC ACID/VIT B COMPLEX AND C 5 MG
1 TABLET ORAL DAILY
COMMUNITY

## 2022-04-04 ASSESSMENT — ENCOUNTER SYMPTOMS
COUGH: 0
CHEST TIGHTNESS: 0
SORE THROAT: 0
SHORTNESS OF BREATH: 0
WHEEZING: 0

## 2022-04-04 NOTE — PROGRESS NOTES
[facility]  [address]  [phone]      Chief Complaint / Reason for Being Seen: Hospital follow-up. 1. Coronary artery disease involving native coronary artery of native heart without angina pectoris    2. Essential hypertension    3. Dyslipidemia    4. Bradycardia        Patient with a history of CAD, aortic stenosis status post aortic valve replacement 2015, chronic kidney disease stage IV, diabetes, hypertension, hyperlipidemia and GERD. Patient presented to the emergency department on 3/12/2022 for complaints of tachycardia.,  Hypertension and chest pain. Work-up included troponins which remained flat. 2D echo showed that the prostatic valve in the aortic position was functionally normally with an EF of 55 to 60%. Mildly enlarged right atrium. Mildly dilated left atrium. Hettie Cuming showed possible small to moderate mid lateral ischemia with ejection fraction of 55%. Patient did undergo cardiac catheterization which showed: Patient tolerated the procedure well. Non obstructive CAD. Recommendations:   Medical management. Aggressive risk factor management. Cardiology did recommend discharging patient with a ZIO Patch in place. ZIO Patch showed sinus rhythm with a minimum heart rate of 30 bpm, maximum 129 bpm, averaging 60 bpm.  Patient did have trigger markers showing symptomatic bradycardia. Did have 4 VT runs longest being 19 beats at an average rate of 145 bpm.  There are 11 SVT runs longest being 13 beats at a rate of 91 bpm.  Occasional isolated PVCs 1.3%. Patient presents to clinic today for hospital follow-up. Denies any chest pain, pressure or tightness. There is no shortness of breath, orthopnea or PND. Patient is having some lightheadedness and dizzy notes but no syncope. Feeling very fatigued. Subjective: Old records have been obtained from the referring providers. Those records have been reviewed and summarized. Felix Aguayo is a 68 y.o. male with the following history as recorded in [facility]:  Patient Active Problem List    Diagnosis Date Noted    Chest pain     CAD (coronary artery disease)     Paroxysmal A-fib ([facility] 03/13/2022    Tachycardia induced cardiomyopathy ([facility] 03/12/2022    Chronic bilateral low back pain with right-sided sciatica 01/16/2020    Valvular heart disease 03/26/2019    Chronic diastolic CHF (congestive heart failure) ([facility] 03/26/2019    S/P aortic valve replacement with bioprosthetic valve 07/24/2017    S/P AVR 08/09/2016    History of coronary artery stent placement 08/09/2016    Hyperlipemia     Diabetes mellitus ([facility]     Aortic stenosis     Obesity 05/21/2013    Hypertension 03/23/2012    Family history of early CAD 03/23/2012     Current Outpatient Medications   Medication Sig Dispense Refill    folbee plus (FOLBEE PLUS) TABS Take 1 tablet by mouth daily      Niacin (VITAMIN B-3 PO) Take by mouth      amLODIPine (NORVASC) 5 MG tablet Take 5 mg by mouth daily IF /80      levothyroxine (SYNTHROID) 25 MCG tablet Take 1 tablet by mouth Daily 30 tablet 0    tamsulosin (FLOMAX) 0.4 MG capsule Take 0.4 mg by mouth daily      allopurinol (ZYLOPRIM) 300 MG tablet Take 300 mg by mouth daily      isosorbide mononitrate (IMDUR) 30 MG extended release tablet Take 30 mg by mouth daily      Coenzyme Q10 (COQ10) 200 MG CAPS Take 200 mg by mouth nightly       bumetanide (BUMEX) 2 MG tablet 2 mg 2 times daily       aspirin 81 MG tablet Take 81 mg by mouth daily      atorvastatin (LIPITOR) 40 MG tablet Take 1 tablet by mouth daily. (Patient taking differently: Take 40 mg by mouth every other day ) 30 tablet 5    omeprazole (PRILOSEC) 20 MG capsule Take 20 mg by mouth daily.  gabapentin (NEURONTIN) 300 MG capsule Take 300 mg by mouth nightly        No current facility-administered medications for this visit. Allergies: Patient has no known allergies.   Past Medical History:   Diagnosis Date    Aortic stenosis     moderate    CAD (coronary artery disease)     stent to Circ    Chest tightness, discomfort, or pressure 3/23/2012    Chronic kidney disease     Stage 4    Diabetes mellitus ([address]     Family history of early CAD 3/23/2012    GERD (gastroesophageal reflux disease)     Hyperlipemia     Hypertension 3/23/2012    Obesity     Unspecified sleep apnea      Past Surgical History:   Procedure Laterality Date    AORTIC VALVE REPLACEMENT  07/21/15    Tissue Valve, Dr. Irvin Ramírez  3/26/12    CARDIAC CATHETERIZATION  2013  JDT    with stent to Circ- EF 50%    CARDIAC CATHETERIZATION  13  JDT    EF 60%, no intervention    CARDIAC CATHETERIZATION  7/20/15  JDT    severe aortic insufficiency. EF 50%    THORACENTESIS Left 08/04/15    VASCULAR SURGERY  13 [facility] & [address] R superficial femoral artery and psuedoaneurysm. Thrombin injection R SFA pseudoaneurysm. Family History   Problem Relation Age of Onset    Heart Disease Other     High Blood Pressure Other      Social History     Tobacco Use    Smoking status: Former Smoker     Packs/day: 0.00     Years: 0.00     Pack years: 0.00     Quit date: 1985     Years since quittin.8    Smokeless tobacco: Never Used   Substance Use Topics    Alcohol use: No          Review of System:    Review of Systems   Constitutional: Positive for fatigue. Negative for activity change, chills, diaphoresis and fever. HENT: Negative for congestion and sore throat. Respiratory: Negative for cough, chest tightness, shortness of breath and wheezing. Cardiovascular: Negative for chest pain, palpitations and leg swelling. Neurological: Positive for dizziness and light-headedness. Negative for syncope and headaches. Psychiatric/Behavioral: Negative for confusion. The patient is not nervous/anxious.          Objective:    /74   Pulse 66   Ht 5' 10\" (1.778 m)   Wt 257 lb (116.6 kg)   SpO2 97%   BMI 36.88 kg/m²     GENERAL - well developed and well nourished    HEENT -  PERRLA, Hearing appears normal, conjunctive and lids are normal.  External inspection of ears and nose appear normal.  NECK - no thyromegaly, no JVD, trachea is in the midline  CARDIOVASCULAR - PMI is in the mid line clavicular position, Normal S1 and S2 with no systolic murmur. No S3 or S4    PULMONARY - no respiratory distress. No wheezes or rales. Lungs are clear to ausculation, normal respiratory effort. ABDOMEN  - soft, non tender, no rebound  MUSCULOSKELETAL  - range of motion of the upper and lower extermites appears normal and equal and is without pain   EXTREMITIES - no significant edema   NEUROLOGIC - gait and station are normal  SKIN - turgor is normal, skin warm and dry. PSYCHIATRIC - normal mood and affect, alert and orientated x 3,      ASSESSMENT:    ALL THE CARDIOLOGY PROBLEMS ARE LISTED ABOVE; HOWEVER, THE FOLLOWING SPECIFIC CARDIAC PROBLEMS / CONDITIONS WERE ADDRESSED AND TREATED DURING THE OFFICE VISIT TODAY:       Cardiac Specific Problem  Discussion and Plan         1. Severe bradycardia  initial encounter   ZIO Patch showing severe bradycardia during wake hours of 30 bpm symptom triggered by patient. Patient is not on a beta-blocker. We will schedule implantation of permanent pacemaker with Dr. Jorge Reynolds. 2.   CAD  initial encounter   No chest pain. Patient is on Lipitor, aspirin, Imdur. Continue present medical management         3. Hypertension  initial encounter   Blood pressure in the office today 124/74. O2 sat 97%. Patient is on Norvasc 5 mg daily. PLAN:    No orders of the defined types were placed in this encounter. No orders of the defined types were placed in this encounter. Return for follow up post PPM. . Discussed with patient and spouse.     I greatly appreciate the opportunity to meet Keiko Escobedo and your confidence in allowing me to participate in his cardiovascular care. CHERYL Pabon NP 4/4/2022 1:53 PM CDT                    This dictation was generated by voice recognition computer software. Although all attempts are made to edit dictation for accuracy, there may be errors in the transcription that are not intended.

## 2022-04-04 NOTE — TELEPHONE ENCOUNTER
Called and spoke with patient, have Pacemaker scheduled for 4/13/22 with a tentative time of 930 with arrival of 730. Advised we will contact patient if time/date changes. Patient is to be NPO after midnight. Patient instructed to arrive through front entrance of hospital and make immediate left. Patient advised to have someone with them to drive them home. Patient advised may take morning medications with sip of water. Patient does not have IV dye allergy. Patient has been screened for covid symptoms and instructed should any symptoms occur or have any contact with anyone that tests positive to please call and let us know so we can reschedule them. Patient verbally understood.

## 2022-04-04 NOTE — TELEPHONE ENCOUNTER
----- Message from CHERYL Sun NP sent at 4/4/2022  2:18 PM CDT -----  Please schedule patient for permanent pacemaker with Dr. Julia Pederson. Former Dr. Allison Colby patient.

## 2022-04-04 NOTE — PATIENT INSTRUCTIONS
[place] at the [facility] and [address] [facility] located on the first floor of [place] [address] through hospital main entrance and turn immediately to your left. Patient's contact number:  [phone] (home)     Preoperative work-up:  CBC, BMP and Chest x-ray. (preoperative cardiovascular exam)    Pacemaker (overnight)         Definition   This is a procedure to insert an artificial pacemaker. A pacemaker is a small, battery-operated device. It helps maintain a normal heartbeat by sending electrical impulses to the heart. · This procedure requires an overnight stay at the hospital.     · Do not eat or drink anything after midnight the night before your procedure. This helps to prevent nausea. You may have small sips of water with your morning medications. · Make arrangements with a friend or family member to drive you to and from the hospital.  You will not be permitted to drive home after the procedure, since you may be sedated. · Follow up appointment will be made following your procedure. You will be unable to raise your arm up over your head for three days. _________________________________________    Pacemaker Placement: What to Expect at [address] [place] [address]     Pacemaker placement is surgery to put a pacemaker in your chest. A pacemaker is a small, battery-powered device that sends electrical signals to the heart to keep the heartbeat steady. Thin wires, called leads, carry the signals from the pacemaker to the heart. A pacemaker can prevent or reduce dizziness, fainting, and shortness of breath caused by a slow or unsteady heartbeat. Your chest may be sore where the doctor made the cut (incision) and put in the pacemaker. You also may have a bruise and mild swelling. These symptoms usually get better in 1 to 2 weeks. You may feel a hard ridge along the incision. This usually gets softer in the months after surgery.  You may be able to see or feel the outline of the pacemaker under your skin. You will probably be able to go back to work or your usual routine 1 to 2 weeks after surgery. Pacemaker batteries usually last 5 to 15 years. Your doctor will talk to you about how often you will need to have your pacemaker checked. When you have a pacemaker, it is important to avoid electrical devices that can stop your pacemaker from working right. Check with your doctor about what you need to stay away from, what you need to use with care, and what is okay to use. You will need to stay away from things with strong magnetic and electrical fields such as an MRI machine (unless your pacemaker is safe for an MRI), welding equipment, and power generators. You can use a cell phone, but keep it at least 6 inches away from your pacemaker. You can safely use most household and office electronics such as kitchen appliances, electric power tools, and computers. This care sheet gives you a general idea about how long it will take for you to recover. But each person recovers at a different pace. Follow the steps below to get better as quickly as possible. If for any reason you are unable to keep this appointment, please contact [facility], [phone], as soon as possible to reschedule.

## 2022-04-07 ENCOUNTER — TELEPHONE (OUTPATIENT)
Dept: CARDIOLOGY CLINIC | Age: 77
End: 2022-04-07

## 2022-04-07 NOTE — TELEPHONE ENCOUNTER
Date: 4-28-22    Cardiologist: Dr. Reena Mckeon    Procedure: Colonoscopy    Surgeon: Dr. Bony Shelton    Last Office Visit: 4-4-22    Reason for office visit and medical concerns addressed at this office visit: CAD, HTN, Dyslipidemia, bradycardia, CHF    Testing Performed and Date of Service:  Heart cath 3-12-22     RCRI = 2 pts, elevated, 6.6%   METs 4    Current Medications: niacin, norvasc, synthroid, flomax, zyloprim, imdur, q 10, bumex, ASA, lipitor, prilosec, neurontin    Is the patient currently taking an anticoagulant? If so, what is the diagnosis the patient has been given to warrant the need for the anticoagulant?  ASA    Additional Notes: Cardiac Risk Request and med hold on ASA

## 2022-04-13 ENCOUNTER — APPOINTMENT (OUTPATIENT)
Dept: GENERAL RADIOLOGY | Age: 77
End: 2022-04-13
Attending: INTERNAL MEDICINE
Payer: MEDICARE

## 2022-04-13 ENCOUNTER — ANESTHESIA EVENT (OUTPATIENT)
Dept: CARDIAC CATH/INVASIVE PROCEDURES | Age: 77
End: 2022-04-13
Payer: MEDICARE

## 2022-04-13 ENCOUNTER — ANESTHESIA (OUTPATIENT)
Dept: CARDIAC CATH/INVASIVE PROCEDURES | Age: 77
End: 2022-04-13
Payer: MEDICARE

## 2022-04-13 ENCOUNTER — HOSPITAL ENCOUNTER (OUTPATIENT)
Dept: CARDIAC CATH/INVASIVE PROCEDURES | Age: 77
Discharge: HOME | End: 2022-04-14
Attending: INTERNAL MEDICINE | Admitting: INTERNAL MEDICINE
Payer: MEDICARE

## 2022-04-13 DIAGNOSIS — Z95.0 PACEMAKER: Primary | ICD-10-CM

## 2022-04-13 PROBLEM — R00.1 SYMPTOMATIC BRADYCARDIA: Status: ACTIVE | Noted: 2022-04-13

## 2022-04-13 LAB
ALBUMIN SERPL-MCNC: 4.4 G/DL (ref 3.5–5.2)
ALP BLD-CCNC: 120 U/L (ref 40–130)
ALT SERPL-CCNC: 30 U/L (ref 5–41)
ANION GAP SERPL CALCULATED.3IONS-SCNC: 14 MMOL/L (ref 7–19)
AST SERPL-CCNC: 21 U/L (ref 5–40)
BILIRUB SERPL-MCNC: 0.4 MG/DL (ref 0.2–1.2)
BUN BLDV-MCNC: 32 MG/DL (ref 8–23)
CALCIUM SERPL-MCNC: 9.5 MG/DL (ref 8.8–10.2)
CHLORIDE BLD-SCNC: 102 MMOL/L (ref 98–111)
CO2: 25 MMOL/L (ref 22–29)
CREAT SERPL-MCNC: 1.5 MG/DL (ref 0.5–1.2)
GFR AFRICAN AMERICAN: 55
GFR NON-AFRICAN AMERICAN: 45
GLUCOSE BLD-MCNC: 125 MG/DL (ref 74–109)
HCT VFR BLD CALC: 44.3 % (ref 42–52)
HEMOGLOBIN: 14.5 G/DL (ref 14–18)
MCH RBC QN AUTO: 31.3 PG (ref 27–31)
MCHC RBC AUTO-ENTMCNC: 32.7 G/DL (ref 33–37)
MCV RBC AUTO: 95.7 FL (ref 80–94)
PDW BLD-RTO: 13.4 % (ref 11.5–14.5)
PLATELET # BLD: 195 K/UL (ref 130–400)
PMV BLD AUTO: 10.1 FL (ref 9.4–12.4)
POTASSIUM REFLEX MAGNESIUM: 4.2 MMOL/L (ref 3.5–5)
RBC # BLD: 4.63 M/UL (ref 4.7–6.1)
SODIUM BLD-SCNC: 141 MMOL/L (ref 136–145)
TESTOSTERONE TOTAL: 303.2 NG/DL (ref 193–740)
TOTAL PROTEIN: 6.8 G/DL (ref 6.6–8.7)
VITAMIN D 25-HYDROXY: 43.3 NG/ML
WBC # BLD: 7.1 K/UL (ref 4.8–10.8)

## 2022-04-13 PROCEDURE — 2500000003 HC RX 250 WO HCPCS

## 2022-04-13 PROCEDURE — 2580000003 HC RX 258: Performed by: ANESTHESIOLOGY

## 2022-04-13 PROCEDURE — 82306 VITAMIN D 25 HYDROXY: CPT

## 2022-04-13 PROCEDURE — C1889 IMPLANT/INSERT DEVICE, NOC: HCPCS

## 2022-04-13 PROCEDURE — 80053 COMPREHEN METABOLIC PANEL: CPT

## 2022-04-13 PROCEDURE — 2580000003 HC RX 258: Performed by: INTERNAL MEDICINE

## 2022-04-13 PROCEDURE — 33208 INSRT HEART PM ATRIAL & VENT: CPT | Performed by: INTERNAL MEDICINE

## 2022-04-13 PROCEDURE — 99152 MOD SED SAME PHYS/QHP 5/>YRS: CPT | Performed by: INTERNAL MEDICINE

## 2022-04-13 PROCEDURE — 93971 EXTREMITY STUDY: CPT

## 2022-04-13 PROCEDURE — 99024 POSTOP FOLLOW-UP VISIT: CPT | Performed by: INTERNAL MEDICINE

## 2022-04-13 PROCEDURE — 33208 INSRT HEART PM ATRIAL & VENT: CPT

## 2022-04-13 PROCEDURE — 36415 COLL VENOUS BLD VENIPUNCTURE: CPT

## 2022-04-13 PROCEDURE — 6360000002 HC RX W HCPCS: Performed by: ANESTHESIOLOGY

## 2022-04-13 PROCEDURE — 85027 COMPLETE CBC AUTOMATED: CPT

## 2022-04-13 PROCEDURE — 84403 ASSAY OF TOTAL TESTOSTERONE: CPT

## 2022-04-13 PROCEDURE — 2500000003 HC RX 250 WO HCPCS: Performed by: ANESTHESIOLOGY

## 2022-04-13 PROCEDURE — 2709999900 HC NON-CHARGEABLE SUPPLY

## 2022-04-13 PROCEDURE — 99152 MOD SED SAME PHYS/QHP 5/>YRS: CPT

## 2022-04-13 PROCEDURE — 71045 X-RAY EXAM CHEST 1 VIEW: CPT

## 2022-04-13 PROCEDURE — A4216 STERILE WATER/SALINE, 10 ML: HCPCS | Performed by: ANESTHESIOLOGY

## 2022-04-13 PROCEDURE — 93005 ELECTROCARDIOGRAM TRACING: CPT | Performed by: INTERNAL MEDICINE

## 2022-04-13 PROCEDURE — 6360000002 HC RX W HCPCS

## 2022-04-13 PROCEDURE — 6370000000 HC RX 637 (ALT 250 FOR IP): Performed by: INTERNAL MEDICINE

## 2022-04-13 PROCEDURE — C1898 LEAD, PMKR, OTHER THAN TRANS: HCPCS

## 2022-04-13 PROCEDURE — 99153 MOD SED SAME PHYS/QHP EA: CPT

## 2022-04-13 PROCEDURE — C1785 PMKR, DUAL, RATE-RESP: HCPCS

## 2022-04-13 PROCEDURE — C1769 GUIDE WIRE: HCPCS

## 2022-04-13 RX ORDER — HYDROMORPHONE HYDROCHLORIDE 1 MG/ML
0.25 INJECTION, SOLUTION INTRAMUSCULAR; INTRAVENOUS; SUBCUTANEOUS EVERY 5 MIN PRN
Status: DISCONTINUED | OUTPATIENT
Start: 2022-04-13 | End: 2022-04-14 | Stop reason: HOSPADM

## 2022-04-13 RX ORDER — BUMETANIDE 1 MG/1
1 TABLET ORAL 2 TIMES DAILY
Status: DISCONTINUED | OUTPATIENT
Start: 2022-04-13 | End: 2022-04-14 | Stop reason: HOSPADM

## 2022-04-13 RX ORDER — HYDROMORPHONE HYDROCHLORIDE 1 MG/ML
0.5 INJECTION, SOLUTION INTRAMUSCULAR; INTRAVENOUS; SUBCUTANEOUS EVERY 5 MIN PRN
Status: DISCONTINUED | OUTPATIENT
Start: 2022-04-13 | End: 2022-04-14 | Stop reason: HOSPADM

## 2022-04-13 RX ORDER — GABAPENTIN 300 MG/1
300 CAPSULE ORAL NIGHTLY
Status: DISCONTINUED | OUTPATIENT
Start: 2022-04-13 | End: 2022-04-14 | Stop reason: HOSPADM

## 2022-04-13 RX ORDER — FOLIC ACID/VIT B COMPLEX AND C 5 MG
1 TABLET ORAL DAILY
Status: DISCONTINUED | OUTPATIENT
Start: 2022-04-13 | End: 2022-04-13 | Stop reason: CLARIF

## 2022-04-13 RX ORDER — ATORVASTATIN CALCIUM 40 MG/1
40 TABLET, FILM COATED ORAL
Status: DISCONTINUED | OUTPATIENT
Start: 2022-04-13 | End: 2022-04-14 | Stop reason: HOSPADM

## 2022-04-13 RX ORDER — MV-MIN/FA/VIT K/LUTEIN/ZEAXANT 200MCG-5MG
1 CAPSULE ORAL 2 TIMES DAILY
Status: DISCONTINUED | OUTPATIENT
Start: 2022-04-13 | End: 2022-04-13 | Stop reason: CLARIF

## 2022-04-13 RX ORDER — LEVOTHYROXINE SODIUM 0.03 MG/1
25 TABLET ORAL DAILY
Status: DISCONTINUED | OUTPATIENT
Start: 2022-04-14 | End: 2022-04-14 | Stop reason: HOSPADM

## 2022-04-13 RX ORDER — ACETAMINOPHEN AND CODEINE PHOSPHATE 300; 30 MG/1; MG/1
1 TABLET ORAL EVERY 4 HOURS PRN
Status: DISCONTINUED | OUTPATIENT
Start: 2022-04-13 | End: 2022-04-14 | Stop reason: HOSPADM

## 2022-04-13 RX ORDER — M-VIT,TX,IRON,MINS/CALC/FOLIC 27MG-0.4MG
1 TABLET ORAL DAILY
Status: DISCONTINUED | OUTPATIENT
Start: 2022-04-13 | End: 2022-04-14 | Stop reason: HOSPADM

## 2022-04-13 RX ORDER — AMLODIPINE BESYLATE 5 MG/1
5 TABLET ORAL DAILY
Status: DISCONTINUED | OUTPATIENT
Start: 2022-04-13 | End: 2022-04-14 | Stop reason: HOSPADM

## 2022-04-13 RX ORDER — ONDANSETRON 2 MG/ML
4 INJECTION INTRAMUSCULAR; INTRAVENOUS
Status: ACTIVE | OUTPATIENT
Start: 2022-04-13 | End: 2022-04-13

## 2022-04-13 RX ORDER — CYANOCOBALAMIN/FOLIC AC/VIT B6 1-2.5-25MG
TABLET ORAL
Status: ON HOLD | COMMUNITY
Start: 2021-08-02 | End: 2022-04-13

## 2022-04-13 RX ORDER — PANTOPRAZOLE SODIUM 40 MG/1
40 TABLET, DELAYED RELEASE ORAL
Status: DISCONTINUED | OUTPATIENT
Start: 2022-04-14 | End: 2022-04-14 | Stop reason: HOSPADM

## 2022-04-13 RX ORDER — SODIUM CHLORIDE 9 MG/ML
INJECTION, SOLUTION INTRAVENOUS CONTINUOUS
Status: DISCONTINUED | OUTPATIENT
Start: 2022-04-13 | End: 2022-04-14 | Stop reason: HOSPADM

## 2022-04-13 RX ORDER — SPIRONOLACTONE 25 MG/1
25 TABLET ORAL 2 TIMES DAILY
Status: DISCONTINUED | OUTPATIENT
Start: 2022-04-13 | End: 2022-04-14 | Stop reason: HOSPADM

## 2022-04-13 RX ORDER — VITAMIN C
1 TAB ORAL DAILY
Status: DISCONTINUED | OUTPATIENT
Start: 2022-04-13 | End: 2022-04-14 | Stop reason: HOSPADM

## 2022-04-13 RX ORDER — FLUTICASONE PROPIONATE 50 MCG
1 SPRAY, SUSPENSION (ML) NASAL DAILY PRN
COMMUNITY
End: 2022-05-25

## 2022-04-13 RX ORDER — MV-MIN/FA/VIT K/LUTEIN/ZEAXANT 200MCG-5MG
1 CAPSULE ORAL 2 TIMES DAILY
COMMUNITY

## 2022-04-13 RX ORDER — NITROGLYCERIN 0.4 MG/1
0.4 TABLET SUBLINGUAL EVERY 5 MIN PRN
Status: DISCONTINUED | OUTPATIENT
Start: 2022-04-13 | End: 2022-04-14 | Stop reason: HOSPADM

## 2022-04-13 RX ORDER — TAMSULOSIN HYDROCHLORIDE 0.4 MG/1
0.4 CAPSULE ORAL NIGHTLY PRN
COMMUNITY

## 2022-04-13 RX ORDER — UBIDECARENONE 100 MG
200 CAPSULE ORAL 2 TIMES DAILY
Status: DISCONTINUED | OUTPATIENT
Start: 2022-04-13 | End: 2022-04-14 | Stop reason: HOSPADM

## 2022-04-13 RX ORDER — ASPIRIN 81 MG/1
81 TABLET ORAL NIGHTLY
Status: DISCONTINUED | OUTPATIENT
Start: 2022-04-13 | End: 2022-04-14 | Stop reason: HOSPADM

## 2022-04-13 RX ORDER — FLUTICASONE PROPIONATE 50 MCG
1 SPRAY, SUSPENSION (ML) NASAL DAILY PRN
Status: DISCONTINUED | OUTPATIENT
Start: 2022-04-13 | End: 2022-04-14 | Stop reason: HOSPADM

## 2022-04-13 RX ORDER — SPIRONOLACTONE 25 MG/1
25 TABLET ORAL 2 TIMES DAILY
COMMUNITY

## 2022-04-13 RX ORDER — AMPICILLIN TRIHYDRATE 250 MG
200 CAPSULE ORAL 2 TIMES DAILY
Status: DISCONTINUED | OUTPATIENT
Start: 2022-04-13 | End: 2022-04-13 | Stop reason: SDUPTHER

## 2022-04-13 RX ORDER — ALLOPURINOL 300 MG/1
300 TABLET ORAL DAILY
Status: DISCONTINUED | OUTPATIENT
Start: 2022-04-13 | End: 2022-04-14 | Stop reason: HOSPADM

## 2022-04-13 RX ORDER — ISOSORBIDE MONONITRATE 30 MG/1
30 TABLET, EXTENDED RELEASE ORAL DAILY
Status: DISCONTINUED | OUTPATIENT
Start: 2022-04-13 | End: 2022-04-14 | Stop reason: HOSPADM

## 2022-04-13 RX ORDER — NITROGLYCERIN 0.4 MG/1
0.4 TABLET SUBLINGUAL EVERY 5 MIN PRN
COMMUNITY

## 2022-04-13 RX ADMIN — ASPIRIN 81 MG: 81 TABLET, COATED ORAL at 20:11

## 2022-04-13 RX ADMIN — ALLOPURINOL 300 MG: 300 TABLET ORAL at 18:13

## 2022-04-13 RX ADMIN — ACETAMINOPHEN AND CODEINE PHOSPHATE 1 TABLET: 300; 30 TABLET ORAL at 16:54

## 2022-04-13 RX ADMIN — GABAPENTIN 300 MG: 300 CAPSULE ORAL at 20:10

## 2022-04-13 RX ADMIN — CHLORHEXIDINE GLUCONATE: 4 LIQUID TOPICAL at 08:35

## 2022-04-13 RX ADMIN — ISOSORBIDE MONONITRATE 30 MG: 30 TABLET, EXTENDED RELEASE ORAL at 18:13

## 2022-04-13 RX ADMIN — AMLODIPINE BESYLATE 5 MG: 5 TABLET ORAL at 18:13

## 2022-04-13 RX ADMIN — FAMOTIDINE 20 MG: 10 INJECTION, SOLUTION INTRAVENOUS at 09:31

## 2022-04-13 RX ADMIN — HYDROMORPHONE HYDROCHLORIDE 0.5 MG: 1 INJECTION, SOLUTION INTRAMUSCULAR; INTRAVENOUS; SUBCUTANEOUS at 20:19

## 2022-04-13 RX ADMIN — SODIUM CHLORIDE: 9 INJECTION, SOLUTION INTRAVENOUS at 08:34

## 2022-04-13 RX ADMIN — BUMETANIDE 1 MG: 1 TABLET ORAL at 20:11

## 2022-04-13 RX ADMIN — Medication 200 MG: at 20:11

## 2022-04-13 RX ADMIN — SPIRONOLACTONE 25 MG: 25 TABLET ORAL at 20:10

## 2022-04-13 RX ADMIN — Medication 1 TABLET: at 18:13

## 2022-04-13 RX ADMIN — MUPIROCIN: 20 OINTMENT TOPICAL at 09:35

## 2022-04-13 RX ADMIN — ATORVASTATIN CALCIUM 40 MG: 40 TABLET, FILM COATED ORAL at 20:11

## 2022-04-13 RX ADMIN — SODIUM CHLORIDE, PRESERVATIVE FREE 20 MG: 5 INJECTION INTRAVENOUS at 18:13

## 2022-04-13 RX ADMIN — MULTIPLE VITAMINS W/ MINERALS TAB 1 TABLET: TAB at 18:13

## 2022-04-13 ASSESSMENT — PAIN SCALES - GENERAL
PAINLEVEL_OUTOF10: 9
PAINLEVEL_OUTOF10: 0
PAINLEVEL_OUTOF10: 0
PAINLEVEL_OUTOF10: 7

## 2022-04-13 ASSESSMENT — LIFESTYLE VARIABLES: SMOKING_STATUS: 0

## 2022-04-13 NOTE — PROGRESS NOTES
Pt transported to 713 via stretcher per transporter. Pt transported with tememetry monitor 713. Wife has all pt belongings.

## 2022-04-13 NOTE — PROGRESS NOTES
Informed pt & wife for reason of delay. Anesthesiologist with another case at present and unavailable to assist Dr. Abelardo Pedersen.

## 2022-04-13 NOTE — LETTER
[facility]  Cardiac Rehab Department  [address]  [phone]  Toll Free [phone]              April 15, 2022    Dear Reynaldo Shell,    Please find enclosed information concerning the care of your pacemaker insertion site and the guidelines you should follow for the next four weeks of your recovery. Each of these recommendations apply unless you were specifically instructed otherwise by your cardiologist.    If you have not already received one, a transtelephonic patient transmitter has been ordered for you on your behalf. When in your possession and appropriate, unbox the transmitter, plug it in and complete your first pacemaker check by following the instructional pictures in the easy-to-follow pamphlet or on the transmitter itself. In the event you have a 'smartphone', an jovany may have been downloaded on your phone during your hospitilization to allow you to use your phone for the same purpose. If any questions or concerns arise or you experience difficulty completing the steps stated above, you should contact your cardiologist's office for assistance. Rest assured that the use of your transmitter will be reviewed with you as needed during your upcoming follow-up visit in Dr. Liang's office or via teleconference. Thank you. To Your [facility],        [facility] Cardiac Rehab Staff    ТАТЬЯНА Dsouza, MA Angélica Wagoner, PAVEL  Registered Nurse  Exercise Physiologist  Registered Nurse

## 2022-04-13 NOTE — H&P
Office Visit    4/4/2022  [facility], CHERYL - NP    Nurse Practitioner Adult Health  Coronary artery disease involving native coronary artery of native heart without angina pectoris +3 more    Dx  Follow-Up from Hospital; Referred by Samanta Galarza MD    Reason for Visit       Progress Notes  CHERYL Shell NP (Nurse Practitioner) [facility] Nurse Practitioner Adult Health  Expand All Collapse All  [facility]  [address] [place] [address] [address] [facility], [place]  [phone]        Chief Complaint / Reason for Being Seen: Hospital follow-up.     1. Coronary artery disease involving native coronary artery of native heart without angina pectoris    2. Essential hypertension    3. Dyslipidemia    4. Bradycardia          Patient with a history of CAD, aortic stenosis status post aortic valve replacement 2015, chronic kidney disease stage IV, diabetes, hypertension, hyperlipidemia and GERD.     Patient presented to the emergency department on 3/12/2022 for complaints of tachycardia.,  Hypertension and chest pain. Work-up included troponins which remained flat. 2D echo showed that the prostatic valve in the aortic position was functionally normally with an EF of 55 to 60%. Mildly enlarged right atrium. Mildly dilated left atrium. Carlson Xi showed possible small to moderate mid lateral ischemia with ejection fraction of 55%. Patient did undergo cardiac catheterization which showed: Patient tolerated the procedure well.   Non obstructive CAD. Recommendations:   Medical management. Aggressive risk factor management.   Cardiology did recommend discharging patient with a ZIO Patch in place. ZIO Patch showed sinus rhythm with a minimum heart rate of 30 bpm, maximum 129 bpm, averaging 60 bpm.  Patient did have trigger markers showing symptomatic bradycardia.   Did have 4 VT runs longest being 19 beats at an average rate of 145 bpm.  There are 11 SVT runs longest being 13 beats at a rate of 91 bpm.  Occasional isolated PVCs 1.3%.     Patient presents to clinic today for hospital follow-up. Denies any chest pain, pressure or tightness. There is no shortness of breath, orthopnea or PND. Patient is having some lightheadedness and dizzy notes but no syncope. Feeling very fatigued.        Subjective: Old records have been obtained from the referring providers.   Those records have been reviewed and summarized.       Moiz Medrano is a 68 y.o. male with the following history as recorded in [facility]:       Patient Active Problem List     Diagnosis Date Noted    Chest pain      CAD (coronary artery disease)      [facility]ox[facility] 03/13/2022    Tachycardia induced cardiomyopathy ([facility] 03/12/2022    Chronic bilateral low back pain with right-sided sciatica 01/16/2020    Valvular heart disease 03/26/2019    Chronic diastolic CHF (congestive heart failure) ([facility] 03/26/2019    S/P aortic valve replacement with bioprosthetic valve 07/24/2017    S/P AVR 08/09/2016    History of coronary artery stent placement 08/09/2016    Hyperlipemia      Diabetes mellitus ([facility]      Aortic stenosis      Obesity 05/21/2013    Hypertension 03/23/2012    Family history of early CAD 03/23/2012      Current Facility-Administered Medications          Current Outpatient Medications   Medication Sig Dispense Refill    folbee plus (FOLBEE PLUS) TABS Take 1 tablet by mouth daily        Niacin (VITAMIN B-3 PO) Take by mouth        amLODIPine (NORVASC) 5 MG tablet Take 5 mg by mouth daily IF /80        levothyroxine (SYNTHROID) 25 MCG tablet Take 1 tablet by mouth Daily 30 tablet 0    tamsulosin (FLOMAX) 0.4 MG capsule Take 0.4 mg by mouth daily        allopurinol (ZYLOPRIM) 300 MG tablet Take 300 mg by mouth daily        isosorbide mononitrate (IMDUR) 30 MG extended release tablet Take 30 mg by mouth daily        Coenzyme Q10 (COQ10) 200 MG CAPS Take 200 mg by mouth nightly         bumetanide (BUMEX) 2 MG tablet 2 mg 2 times daily         aspirin 81 MG tablet Take 81 mg by mouth daily        atorvastatin (LIPITOR) 40 MG tablet Take 1 tablet by mouth daily. (Patient taking differently: Take 40 mg by mouth every other day ) 30 tablet 5    omeprazole (PRILOSEC) 20 MG capsule Take 20 mg by mouth daily.        gabapentin (NEURONTIN) 300 MG capsule Take 300 mg by mouth nightly           No current facility-administered medications for this visit.         Allergies: Patient has no known allergies. Past Medical History        Past Medical History:   Diagnosis Date    Aortic stenosis       moderate    CAD (coronary artery disease)      stent to Circ    Chest tightness, discomfort, or pressure 3/23/2012    Chronic kidney disease       Stage 4    Diabetes mellitus ([facility]      Family history of early CAD 3/23/2012    GERD (gastroesophageal reflux disease)      Hyperlipemia      Hypertension 3/23/2012    Obesity      Unspecified sleep apnea           Past Surgical History         Past Surgical History:   Procedure Laterality Date    AORTIC VALVE REPLACEMENT   07/21/15     Tissue Valve, Dr. Dena Castillo   3/26/12    CARDIAC CATHETERIZATION   2013  JDT     with stent to Circ- EF 50%    CARDIAC CATHETERIZATION   13  JDT     EF 60%, no intervention    CARDIAC CATHETERIZATION   7/20/15  JDT     severe aortic insufficiency. EF 50%    THORACENTESIS Left 08/04/15    VASCULAR SURGERY   [facility] R superficial femoral artery and psuedoaneurysm.  Thrombin injection R SFA pseudoaneurysm.          Family History         Family History   Problem Relation Age of Onset    Heart Disease Other      High Blood Pressure Other           Social History            Tobacco Use    Smoking status: Former Smoker       Packs/day: 0.00       Years: 0.00       Pack years: 0.00       Quit date: 1985       Years since quittin.8    Smokeless tobacco: Never Used Substance Use Topics    Alcohol use: No            Review of System:     Review of Systems   Constitutional: Positive for fatigue. Negative for activity change, chills, diaphoresis and fever. HENT: Negative for congestion and sore throat. Respiratory: Negative for cough, chest tightness, shortness of breath and wheezing. Cardiovascular: Negative for chest pain, palpitations and leg swelling. Neurological: Positive for dizziness and light-headedness. Negative for syncope and headaches. Psychiatric/Behavioral: Negative for confusion. The patient is not nervous/anxious.          Objective:     /74   Pulse 66   Ht 5' 10\" (1.778 m)   Wt 257 lb (116.6 kg)   SpO2 97%   BMI 36.88 kg/m²      GENERAL - well developed and well nourished    HEENT   PERRLA, Hearing appears normal, conjunctive and lids are normal.  External inspection of ears and nose appear normal.  NECK - no thyromegaly, no JVD, trachea is in the midline  CARDIOVASCULAR  PMI is in the mid line clavicular position, Normal S1 and S2 with no systolic murmur. No S3 or S4    PULMONARY  no respiratory distress. No wheezes or rales. Lungs are clear to ausculation, normal respiratory effort. ABDOMEN   soft, non tender, no rebound  MUSCULOSKELETAL   range of motion of the upper and lower extermites appears normal and equal and is without pain   EXTREMITIES - no significant edema   NEUROLOGIC  gait and station are normal  SKIN - turgor is normal, skin warm and dry. PSYCHIATRIC - normal mood and affect, alert and orientated x 3,        ASSESSMENT:     ALL THE CARDIOLOGY PROBLEMS ARE LISTED ABOVE; HOWEVER, THE FOLLOWING SPECIFIC CARDIAC PROBLEMS / CONDITIONS WERE ADDRESSED AND TREATED DURING THE OFFICE VISIT TODAY:          Cardiac Specific Problem   Discussion and Plan             1. Severe bradycardia  initial encounter    ZIO Patch showing severe bradycardia during wake hours of 30 bpm symptom triggered by patient.   Patient is not on a beta-blocker. We will schedule implantation of permanent pacemaker with Dr. Julia Pederson.             2. CAD  initial encounter    No chest pain. Patient is on Lipitor, aspirin, Imdur. Continue present medical management             3. Hypertension  initial encounter    Blood pressure in the office today 124/74. O2 sat 97%. Patient is on Norvasc 5 mg daily.         PLAN:     No orders of the defined types were placed in this encounter.       Encounter Medications    No orders of the defined types were placed in this encounter.         Return for follow up post PPM. .     Discussed with patient and spouse.     I greatly appreciate the opportunity to meet Barb Basurto and your confidence in allowing me to participate in his cardiovascular care.        CHERYL Sun NP 4/4/2022 1:53 PM CDT                      This dictation was generated by voice recognition computer software. Although all attempts are made to edit dictation for accuracy, there may be errors in the transcription that are not intended. Admitted for elective pacemaker implantation advise indication alternatives benefits and risk patient agreeable plan today.   I have discussed with the patient regarding indications for the proposed procedure ICD/ Pacemaker implantation along with possible alternatives benefits and risks including but not limited to risks of death, myocardial infarction, stroke, contrast induced nephropathy which in some cases may lead to acute kidney failure requiring dialysis, allergic reactions, infections which may require treatment with antibiotics or removal of the device, bleeding requiring blood transfusion,  cardiac arrhthymias, respiratory failure which may require placing the patient on respiratory support such as a ventilator or breathing machine,risk of complications which may require vascular surgery,risks of collapsing the lung with development of a pneumothorax which may require placement of a chest tube, and risks of lead dislodgement which may require follow up surgery and repositioning of the leads. In addition there are long term risks including infection, and device or component failure which may require removal and/or replacement of various components. Risk of wound nonhealing t subsequent erosion etc. also discussed. We also discussed the risk of potential stroke or thromboembolic phenomena during the timeframe that the patient may be off off of anticoagulation. The patient is advised the device battery will eventually become depleted and require replacement at some point. The patient is awake and alert and understands the issues involved and indicates willingness to proceed as ordered. The patient is  a reasonable candidate for moderate conscious sedation. ASA score:  ASA 3 - Patient with moderate systemic disease with functional limitations    Mallampati: I (soft palate, uvula, fauces, tonsillar pillars visible)    Preferred vascular access site will be: left subclavian vein.

## 2022-04-13 NOTE — ANESTHESIA PRE PROCEDURE
Department of Anesthesiology  Preprocedure Note       Name:  Art Osborne   Age:  68 y.o.  :  1945                                          MRN:  [id number]         Date:  2022      Surgeon: * Surgery not found *    Procedure:     Medications prior to admission:   Prior to Admission medications    Medication Sig Start Date End Date Taking? Authorizing Provider   folbee plus (FOLBEE PLUS) TABS Take 1 tablet by mouth daily    Historical Provider, MD   Niacin (VITAMIN B-3 PO) Take 1 tablet by mouth daily     Historical Provider, MD   amLODIPine (NORVASC) 5 MG tablet Take 5 mg by mouth daily IF /80    Historical Provider, MD   levothyroxine (SYNTHROID) 25 MCG tablet Take 1 tablet by mouth Daily 3/15/22   CHERYL Ervin - CNP   tamsulosin (FLOMAX) 0.4 MG capsule Take 0.4 mg by mouth daily    Historical Provider, MD   allopurinol (ZYLOPRIM) 300 MG tablet Take 300 mg by mouth daily    Historical Provider, MD   isosorbide mononitrate (IMDUR) 30 MG extended release tablet Take 30 mg by mouth daily    Historical Provider, MD   Coenzyme Q10 (COQ10) 200 MG CAPS Take 200 mg by mouth in the morning and at bedtime     Historical Provider, MD   bumetanide (BUMEX) 2 MG tablet Take 1 mg by mouth 2 times daily 1 MG TWICE A DAY 17   Historical Provider, MD   aspirin 81 MG tablet Take 81 mg by mouth nightly PT TAKES AT NIGHT    Historical Provider, MD   atorvastatin (LIPITOR) 40 MG tablet Take 1 tablet by mouth daily. Patient taking differently: Take 40 mg by mouth every other day PT TAKES EVERY OTHER NIGHT 14   CHERYL Sullivan   omeprazole (PRILOSEC) 20 MG capsule Take 20 mg by mouth daily.     Historical Provider, MD   gabapentin (NEURONTIN) 300 MG capsule Take 300 mg by mouth nightly     Historical Provider, MD       Current medications:    Current Facility-Administered Medications   Medication Dose Route Frequency Provider Last Rate Last Admin    0.9 % sodium chloride infusion   IntraVENous Continuous Wayne Vargas  mL/hr at 04/13/22 0834 New Bag at 04/13/22 0834    ceFAZolin (ANCEF) 2000 mg in 0.9% sodium chloride 50 mL IVPB  2,000 mg IntraVENous On Call to Iam Baer MD        mupirocin (BACTROBAN) 2 % ointment   Nasal Once Wayne Vargas MD           Allergies:  No Known Allergies    Problem List:    Patient Active Problem List   Diagnosis Code    Hypertension I10    Family history of early CAD Z80.55    Obesity E66.9    Aortic stenosis I35.0    CAD (coronary artery disease) I25.10    Hyperlipemia E78.5    Diabetes mellitus ([facility] E11.9    S/P AVR Z95.2    History of coronary artery stent placement Z95.5    S/P aortic valve replacement with bioprosthetic valve Z95.3    Valvular heart disease I38    Chronic diastolic CHF (congestive heart failure) (HCC) I50.32    Chronic bilateral low back pain with right-sided sciatica M54.41, G89.29    Tachycardia induced cardiomyopathy (HCC) R00.0, I43    [facility]ox[facility]fib ([facility] I48.0    Chest pain R07.9    Symptomatic bradycardia R00.1       Past Medical History:        Diagnosis Date    Aortic stenosis     moderate    Arthritis     CAD (coronary artery disease) 5/13    stent to Circ    Chest tightness, discomfort, or pressure 3/23/2012    CHF [facility]congestive [facility] failure) ([facility])     Chronic anemia     RECEIVED PROCRIT    Chronic kidney disease     Stage 4    Diabetes mellitus ([facility]     Family history of early CAD 3/23/2012    GERD (gastroesophageal reflux disease)     Hyperlipemia     Hypertension 3/23/2012    Obesity     Thyroid disease     HYPOTHYROIDISM    Unspecified sleep apnea        Past Surgical History:        Procedure Laterality Date    AORTIC VALVE REPLACEMENT  07/21/15    Tissue Valve, Dr. Irvin Ramírez  3/26/12    CARDIAC CATHETERIZATION  5/21/2013  JDT    with stent to Circ- EF 50%    CARDIAC CATHETERIZATION  11/4/13  JDT    EF 60%, no intervention    CARDIAC CATHETERIZATION  7/20/15  JDT    severe aortic insufficiency. EF 50%    COLONOSCOPY      EYE SURGERY Bilateral     CATARACT OR    ROTATOR CUFF REPAIR Right     THORACENTESIS Left 08/04/15    VASCULAR SURGERY  13 [facility] & [address] R superficial femoral artery and psuedoaneurysm. Thrombin injection R SFA pseudoaneurysm. Social History:    Social History     Tobacco Use    Smoking status: Former Smoker     Packs/day: 0.00     Years: 0.00     Pack years: 0.00     Quit date: 1985     Years since quittin.8    Smokeless tobacco: Never Used   Substance Use Topics    Alcohol use: No                                Counseling given: No      Vital Signs (Current):   Vitals:    22 0817   BP: (!) 147/83   Pulse: 53   Resp: 18   Temp: 98.3 °F (36.8 °C)   TempSrc: Temporal   SpO2: 98%   Weight: 257 lb (116.6 kg)   Height: 5' 10\" (1.778 m)                                              BP Readings from Last 3 Encounters:   22 (!) 147/83   22 124/74   22 (!) 119/50       NPO Status:                                                                                 BMI:   Wt Readings from Last 3 Encounters:   22 257 lb (116.6 kg)   22 257 lb (116.6 kg)   22 261 lb 6.4 oz (118.6 kg)     Body mass index is 36.88 kg/m².     CBC:   Lab Results   Component Value Date    WBC 7.1 2022    RBC 4.63 2022    HGB 14.5 2022    HCT 44.3 2022    HCT 39.6 2012    MCV 95.7 2022    RDW 13.4 2022     2022     2012       CMP:   Lab Results   Component Value Date     2022     2012    K 3.8 2022    K 4.2 2022    K 3.8 2012     2022     2012    CO2 26 2022    BUN 23 2022    CREATININE 1.4 2022    CREATININE 0.9 2012    GFRAA >59 2022    AGRATIO 1.8 2013    LABGLOM 49 2022    GLUCOSE 138 2022    PROT 5.7 2022    PROT 6.2 08/08/2013    CALCIUM 9.0 03/13/2022    BILITOT 0.5 03/13/2022    ALKPHOS 95 03/13/2022    ALKPHOS 97 03/26/2012    AST 20 03/13/2022    ALT 28 03/13/2022       POC Tests: No results for input(s): POCGLU, POCNA, POCK, POCCL, POCBUN, POCHEMO, POCHCT in the last 72 hours. Coags:   Lab Results   Component Value Date    PROTIME 12.3 03/12/2022    INR 0.89 03/12/2022       HCG (If Applicable): No results found for: PREGTESTUR, PREGSERUM, HCG, HCGQUANT     ABGs:   Lab Results   Component Value Date    PO2ART 145 07/21/2015        Type & Screen (If Applicable):  No results found for: LABABO, LABRH    Drug/Infectious Status (If Applicable):  No results found for: HIV, HEPCAB    COVID-19 Screening (If Applicable):   Lab Results   Component Value Date    COVID19 Not Detected 03/12/2022           Anesthesia Evaluation  Patient summary reviewed no history of anesthetic complications:   Airway: Mallampati: II  TM distance: >3 FB   Neck ROM: full  Mouth opening: > = 3 FB Dental:    (+) upper dentures, lower dentures and edentulous      Pulmonary:normal exam  breath sounds clear to auscultation  (+) sleep apnea: on noncompliant,      (-) asthma, recent URI and not a current smoker          Patient did not smoke on day of surgery. Cardiovascular:  Exercise tolerance: good (>4 METS),   (+) hypertension:, valvular problems/murmurs (AVR 2015):, CABG/stent (Stent 2014):, CHF (no issues since AVR):, murmur,     (-) pacemaker, past MI and  angina    ECG reviewed  Rhythm: regular  Rate: abnormal  Echocardiogram reviewed  Stress test reviewed       Beta Blocker:  Not on Beta Blocker        PE comment: Bradycardic   Neuro/Psych:      (-) seizures, TIA and CVA           GI/Hepatic/Renal:   (+) GERD: well controlled, renal disease (Stage III): CRI,      (-) liver disease       Endo/Other:    (+) DiabetesType II DM, , hypothyroidism::., .    (-) hyperthyroidism               Abdominal:             Vascular:     - DVT. Other Findings:             Anesthesia Plan      general and MAC     ASA 3     (Preop famotidine  Discussed conversion to GETA if necessary)  Induction: intravenous. MIPS: Postoperative opioids intended and Prophylactic antiemetics administered. Anesthetic plan and risks discussed with patient and spouse. Use of blood products discussed with patient and spouse whom consented to blood products.                    Oumar Pedroza MD   4/13/2022

## 2022-04-14 VITALS
DIASTOLIC BLOOD PRESSURE: 77 MMHG | SYSTOLIC BLOOD PRESSURE: 119 MMHG | HEIGHT: 70 IN | BODY MASS INDEX: 36.79 KG/M2 | WEIGHT: 257 LBS | HEART RATE: 63 BPM | RESPIRATION RATE: 20 BRPM | TEMPERATURE: 97.5 F | OXYGEN SATURATION: 93 %

## 2022-04-14 LAB
EKG P AXIS: -71 DEGREES
EKG P AXIS: 54 DEGREES
EKG P-R INTERVAL: 172 MS
EKG P-R INTERVAL: 172 MS
EKG Q-T INTERVAL: 424 MS
EKG Q-T INTERVAL: 464 MS
EKG QRS DURATION: 112 MS
EKG QRS DURATION: 120 MS
EKG QTC CALCULATION (BAZETT): 424 MS
EKG QTC CALCULATION (BAZETT): 452 MS
EKG T AXIS: 116 DEGREES
EKG T AXIS: 150 DEGREES

## 2022-04-14 PROCEDURE — 99024 POSTOP FOLLOW-UP VISIT: CPT | Performed by: INTERNAL MEDICINE

## 2022-04-14 PROCEDURE — 93005 ELECTROCARDIOGRAM TRACING: CPT | Performed by: INTERNAL MEDICINE

## 2022-04-14 PROCEDURE — 6370000000 HC RX 637 (ALT 250 FOR IP): Performed by: INTERNAL MEDICINE

## 2022-04-14 RX ADMIN — AMLODIPINE BESYLATE 5 MG: 5 TABLET ORAL at 08:19

## 2022-04-14 RX ADMIN — ISOSORBIDE MONONITRATE 30 MG: 30 TABLET, EXTENDED RELEASE ORAL at 08:19

## 2022-04-14 RX ADMIN — MULTIPLE VITAMINS W/ MINERALS TAB 1 TABLET: TAB at 08:19

## 2022-04-14 RX ADMIN — PANTOPRAZOLE SODIUM 40 MG: 40 TABLET, DELAYED RELEASE ORAL at 05:49

## 2022-04-14 RX ADMIN — LEVOTHYROXINE SODIUM 25 MCG: 25 TABLET ORAL at 05:49

## 2022-04-14 RX ADMIN — BUMETANIDE 1 MG: 1 TABLET ORAL at 08:19

## 2022-04-14 RX ADMIN — ACETAMINOPHEN AND CODEINE PHOSPHATE 1 TABLET: 300; 30 TABLET ORAL at 08:19

## 2022-04-14 RX ADMIN — ALLOPURINOL 300 MG: 300 TABLET ORAL at 08:19

## 2022-04-14 RX ADMIN — SPIRONOLACTONE 25 MG: 25 TABLET ORAL at 08:19

## 2022-04-14 RX ADMIN — Medication 200 MG: at 08:19

## 2022-04-14 RX ADMIN — Medication 1 TABLET: at 08:19

## 2022-04-14 ASSESSMENT — PAIN DESCRIPTION - PAIN TYPE: TYPE: SURGICAL PAIN

## 2022-04-14 ASSESSMENT — PAIN DESCRIPTION - ORIENTATION: ORIENTATION: LEFT

## 2022-04-14 ASSESSMENT — PAIN SCALES - GENERAL
PAINLEVEL_OUTOF10: 7
PAINLEVEL_OUTOF10: 0

## 2022-04-14 ASSESSMENT — PAIN DESCRIPTION - DESCRIPTORS: DESCRIPTORS: ACHING

## 2022-04-14 ASSESSMENT — PAIN DESCRIPTION - LOCATION: LOCATION: CHEST

## 2022-04-14 NOTE — DISCHARGE SUMMARY
Discharge Summary    Almaz Vivas  :  1945  MRN:  [id number]    Admit date:  2022  Discharge date:  2022    Admitting Physician:  Ny Mccoy MD    Advance Directive: Full Code    Consults: none    Primary Care Physician:  Cuca Womack MD    Discharge Diagnoses:  Principal Problem:    Symptomatic bradycardia  Resolved Problems:    * No resolved hospital problems. *      Cardiology Specific Data:  Specialty Problems        Cardiology Problems    CAD (coronary artery disease)        Chest pain        Hypertension        Aortic stenosis        Hyperlipemia        Chronic diastolic CHF (congestive heart failure) (HCC)        Valvular heart disease        Tachycardia induced cardiomyopathy (HCC)        Paroxysmal A-fib ([facility])        * (Principal) Symptomatic bradycardia              Significant Diagnostic Studies:   VL Extremity Venous Left    Result Date: 2022  Vascular Upper Extremities Veins Procedure  Demographics   Patient Name     Melvina Ramirez Age                    68   Patient Number   [id number]         Gender                 Male   Visit Number     [id number]      Interpreting Physician Krzysztof Rich   Date of Birth    1945     Referring Physician    Kaylee Carrasco MD   Accession Number [id number]     CHANO/ [address] RVT  Procedure Type of Study:   Veins:Upper Extremities Veins, UE VENOUS UNILATERAL/FLU. Indications for Study:Pacemaker and Ultrasound guidance. Risk Factors History of Disease +------------------------------------------------------------+----+--------+ ! Diagnosis                                                   ! Date! Comments! +------------------------------------------------------------+----+--------+ ! Pain Management                                             !    !        ! +------------------------------------------------------------+----+--------+   - The patient's last creatinine was 1.5 mg/dl.  Allergies   - No known allergies. Impression   Successful left Subclavian vein access   Signature   ----------------------------------------------------------------  Electronically signed by Samantha KingInterpreting  physician) on 04/13/2022 03:50 PM  ----------------------------------------------------------------  Velocities are measured in cm/s ; Diameters are measured in mm Left UE Vein Measurements 2D Measurements +---------------+-----------------+----------------------+-----------------+ ! Location       ! Visualized       ! Compressibility       ! Thrombosis       ! +---------------+-----------------+----------------------+-----------------+ ! SCV            ! Yes              ! Yes                   ! None             ! +---------------+-----------------+----------------------+-----------------+    XR CHEST PORTABLE    Result Date: 4/13/2022  EXAMINATION: Chest one view 4/13/2022 HISTORY: Pacemaker placement. Evaluate for pneumothorax FINDINGS: Today's exam is compared to previous study of 3/12/2022. A transvenous pacer has been placed via left-sided approach with the proximal lead tip projected over the right atrium and distal lead tip projected over the right ventricle. There is no pneumothorax. Mild left basilar atelectasis is present. 1.. Transvenous pacer placed via left-sided approach without evidence of complications. 2. Mild left basilar atelectasis. Signed by Dr Kacie Zavala:   CBC:   Recent Labs     04/13/22  0808   WBC 7.1   HGB 14.5        BMP:    Recent Labs     04/13/22  0808      K 4.2      CO2 25   BUN 32*   CREATININE 1.5*   GLUCOSE 125*     INR: No results for input(s): INR in the last 72 hours. Lipids: No results for input(s): CHOL, HDL in the last 72 hours. Invalid input(s): LDLCALCU  ABGs:No results for input(s): PHART, ZRM6IUS, PO2ART, RBX2SHR, BEART, HGBAE, D1PVLWMO, CARBOXHGBART, 02THERAPY in the last 72 hours.   HgBA1c:  No results for input(s): LABA1C in the last 72 hours. Procedures: Dual-chamber permanent pacemaker implantation    Hospital Course: Admitted for elective pacemaker implantation performed yesterday from a left infraclavicular approach under ultrasound guidance tolerated well. Today doing well incision healing nicely no hematoma. Device interrogated functioning appropriately. Postprocedural chest x-ray no evidence of pneumothorax or other complications. I personally gave him and his wife written and oral wound care instructions. Follow-up in the office in the pacemaker clinic as scheduled. Discharge at this time in stable condition. Physical Exam:    Vital Signs: /77   Pulse 63   Temp 97.5 °F (36.4 °C) (Temporal)   Resp 20   Ht 5' 10\" (1.778 m)   Wt 257 lb (116.6 kg)   SpO2 93%   BMI 36.88 kg/m²     Physical Exam      Discharge Medications:         Medication List      CHANGE how you take these medications    atorvastatin 40 MG tablet  Commonly known as: LIPITOR  Take 1 tablet by mouth daily.   What changed:   · when to take this  · additional instructions        CONTINUE taking these medications    allopurinol 300 MG tablet  Commonly known as: ZYLOPRIM     amLODIPine 5 MG tablet  Commonly known as: NORVASC     aspirin 81 MG tablet     bumetanide 2 MG tablet  Commonly known as: BUMEX     CoQ10 200 MG Caps     fluticasone 50 MCG/ACT nasal spray  Commonly known as: FLONASE     folbee plus Tabs     gabapentin 300 MG capsule  Commonly known as: NEURONTIN     isosorbide mononitrate 30 MG extended release tablet  Commonly known as: IMDUR     levothyroxine 25 MCG tablet  Commonly known as: SYNTHROID  Take 1 tablet by mouth Daily     nitroGLYCERIN 0.4 MG SL tablet  Commonly known as: NITROSTAT     omeprazole 20 MG delayed release capsule  Commonly known as: PRILOSEC     PreserVision AREDS 2+Multi Vit Caps     spironolactone 25 MG tablet  Commonly known as: ALDACTONE     tamsulosin 0.4 MG capsule  Commonly known as: FLOMAX     VITAMIN B-3 PO            Discharge Instructions:   [facility] [address] [place] [address]  [phone]  On 4/22/2022  10:00 am Device/Wound Check    [facility]  [phone]    On 5/25/2022  11:30 am     Summer Ley MD  [facility] [id number]  [phone]    On 4/21/2022  9:00am hospital follow-up         Take medications as directed. Resume activity as tolerated. Diet: ADULT DIET;  Regular     Disposition: Patient is medically stable and will be discharged *    Welsey Rubalcava MD, 4/14/2022 1:05 PM

## 2022-04-14 NOTE — PROGRESS NOTES
I accidentally threw the dilaudid on the sharp container before I pulled it. I wasted it  with other nurse. Has to pull it again from [employer].

## 2022-04-15 NOTE — TELEPHONE ENCOUNTER
CHERYL Persaud - NP  You 13 minutes ago (11:42 AM)     LD    He has a wound and device checked on the 22nd.   will have to wait on clearing patient.

## 2022-04-18 ENCOUNTER — TELEPHONE (OUTPATIENT)
Dept: CARDIOLOGY CLINIC | Age: 77
End: 2022-04-18

## 2022-04-18 NOTE — TELEPHONE ENCOUNTER
Received initial carelink remote pacemaker set up transmission. Left message for patient. He is scheduled for a wound check on Friday 4/22/22.

## 2022-04-22 ENCOUNTER — NURSE ONLY (OUTPATIENT)
Dept: CARDIOLOGY CLINIC | Age: 77
End: 2022-04-22

## 2022-04-22 DIAGNOSIS — Z51.89 VISIT FOR WOUND CHECK: Primary | ICD-10-CM

## 2022-04-22 PROCEDURE — 99024 POSTOP FOLLOW-UP VISIT: CPT | Performed by: CLINICAL NURSE SPECIALIST

## 2022-04-22 NOTE — PROGRESS NOTES
Patient here for a wound check visit status post Pacemaker implant. Incision dry and intact. No redness, swelling, or drainage noted  Edges well approximated  Instructed patient to wash area with antibacterial soap and keep it clean and dry. Reviewed discharged instructions and questions answered.   Reviewed [facility] home monitor and patient verbalized understanding  Follow up as scheduled

## 2022-05-06 NOTE — PROCEDURES
Cardiac Dual Chamber Insert - 01527  Placement Operative Report    Zakia Lacey  [id number]  5/6/2022    Surgeon: Marta Hernandez    Anesthesia: Moderate IV conscious sedation    Procedure(s):   1. Dual-chamber pacemaker implantation  2. Monitoring of conscious sedation      Indications:  1. Sinus node dysfunction with symptomatic bradycardia      Procedure Details  The risks, benefits, complications, treatment options, and expected outcomes were discussed with the patient. The patient and/or family concurred with the proposed plan, giving informed consent. Patient was prepped and draped in the usual strict sterile fashion. After the antibiotic was completely infused, 30 cc 2% xylocaine was infiltrated into the left Infraclavicular area. Venous access obtained utilizing a micropuncture needle under ultrasonographic guidance and the micropuncture wire was advanced followed by the dilator. The wire was then exchanged for a standard 0.0.35 wire and then the dilator was removed. Next, an incision was made adjacent to the wire entry site. Utilizing sharp and blunt dissection a pocket was then created down to the prepectoralis fascia. The guidewire was identified, freed from the surrounding subcutaneous tissue, and delivered into the operative field. Next an 7french sheath and dilator was advanced over the wire then the dilator was removed. Another similar wire was advanced into the sheath then the sheath was removed leaving two wires in place in the subclavian vein. Next, the 7french sheath and dilators were advanced over their respective wires into the subclavian vein. The dilator and wires were then removed. Next the atrial and ventricular leads were inserted into their respective sheaths   And advanced into the right atrium and ventricle where the leads were positioned under fluoroscopic guidance. The sheaths were peeled away and removed. Appropriate sensing and thresholds were obtained.  No diaphragmatic pacing occurred at 10 V and 1.5 ms. The active fixation mechanisms were extended. Next, the leads were then sutured utilizing 2-0 ethibond sutures. Lead measurements were then rechecked. After having assured adequate hemostasis the leads were connected to the pulse generator and the pulse generator and leads were placed in the pocket. The pocket was copiously irrigated utilizing antibiotic solution. The pacemaker and leads system were visualized under fluoroscopy. Appropriate redundancy/slack in the leads were noted. The pins of the leads were beyond the set screws. Hemostasis was reverified. The pocket was then closed using 2-0 Vicryl for the deep layer and 3-0 Vicryl for the middle layer. Dermabond was then applied to the skin and sterile dressing was applied. At the end of the procedure instrument, needle, and sponge counts were correct. An arm immobilizer was applied at this point. An independent trained observer administered medications under my direction. The patient was continuously monitored with respect to level of consciousness, and vital signs/physiologic status throughout the case. For further details regarding specific medications and doses please refer to Cath Lab procedural notes. Anesthesia start time:1311  Anesthesia stop time:1354      Pacemaker Data:     Atrial lead   Medtronic  Model   9363-94   serial #   [id number]  Volt    0.5 V    PW    0.4 ms    Current   NA   ma       Impedance:   551   ohms        Slew rate:   NA   V/sec  P wave:   1.6 mv    Right Ventricular lead    Medtronic  Model   3053-00   serial #   [id number]  Volt    0.5    V     PW    0.4 ms     Current   NA   ma    I  Impedance:   629   ohms       Slew rate:   NA   V/sec  R wave:   5.2 mv      Generator  Medtronic  Model   [id number]  Serial   [id number]      Estimated Blood Loss:  Minimal         Complications:  None; patient tolerated the procedure well.            Disposition: Admitted to progressive care unit           Condition: stable      Amy Sanabria MD 5/6/2022 9:33 AM

## 2022-05-25 ENCOUNTER — OFFICE VISIT (OUTPATIENT)
Dept: CARDIOLOGY CLINIC | Age: 77
End: 2022-05-25
Payer: MEDICARE

## 2022-05-25 VITALS
HEART RATE: 75 BPM | WEIGHT: 252 LBS | HEIGHT: 70 IN | BODY MASS INDEX: 36.08 KG/M2 | DIASTOLIC BLOOD PRESSURE: 64 MMHG | SYSTOLIC BLOOD PRESSURE: 118 MMHG | OXYGEN SATURATION: 97 %

## 2022-05-25 DIAGNOSIS — I49.5 SA NODE DYSFUNCTION (HCC): ICD-10-CM

## 2022-05-25 DIAGNOSIS — I10 ESSENTIAL HYPERTENSION: ICD-10-CM

## 2022-05-25 DIAGNOSIS — I38 VALVULAR HEART DISEASE: ICD-10-CM

## 2022-05-25 DIAGNOSIS — I25.10 CORONARY ARTERY DISEASE INVOLVING NATIVE CORONARY ARTERY OF NATIVE HEART WITHOUT ANGINA PECTORIS: Primary | ICD-10-CM

## 2022-05-25 DIAGNOSIS — E78.5 DYSLIPIDEMIA: ICD-10-CM

## 2022-05-25 PROCEDURE — 99214 OFFICE O/P EST MOD 30 MIN: CPT | Performed by: NURSE PRACTITIONER

## 2022-05-25 PROCEDURE — 93288 INTERROG EVL PM/LDLS PM IP: CPT | Performed by: NURSE PRACTITIONER

## 2022-05-25 PROCEDURE — 1123F ACP DISCUSS/DSCN MKR DOCD: CPT | Performed by: NURSE PRACTITIONER

## 2022-05-25 RX ORDER — DOXAZOSIN 8 MG/1
8 TABLET ORAL NIGHTLY
COMMUNITY

## 2022-05-25 ASSESSMENT — ENCOUNTER SYMPTOMS
SHORTNESS OF BREATH: 0
CHEST TIGHTNESS: 0
COUGH: 0
WHEEZING: 0
SORE THROAT: 0

## 2022-05-25 NOTE — PROGRESS NOTES
[facility] Cardiology   Established Patient Office Visit   [facility]. [address] [facility]  [phone]        OFFICE VISIT:  2022    Parris Callejas - : 1945    Reason For Visit:  Amrita Villafuerte is a 68 y.o. male who is here for Post-Op Check    1. Coronary artery disease involving native coronary artery of native heart without angina pectoris    2. Valvular heart disease    3. Essential hypertension    4. Dyslipidemia    5. SA node dysfunction ([address]      Patient with a history of coronary artery disease, aortic stenosis status post aortic valve replacement , chronic kidney disease stage IV, diabetes, hypertension, hyperlipidemia and GERD. 2D echo showed that the prostatic valve in the aortic position was functionally normally with an EF of 55 to 60%. Mildly enlarged right atrium. Mildly dilated left atrium. Blase Gell showed possible small to moderate mid lateral ischemia with ejection fraction of 55%. Patient did undergo cardiac catheterization which showed: Patient tolerated the procedure well.   Non obstructive CAD. Recommendations:   Medical management. Aggressive risk factor management.   Cardiology did recommend discharging patient with a ZIO Patch in place. ZIO Patch showed sinus rhythm with a minimum heart rate of 30 bpm, maximum 129 bpm, averaging 60 bpm.  Patient did have trigger markers showing symptomatic bradycardia. Did have 4 VT runs longest being 19 beats at an average rate of 145 bpm.  There are 11 SVT runs longest being 13 beats at a rate of 91 bpm.  Occasional isolated PVCs 1.3%. Patient  underwent on 2022 elective implantation of a pacemaker. Without complications. Presents to clinic today for procedural follow-up. Patient denies any complaints. Feeling great. Has more energy and has been fishing.           Subjective    Parris Callejas is a 68 y.o. male with the following history as recorded in Dr [facility]:    Patient Active Problem List    Diagnosis Date Noted    Pacemaker     Chest pain     CAD (coronary artery disease)     Symptomatic bradycardia 04/13/2022    Paroxysmal A-fib (HCC) 03/13/2022    Tachycardia induced cardiomyopathy ([facility] 03/12/2022    Chronic bilateral low back pain with right-sided sciatica 01/16/2020    Valvular heart disease 03/26/2019    Chronic diastolic CHF (congestive heart failure) ([facility] 03/26/2019    S/P aortic valve replacement with bioprosthetic valve 07/24/2017    S/P AVR 08/09/2016    History of coronary artery stent placement 08/09/2016    Hyperlipemia     Diabetes mellitus ([facility]     Aortic stenosis     Obesity 05/21/2013    Hypertension 03/23/2012    Family history of early CAD 03/23/2012     Current Outpatient Medications   Medication Sig Dispense Refill    metoprolol tartrate (LOPRESSOR) 25 MG tablet Take 25 mg by mouth 2 times daily      doxazosin (CARDURA) 8 MG tablet Take 8 mg by mouth nightly      spironolactone (ALDACTONE) 25 MG tablet Take 25 mg by mouth in the morning and at bedtime      tamsulosin (FLOMAX) 0.4 MG capsule Take 0.4 mg by mouth nightly as needed      nitroGLYCERIN (NITROSTAT) 0.4 MG SL tablet Place 0.4 mg under the tongue every 5 minutes as needed for Chest pain up to max of 3 total doses. If no relief after 1 dose, call 911.       Multiple Vitamins-Minerals (PRESERVISION AREDS 2+MULTI VIT) CAPS Take 1 capsule by mouth in the morning and at bedtime      folbee plus (FOLBEE PLUS) TABS Take 1 tablet by mouth daily      Niacin (VITAMIN B-3 PO) Take 1 tablet by mouth daily       amLODIPine (NORVASC) 5 MG tablet Take 5 mg by mouth daily IF /80      levothyroxine (SYNTHROID) 25 MCG tablet Take 1 tablet by mouth Daily 30 tablet 0    allopurinol (ZYLOPRIM) 300 MG tablet Take 300 mg by mouth daily      isosorbide mononitrate (IMDUR) 30 MG extended release tablet Take 30 mg by mouth daily      Coenzyme Q10 (COQ10) 200 MG CAPS Take 200 mg by mouth in the morning and at bedtime  bumetanide (BUMEX) 2 MG tablet Take 1 mg by mouth 2 times daily 1 MG TWICE A DAY      aspirin 81 MG tablet Take 81 mg by mouth nightly PT TAKES AT NIGHT      atorvastatin (LIPITOR) 40 MG tablet Take 1 tablet by mouth daily. (Patient taking differently: Take 40 mg by mouth every other day PT TAKES EVERY OTHER NIGHT) 30 tablet 5    omeprazole (PRILOSEC) 20 MG capsule Take 20 mg by mouth daily.  gabapentin (NEURONTIN) 300 MG capsule Take 300 mg by mouth nightly        No current facility-administered medications for this visit. Allergies: Patient has no known allergies. Past Medical History:   Diagnosis Date    Aortic stenosis     moderate    Arthritis     CAD (coronary artery disease) 5/13    stent to Circ    Chest tightness, discomfort, or pressure 3/23/2012    [facility]est[facility])     Chronic anemia     RECEIVED PROCRIT    Chronic kidney disease     Stage 4    Diabetes mellitus ([facility]     Family history of early CAD 3/23/2012    GERD (gastroesophageal reflux disease)     Hyperlipemia     Hypertension 3/23/2012    Neuromuscular disorder ([facility]     Obesity     Thyroid disease     HYPOTHYROIDISM    Unspecified sleep apnea      Past Surgical History:   Procedure Laterality Date    AORTIC VALVE REPLACEMENT  07/21/15    Tissue Valve, Dr. Jules Pool  3/26/12    CARDIAC CATHETERIZATION  5/21/2013  JDT    with stent to Circ- EF 50%    CARDIAC CATHETERIZATION  11/4/13  JDT    EF 60%, no intervention    CARDIAC CATHETERIZATION  7/20/15  JDT    severe aortic insufficiency. EF 50%    COLONOSCOPY      EYE SURGERY Bilateral     CATARACT OR    ROTATOR CUFF REPAIR Right     THORACENTESIS Left 08/04/15    VASCULAR SURGERY  11-6-13 [facility] [address] R superficial femoral artery and psuedoaneurysm. Thrombin injection R SFA pseudoaneurysm.       Family History   Problem Relation Age of Onset    Heart Disease Other     High Blood Pressure Other      Social History Tobacco Use    Smoking status: Former Smoker     Packs/day: 0.00     Years: 0.00     Pack years: 0.00     Quit date: 1985     Years since quittin.9    Smokeless tobacco: Never Used   Substance Use Topics    Alcohol use: No          Review of Systems:    Review of Systems   Constitutional: Negative for activity change, chills, diaphoresis, fatigue and fever. HENT: Negative for congestion and sore throat. Respiratory: Negative for cough, chest tightness, shortness of breath and wheezing. Cardiovascular: Negative for chest pain, palpitations and leg swelling. Neurological: Negative for dizziness, syncope, light-headedness and headaches. Psychiatric/Behavioral: Negative for confusion. The patient is not nervous/anxious. Objective:    /64   Pulse 75   Ht 5' 10\" (1.778 m)   Wt 252 lb (114.3 kg)   SpO2 97%   BMI 36.16 kg/m²     GENERAL - well developed and well nourished, conversant  HEENT   PERRLA, Hearing appears normal, gentleman lids are normal.  External inspection of ears and nose appear normal.  NECK - no thyromegaly, no JVD, trachea is in the midline  CARDIOVASCULAR  PMI is in the mid line clavicular position, Normal S1 and S2 with no systolic murmur. No S3 or S4    PULMONARY  no respiratory distress. No wheezes or rales. Lungs are clear to ausculation, normal respiratory effort. ABDOMEN   soft, non tender, no rebound  MUSCULOSKELETAL   range of motion of the upper and lower extermites appears normal and equal and is without pain   EXTREMITIES - no significant edema   NEUROLOGIC  gait and station are normal  SKIN - turgor is normal, can warm and dry.   PSYCHIATRIC - normal mood and affect, alert and orientated x 3,      ASSESSMENT:    ALL THE CARDIOLOGY PROBLEMS ARE LISTED ABOVE; HOWEVER, THE FOLLOWING SPECIFIC CARDIAC PROBLEMS / CONDITIONS WERE ADDRESSED AND TREATED DURING THE OFFICE VISIT TODAY: MEDICAL DECISION MAKING             Cardiac Specific Problem / Diagnosis  Discussion and Data Reviewed Diagnostic Procedures Ordered Management Options Selected           1. Symptomatic bradycardia  Well Controlled   Review and summation of old records:    Pacemaker wound site looks well-healed. Interrogation shows battery status 13.2 years. Lead impedances stable. Thresholds stable. 1 episode of SVT. A paced 66.9%. [facility] 3 months. Yes Continue current medications:     Yes           2. CAD  Stable   Chest pain. Patient is on Lipitor, aspirin, co-Q10, Imdur and Lopressor. No Continue current medications:    Yes           3. Hypertension Stable  18/64. O2 sat 97%. Patient is on Norvasc 5 mg daily. Lopressor 25 mg twice a day. No Continue current medications: Yes                     No orders of the defined types were placed in this encounter. No orders of the defined types were placed in this encounter. Discussed with patient. Return in about 6 months (around 11/25/2022) for Dr Andra Howard or me . I greatly appreciate the opportunity to meet Anuel Tan and your confidence in allowing me to participate in his cardiovascular care. CHERYL Frey - NP  5/25/2022 11:21 AM CDT                    This dictation was generated by voice recognition computer software. Although all attempts are made to edit dictation for accuracy, there may be errors in the transcription that are not intended.

## 2022-08-24 PROCEDURE — 93296 REM INTERROG EVL PM/IDS: CPT | Performed by: NURSE PRACTITIONER

## 2022-08-24 PROCEDURE — 93294 REM INTERROG EVL PM/LDLS PM: CPT | Performed by: NURSE PRACTITIONER

## 2022-08-25 DIAGNOSIS — Z95.0 PACEMAKER: Primary | ICD-10-CM

## 2022-08-25 DIAGNOSIS — I49.5 SA NODE DYSFUNCTION (HCC): ICD-10-CM

## 2022-11-30 ENCOUNTER — TELEPHONE (OUTPATIENT)
Dept: CARDIOLOGY CLINIC | Age: 77
End: 2022-11-30

## 2022-12-01 ENCOUNTER — OFFICE VISIT (OUTPATIENT)
Dept: CARDIOLOGY CLINIC | Age: 77
End: 2022-12-01
Payer: MEDICARE

## 2022-12-01 VITALS
WEIGHT: 259 LBS | BODY MASS INDEX: 37.08 KG/M2 | HEART RATE: 79 BPM | HEIGHT: 70 IN | SYSTOLIC BLOOD PRESSURE: 120 MMHG | DIASTOLIC BLOOD PRESSURE: 64 MMHG

## 2022-12-01 DIAGNOSIS — I38 VALVULAR HEART DISEASE: ICD-10-CM

## 2022-12-01 DIAGNOSIS — Z95.2 H/O AORTIC VALVE REPLACEMENT: ICD-10-CM

## 2022-12-01 DIAGNOSIS — I10 ESSENTIAL HYPERTENSION: ICD-10-CM

## 2022-12-01 DIAGNOSIS — E78.5 DYSLIPIDEMIA: ICD-10-CM

## 2022-12-01 DIAGNOSIS — I49.5 SA NODE DYSFUNCTION (HCC): ICD-10-CM

## 2022-12-01 DIAGNOSIS — Z95.0 PACEMAKER: ICD-10-CM

## 2022-12-01 DIAGNOSIS — I25.10 CORONARY ARTERY DISEASE INVOLVING NATIVE CORONARY ARTERY OF NATIVE HEART WITHOUT ANGINA PECTORIS: Primary | ICD-10-CM

## 2022-12-01 PROCEDURE — 1123F ACP DISCUSS/DSCN MKR DOCD: CPT | Performed by: INTERNAL MEDICINE

## 2022-12-01 PROCEDURE — 99214 OFFICE O/P EST MOD 30 MIN: CPT | Performed by: INTERNAL MEDICINE

## 2022-12-01 PROCEDURE — 3078F DIAST BP <80 MM HG: CPT | Performed by: INTERNAL MEDICINE

## 2022-12-01 PROCEDURE — 93280 PM DEVICE PROGR EVAL DUAL: CPT | Performed by: INTERNAL MEDICINE

## 2022-12-01 PROCEDURE — 3074F SYST BP LT 130 MM HG: CPT | Performed by: INTERNAL MEDICINE

## 2022-12-01 ASSESSMENT — ENCOUNTER SYMPTOMS
VOMITING: 0
EYES NEGATIVE: 1
SHORTNESS OF BREATH: 0
DIARRHEA: 0
RESPIRATORY NEGATIVE: 1
NAUSEA: 0
GASTROINTESTINAL NEGATIVE: 1

## 2022-12-01 NOTE — PROGRESS NOTES
Pacemaker interrogated  Presenting rhythm:  AP VS, AP 79.5%,  <0.1%  Battey voltage 13 years  Lead status:  Lead impedance within range and stable  Sensing:  P waves 2.6 mV,  R waves 8.0mV  Thresholds:  Atrial 0.75V @ 0.4ms, ventricular 0.75@ 0.4ms  Observations:  2 monitored NSVT  2 monitored SVT  Reprogramming for sensitivity and threshold testing  Next [facility] appointment:  3/2/23

## 2023-03-06 ENCOUNTER — TELEPHONE (OUTPATIENT)
Dept: CARDIOLOGY CLINIC | Age: 78
End: 2023-03-06

## 2023-03-07 DIAGNOSIS — Z95.0 PACEMAKER: Primary | ICD-10-CM

## 2023-03-07 DIAGNOSIS — I49.5 SA NODE DYSFUNCTION (HCC): ICD-10-CM

## 2023-05-02 ENCOUNTER — TELEPHONE (OUTPATIENT)
Dept: CARDIOLOGY CLINIC | Age: 78
End: 2023-05-02

## 2023-05-02 NOTE — TELEPHONE ENCOUNTER
Date: 5-16-23    Cardiologist: Dr. Bushra Phipps    Procedure: EGD with dilation     Surgeon: Saray Mcgowan Office Visit: 12-1-22    Reason for office visit and medical concerns addressed at this office visit: CAD, HTN, pacemaker, valvular disease     Testing Performed and Date of Service:  Pacer report 3-7-23  Heart cath 3-12-22 No DONALDO    RCRI = 1 pt, low, 0.9%   METs 4    Current Medications: farxiga, lopressor, cardura, spironalactone, flomax, nitro, niacin, norvasc, synthroid, zyloprim, imdur, Q10, bumex, ASA, lipitor, prilosec, neurontin     Is the patient currently taking an anticoagulant? If so, what is the diagnosis the patient has been given to warrant the need for the anticoagulant?  ASA    Additional Notes: Cardiac Risk Request and med hold on ASA

## 2023-06-08 ENCOUNTER — OFFICE VISIT (OUTPATIENT)
Dept: CARDIOLOGY CLINIC | Age: 78
End: 2023-06-08
Payer: MEDICARE

## 2023-06-08 VITALS
HEIGHT: 70 IN | BODY MASS INDEX: 36.94 KG/M2 | DIASTOLIC BLOOD PRESSURE: 86 MMHG | WEIGHT: 258 LBS | HEART RATE: 63 BPM | SYSTOLIC BLOOD PRESSURE: 138 MMHG | OXYGEN SATURATION: 96 %

## 2023-06-08 DIAGNOSIS — I49.5 SA NODE DYSFUNCTION (HCC): ICD-10-CM

## 2023-06-08 DIAGNOSIS — I38 VALVULAR HEART DISEASE: ICD-10-CM

## 2023-06-08 DIAGNOSIS — E78.5 DYSLIPIDEMIA: ICD-10-CM

## 2023-06-08 DIAGNOSIS — I25.10 CORONARY ARTERY DISEASE INVOLVING NATIVE CORONARY ARTERY OF NATIVE HEART WITHOUT ANGINA PECTORIS: Primary | ICD-10-CM

## 2023-06-08 DIAGNOSIS — I10 ESSENTIAL HYPERTENSION: ICD-10-CM

## 2023-06-08 PROCEDURE — 1123F ACP DISCUSS/DSCN MKR DOCD: CPT | Performed by: NURSE PRACTITIONER

## 2023-06-08 PROCEDURE — 3079F DIAST BP 80-89 MM HG: CPT | Performed by: NURSE PRACTITIONER

## 2023-06-08 PROCEDURE — 99214 OFFICE O/P EST MOD 30 MIN: CPT | Performed by: NURSE PRACTITIONER

## 2023-06-08 PROCEDURE — 3075F SYST BP GE 130 - 139MM HG: CPT | Performed by: NURSE PRACTITIONER

## 2023-06-08 ASSESSMENT — ENCOUNTER SYMPTOMS
SORE THROAT: 0
CHEST TIGHTNESS: 0
COUGH: 0
WHEEZING: 0
SHORTNESS OF BREATH: 0

## 2023-06-08 NOTE — PROGRESS NOTES
Years: 0.00     Pack years: 0.00     Types: Cigarettes     Quit date: 1985     Years since quittin.0    Smokeless tobacco: Never   Substance Use Topics    Alcohol use: No          Review of Systems:    Review of Systems   Constitutional:  Negative for activity change, chills, diaphoresis, fatigue and fever. HENT:  Negative for congestion and sore throat. Respiratory:  Negative for cough, chest tightness, shortness of breath and wheezing. Cardiovascular:  Negative for chest pain, palpitations and leg swelling. Neurological:  Negative for dizziness, syncope, light-headedness and headaches. Psychiatric/Behavioral:  Negative for confusion. The patient is not nervous/anxious. Objective:    /86   Pulse 63   Ht 5' 10\" (1.778 m)   Wt 258 lb (117 kg)   SpO2 96%   BMI 37.02 kg/m²     GENERAL - well developed and well nourished, conversant  HEENT -  PERRLA, Hearing appears normal, gentleman lids are normal.  External inspection of ears and nose appear normal.  NECK - no thyromegaly, no JVD, trachea is in the midline  CARDIOVASCULAR - PMI is in the mid line clavicular position, Normal S1 and S2 with no systolic murmur. No S3 or S4    PULMONARY - no respiratory distress. No wheezes or rales. Lungs are clear to ausculation, normal respiratory effort. ABDOMEN  - soft, non tender, no rebound  MUSCULOSKELETAL  - range of motion of the upper and lower extermites appears normal and equal and is without pain   EXTREMITIES - no significant edema   NEUROLOGIC - gait and station are normal  SKIN - turgor is normal, can warm and dry.   PSYCHIATRIC - normal mood and affect, alert and orientated x 3,      ASSESSMENT:    ALL THE CARDIOLOGY PROBLEMS ARE LISTED ABOVE; HOWEVER, THE FOLLOWING SPECIFIC CARDIAC PROBLEMS / CONDITIONS WERE ADDRESSED AND TREATED DURING THE OFFICE VISIT TODAY:                                                                                            MEDICAL DECISION

## 2023-09-12 DIAGNOSIS — Z95.0 PACEMAKER: Primary | ICD-10-CM

## 2023-09-12 DIAGNOSIS — I49.5 SA NODE DYSFUNCTION (HCC): ICD-10-CM

## 2023-09-12 DIAGNOSIS — I47.1 PSVT (PAROXYSMAL SUPRAVENTRICULAR TACHYCARDIA) (HCC): ICD-10-CM

## 2023-09-12 PROCEDURE — 93294 REM INTERROG EVL PM/LDLS PM: CPT | Performed by: CLINICAL NURSE SPECIALIST

## 2023-09-12 PROCEDURE — 93296 REM INTERROG EVL PM/IDS: CPT | Performed by: CLINICAL NURSE SPECIALIST

## 2023-10-30 ENCOUNTER — TELEPHONE (OUTPATIENT)
Dept: NEUROSURGERY | Age: 78
End: 2023-10-30

## 2023-10-30 NOTE — TELEPHONE ENCOUNTER
[facility] New Patient Questionnaire    Diagnosis/Reason for Referral?    DX: M54.12 (ICD-10-CM) - Radiculopathy, cervical region    2. Who is completing questionnaire? Patient X Caregiver Family      3. Has the patient had any previous spinal/brain surgeries? NO        A. If yes, what is the name of the facility in which the surgery was performed? B. Procedure/Surgery performed? C. Who was the surgeon? D. When was the surgery? MM/YY       E. Did the patient improve after the surgery? 4. Is this a second opinion? If yes, Dr. Magnus Giraldo would like to review patient first before making the appointment. 5. Have MRI Images been obtain within the last year? (MRI CERVICAL SCHEDULED FOR 12/5/2023)   Yes  No      XR X  CT     If yes, where was the imaging performed? [facility]   If yes, what part of the body? Lumbar  Cervical X Thoracic  Brain     If yes, when was it obtained? 10/10/2023    Note: if the scan was performed at a facility other than [facility], the disc will need to be brought to the appointment or we need to reach out to obtain the disc. A. Was the patient instructed to provide the disc? Yes X  No      8. Has the patient had a NCV/EMG within the last year? Yes  X  No     If yes, where was it performed and date?      10/9/2023 Location: [facility] [place] [facility]      9. Has the patient been to Physical Therapy? Yes  No X     If yes, what location, how long attended, and last visit? Location:        Therapy Lasted:    Date of Last Visit:      10. Has the patient been to Pain Management? Yes  No X     If yes, what location and last visit     Location:   Last Visit:   Is it helping?

## 2023-12-05 ENCOUNTER — HOSPITAL ENCOUNTER (OUTPATIENT)
Dept: MRI IMAGING | Age: 78
Discharge: HOME | End: 2023-12-05
Payer: MEDICARE

## 2023-12-05 DIAGNOSIS — R94.131 ABNORMAL ELECTROMYOGRAM (EMG): ICD-10-CM

## 2023-12-05 PROCEDURE — 6360000004 HC RX CONTRAST MEDICATION: Performed by: NURSE PRACTITIONER

## 2023-12-05 PROCEDURE — 72156 MRI NECK SPINE W/O & W/DYE: CPT

## 2023-12-05 PROCEDURE — A9577 INJ MULTIHANCE: HCPCS | Performed by: NURSE PRACTITIONER

## 2023-12-05 RX ADMIN — GADOBENATE DIMEGLUMINE 20 ML: 529 INJECTION, SOLUTION INTRAVENOUS at 13:31

## 2023-12-07 ENCOUNTER — OFFICE VISIT (OUTPATIENT)
Dept: NEUROSURGERY | Age: 78
End: 2023-12-07
Payer: MEDICARE

## 2023-12-07 ENCOUNTER — TELEPHONE (OUTPATIENT)
Dept: NEUROSURGERY | Age: 78
End: 2023-12-07

## 2023-12-07 VITALS
SYSTOLIC BLOOD PRESSURE: 114 MMHG | BODY MASS INDEX: 36.94 KG/M2 | WEIGHT: 258 LBS | DIASTOLIC BLOOD PRESSURE: 72 MMHG | HEART RATE: 72 BPM | HEIGHT: 70 IN | OXYGEN SATURATION: 95 % | RESPIRATION RATE: 18 BRPM

## 2023-12-07 DIAGNOSIS — M79.601 RIGHT ARM PAIN: ICD-10-CM

## 2023-12-07 DIAGNOSIS — M48.02 FORAMINAL STENOSIS OF CERVICAL REGION: Primary | ICD-10-CM

## 2023-12-07 DIAGNOSIS — M50.30 DDD (DEGENERATIVE DISC DISEASE), CERVICAL: ICD-10-CM

## 2023-12-07 DIAGNOSIS — M54.2 NECK PAIN: ICD-10-CM

## 2023-12-07 PROCEDURE — 1123F ACP DISCUSS/DSCN MKR DOCD: CPT | Performed by: NURSE PRACTITIONER

## 2023-12-07 PROCEDURE — 3074F SYST BP LT 130 MM HG: CPT | Performed by: NURSE PRACTITIONER

## 2023-12-07 PROCEDURE — 99204 OFFICE O/P NEW MOD 45 MIN: CPT | Performed by: NURSE PRACTITIONER

## 2023-12-07 PROCEDURE — 3078F DIAST BP <80 MM HG: CPT | Performed by: NURSE PRACTITIONER

## 2023-12-07 RX ORDER — FINERENONE 10 MG/1
10 TABLET, FILM COATED ORAL DAILY
COMMUNITY
Start: 2023-10-05

## 2023-12-07 ASSESSMENT — ENCOUNTER SYMPTOMS
GASTROINTESTINAL NEGATIVE: 1
EYES NEGATIVE: 1
RESPIRATORY NEGATIVE: 1

## 2023-12-07 NOTE — PROGRESS NOTES
[facility]  Office Visit      Chief Complaint   Patient presents with    New Patient     Establishing care     Results     MRI Cervical Spine (12/5/2023)    Neck Pain     Patient states he has had pain for awhile and has worsened in the past couple of months. He states the pain does radiate into his RUE. He is currently taking Gabapentin and Tylenol to help manage the pain. Numbness     Patient states he does have numbness/tingling in his RUE. HISTORY OF PRESENT ILLNESS:    Giancarlo Dennis is a 68 y.o. male with a hx of DM, CAD, CKD who is familiar to our clinic who initially evaluated back in 2019 for similar complaints who presents with neck and RUE pain that started 2-3 months ago. The pain does radiate into the RIGHT scapula, triceps, forearm, and into the 4th and 5th digits. His pain is mostly located in the RUE. The patient complains of numbness of the left hand which he states has been there since his heart valve surgery. He does not have numbness in the fingertips, trouble using hands to perform fine motor tasks or ataxia. Denies unsteadiness. The patient has underwent a non-operative treatment course that has included:  NSAIDs  Gabapentin        Of note he does not use tobacco and does take blood thinning medications (ASA). Cardiology team here at [facility].                 Past Medical History:   Diagnosis Date    Aortic stenosis     moderate    Arthritis     CAD (coronary artery disease) 5/13    stent to Circ    Chest tightness, discomfort, or pressure 3/23/2012    CHF (congestive heart failure) (HCC)     Chronic anemia     RECEIVED PROCRIT    Chronic kidney disease     Stage 4    Diabetes mellitus (HCC)     Family history of early CAD 3/23/2012    GERD (gastroesophageal reflux disease)     Hyperlipemia     Hypertension 3/23/2012    Neuromuscular disorder ([address])     Obesity     Thyroid disease     HYPOTHYROIDISM    Unspecified sleep apnea        Past Surgical History:

## 2023-12-11 NOTE — TELEPHONE ENCOUNTER
Office note faxed to Dr Delvin Castillo office in [facility] via [facility] fax.

## 2024-03-19 PROCEDURE — 93296 REM INTERROG EVL PM/IDS: CPT | Performed by: NURSE PRACTITIONER

## 2024-03-19 PROCEDURE — 93294 REM INTERROG EVL PM/LDLS PM: CPT | Performed by: NURSE PRACTITIONER

## 2024-03-20 DIAGNOSIS — R00.1 BRADYCARDIA: ICD-10-CM

## 2024-03-20 DIAGNOSIS — Z95.0 PACEMAKER: Primary | ICD-10-CM

## 2024-03-20 DIAGNOSIS — I47.29 NSVT (NONSUSTAINED VENTRICULAR TACHYCARDIA) (HCC): ICD-10-CM

## 2024-06-20 ENCOUNTER — OFFICE VISIT (OUTPATIENT)
Dept: CARDIOLOGY CLINIC | Age: 79
End: 2024-06-20

## 2024-06-20 VITALS
WEIGHT: 256 LBS | HEART RATE: 72 BPM | DIASTOLIC BLOOD PRESSURE: 62 MMHG | SYSTOLIC BLOOD PRESSURE: 112 MMHG | BODY MASS INDEX: 36.65 KG/M2 | HEIGHT: 70 IN | OXYGEN SATURATION: 98 %

## 2024-06-20 DIAGNOSIS — I25.10 CORONARY ARTERY DISEASE INVOLVING NATIVE CORONARY ARTERY OF NATIVE HEART WITHOUT ANGINA PECTORIS: Primary | ICD-10-CM

## 2024-06-20 DIAGNOSIS — I10 ESSENTIAL HYPERTENSION: ICD-10-CM

## 2024-06-20 DIAGNOSIS — I49.5 SA NODE DYSFUNCTION (HCC): ICD-10-CM

## 2024-06-20 DIAGNOSIS — I38 VALVULAR HEART DISEASE: ICD-10-CM

## 2024-06-20 RX ORDER — DOXYCYCLINE HYCLATE 50 MG/1
50 TABLET, FILM COATED ORAL DAILY
COMMUNITY
Start: 2023-05-17

## 2024-06-20 ASSESSMENT — ENCOUNTER SYMPTOMS
COUGH: 0
WHEEZING: 0
CHEST TIGHTNESS: 0
SHORTNESS OF BREATH: 0
SORE THROAT: 0

## 2024-06-20 NOTE — PROGRESS NOTES
[facility] Cardiology   Established Patient Office Visit  [address]  [address]  [facility] [address]  [phone]        OFFICE VISIT:  2024    Yuan Mejias - : 1945    Reason For Visit:  Yuan is a 78 y.o. male who is here for 6 Month Follow-Up    1. Coronary artery disease involving native coronary artery of native heart without angina pectoris    2. Essential hypertension    3. Valvular heart disease    4. SA node dysfunction (HCC)      Patient with a history of coronary artery disease, aortic valve replacement, SA node dysfunction/pacemaker, hypertension, hyperlipidemia and paroxysmal atrial fib.    He is a patient of Dr. Liang.    Patient presents to clinic today for routine follow-up.  Patient denies any chest pain, pressure or tightness.  There is no shortness of breath, orthopnea or PND.  Patient denies any lightheadedness, dizziness or syncope.         2D echo showed that the prostatic valve in the aortic position was functionally normally with an EF of 55 to 60%.  Mildly enlarged right atrium.  Mildly dilated left atrium.  Lexiscan showed possible small to moderate mid lateral ischemia with ejection fraction of 55%.  Patient did undergo cardiac catheterization which showed: Patient tolerated the procedure well.   Non obstructive CAD. Recommendations:   Medical management. Aggressive risk factor management.   Cardiology did recommend discharging patient with a ZIO Patch in place.   ZIO Patch showed sinus rhythm with a minimum heart rate of 30 bpm, maximum 129 bpm, averaging 60 bpm.  Patient did have trigger markers showing symptomatic bradycardia.  Did have 4 VT runs longest being 19 beats at an average rate of 145 bpm.  There are 11 SVT runs longest being 13 beats at a rate of 91 bpm.  Occasional isolated PVCs 1.3%.     Patient  underwent on 2022 elective implantation of a pacemaker.  Without complications.      Subjective    Yuan Mejias is a 78 y.o. male with the following

## 2024-07-18 ENCOUNTER — TELEPHONE (OUTPATIENT)
Dept: NEUROSURGERY | Age: 79
End: 2024-07-18

## 2024-07-18 NOTE — TELEPHONE ENCOUNTER
Patient's wife called and asked when was the last time patient was seen in our office. I let her know it was 12/7/2023. She then asked if we had a copy of his MRI. I asked her is she wanting the report or actual images. She stated imaging report. I let her know we do have it and I can print it out and mail it to her. She stated she would stop by the office next week to pick it up. I let her know I would put it in an envelope and put it at the . She thanked me and voiced understanding.

## 2024-07-30 ENCOUNTER — HOSPITAL ENCOUNTER (OUTPATIENT)
Dept: PAIN MANAGEMENT | Age: 79
Discharge: HOME | End: 2024-07-30
Payer: MEDICARE

## 2024-07-30 ENCOUNTER — TRANSCRIBE ORDERS (OUTPATIENT)
Dept: ADMINISTRATIVE | Age: 79
End: 2024-07-30

## 2024-07-30 VITALS
RESPIRATION RATE: 18 BRPM | SYSTOLIC BLOOD PRESSURE: 139 MMHG | OXYGEN SATURATION: 92 % | HEART RATE: 81 BPM | DIASTOLIC BLOOD PRESSURE: 88 MMHG | TEMPERATURE: 97.2 F

## 2024-07-30 DIAGNOSIS — R52 PAIN MANAGEMENT: ICD-10-CM

## 2024-07-30 DIAGNOSIS — M47.896 OTHER SPONDYLOSIS, LUMBAR REGION: Primary | ICD-10-CM

## 2024-07-30 PROCEDURE — 2580000003 HC RX 258

## 2024-07-30 PROCEDURE — G0260 INJ FOR SACROILIAC JT ANESTH: HCPCS

## 2024-07-30 PROCEDURE — 6360000002 HC RX W HCPCS

## 2024-07-30 PROCEDURE — 2500000003 HC RX 250 WO HCPCS

## 2024-07-30 RX ORDER — SODIUM CHLORIDE 9 MG/ML
3 INJECTION, SOLUTION INTRAMUSCULAR; INTRAVENOUS; SUBCUTANEOUS ONCE
Status: DISCONTINUED | OUTPATIENT
Start: 2024-07-30 | End: 2024-08-01 | Stop reason: HOSPADM

## 2024-07-30 RX ORDER — METHYLPREDNISOLONE ACETATE 80 MG/ML
80 INJECTION, SUSPENSION INTRA-ARTICULAR; INTRALESIONAL; INTRAMUSCULAR; SOFT TISSUE ONCE
Status: DISCONTINUED | OUTPATIENT
Start: 2024-07-30 | End: 2024-08-01 | Stop reason: HOSPADM

## 2024-07-30 RX ORDER — BUPIVACAINE HYDROCHLORIDE 5 MG/ML
2 INJECTION, SOLUTION EPIDURAL; INTRACAUDAL ONCE
Status: DISCONTINUED | OUTPATIENT
Start: 2024-07-30 | End: 2024-08-01 | Stop reason: HOSPADM

## 2024-07-30 RX ORDER — LIDOCAINE HYDROCHLORIDE 10 MG/ML
7 INJECTION, SOLUTION EPIDURAL; INFILTRATION; INTRACAUDAL; PERINEURAL ONCE
Status: DISCONTINUED | OUTPATIENT
Start: 2024-07-30 | End: 2024-08-01 | Stop reason: HOSPADM

## 2024-07-30 NOTE — INTERVAL H&P NOTE
Update History & Physical    The patient's History and Physical  was reviewed with the patient and I examined the patient. There was no change. The surgical site was confirmed by the patient and me.     Plan: The risks, benefits, expected outcome, and alternative to the recommended procedure have been discussed with the patient. Patient understands and wants to proceed with the procedure.     Electronically signed by Aubrey James MD on 7/30/2024 at 10:56 AM

## 2024-07-30 NOTE — PROGRESS NOTES
Procedure:  Level of Consciousness: [x]Alert [x]Oriented []Disoriented []Lethargic  Anxiety Level: [x]Calm []Anxious []Depressed []Other  Skin: [x]Warm [x]Dry []Cool []Moist []Intact []Other  Cardiovascular: [x]Palpitations: [x]Never []Occasionally []Frequently  Chest Pain: [x]No []Yes  Respiratory:  [x]Unlabored []Labored []Cough ([] Productive []Unproductive)  HCG Required: [x]No []Yes   Results: []Negative []Positive  Knowledge Level:        []Patient/Other verbalized understanding of pre-procedure instructions.        [x]Assessment of post-op care needs (transportation, responsible caregiver)        []Able to discuss health care problems and how to deal with it.  Factors that Affect Teaching:        Language Barrier: []No []Yes - why:        Hearing Loss:        []No [x]Yes            Corrective Device:  [x]Yes []No        Vision Loss:           []No [x]Yes            Corrective Device:  [x]Yes []No        Memory Loss:       [x]No []Yes            []Short Term []Long Term  Motivational Level:  [x]Asks Questions                  []Extremely Anxious       [x]Seems Interested               []Seems Uninterested                  []Denies need for Education  Risk for Injury:  [x]Patient oriented to person, place and time  []History of frequent falls/loss of balance  Nutritional:  []Change in appetite   []Weight Gain   []Weight Loss  Functional:  []Requires assistance with ADL's

## 2024-08-29 ENCOUNTER — HOSPITAL ENCOUNTER (OUTPATIENT)
Dept: MRI IMAGING | Age: 79
Discharge: HOME | End: 2024-08-29
Attending: PHYSICAL MEDICINE & REHABILITATION
Payer: MEDICARE

## 2024-08-29 ENCOUNTER — HOSPITAL ENCOUNTER (OUTPATIENT)
Dept: MRI IMAGING | Age: 79
Discharge: HOME | End: 2024-08-23
Attending: PHYSICAL MEDICINE & REHABILITATION

## 2024-08-29 DIAGNOSIS — M47.896 OTHER SPONDYLOSIS, LUMBAR REGION: ICD-10-CM

## 2024-08-29 PROCEDURE — 72148 MRI LUMBAR SPINE W/O DYE: CPT

## 2024-09-22 PROCEDURE — 93296 REM INTERROG EVL PM/IDS: CPT | Performed by: NURSE PRACTITIONER

## 2024-09-22 PROCEDURE — 93294 REM INTERROG EVL PM/LDLS PM: CPT | Performed by: NURSE PRACTITIONER

## 2024-09-23 DIAGNOSIS — I49.5 SA NODE DYSFUNCTION (HCC): ICD-10-CM

## 2024-09-23 DIAGNOSIS — Z95.0 PACEMAKER: Primary | ICD-10-CM

## 2024-12-09 ENCOUNTER — OFFICE VISIT (OUTPATIENT)
Dept: CARDIOLOGY CLINIC | Age: 79
End: 2024-12-09

## 2024-12-09 VITALS
DIASTOLIC BLOOD PRESSURE: 60 MMHG | WEIGHT: 256 LBS | HEIGHT: 70 IN | BODY MASS INDEX: 36.65 KG/M2 | HEART RATE: 76 BPM | SYSTOLIC BLOOD PRESSURE: 120 MMHG

## 2024-12-09 DIAGNOSIS — Z95.5 H/O HEART ARTERY STENT: ICD-10-CM

## 2024-12-09 DIAGNOSIS — Z95.2 H/O AORTIC VALVE REPLACEMENT: ICD-10-CM

## 2024-12-09 DIAGNOSIS — I25.10 CORONARY ARTERY DISEASE INVOLVING NATIVE CORONARY ARTERY OF NATIVE HEART WITHOUT ANGINA PECTORIS: Primary | ICD-10-CM

## 2024-12-09 DIAGNOSIS — Z95.0 PACEMAKER: ICD-10-CM

## 2024-12-09 DIAGNOSIS — I10 ESSENTIAL HYPERTENSION: ICD-10-CM

## 2024-12-09 DIAGNOSIS — R06.02 SHORTNESS OF BREATH: ICD-10-CM

## 2024-12-09 DIAGNOSIS — E78.5 DYSLIPIDEMIA: ICD-10-CM

## 2024-12-09 DIAGNOSIS — I49.5 SA NODE DYSFUNCTION (HCC): ICD-10-CM

## 2024-12-09 DIAGNOSIS — R00.1 BRADYCARDIA: ICD-10-CM

## 2024-12-09 ASSESSMENT — ENCOUNTER SYMPTOMS
VOMITING: 0
DIARRHEA: 0
GASTROINTESTINAL NEGATIVE: 1
RESPIRATORY NEGATIVE: 1
SHORTNESS OF BREATH: 0
NAUSEA: 0
EYES NEGATIVE: 1

## 2024-12-09 NOTE — PROGRESS NOTES
Hugo called office today; he wanted to update you that taking 1/2 tablet of the spirolactone has made a big difference. Dizziness has subsided.   
Pacemaker interrogated  Presenting rhythm:  AP/VS, AP 87.4%,  <0.1%  Battey voltage 11.0 years  Lead status:  Lead impedance within range and stable  Sensing:  P waves 1.4 mV,  R waves 3.9 mV  Thresholds:  Atrial 0.750 V @ 0.4ms, ventricular 0.500 V @ 0.4ms  Observations:  1 monitored episode NSVT (appears to be SVT), 2 monitored episodes AT/AF, 1 monitored epsode Fast A&V  See scanned report for details  Reprogramming for sensitivity and threshold testing  [facility]    
Health     Financial Resource Strain: Not on file   Food Insecurity: Not on file   Transportation Needs: Not on file   Physical Activity: Not on file   Stress: Not on file   Social Connections: Unknown (10/11/2023)    Received from [facility]    Family and Community Support     Help with Day-to-Day Activities: Not on file     Lonely or Isolated: Not on file   Intimate Partner Violence: Unknown (10/11/2023)    Received from [facility]    Abuse Screen     Unsafe at Home or Work/School: Not on file     Feels Threatened by Someone?: Not on file     Does Anyone Keep You from Contacting Others or Doint Things Outside the Home?: Not on file     Physical Sign of Abuse Present: Not on file   Housing Stability: Unknown (10/11/2023)    Received from [facility]    Housing Stability     Current Living Arrangements: Not on file     Potentially Unsafe Housing Conditions: Not on file       Physical Examination:  /60   Pulse 76   Ht 1.778 m (5' 10\")   Wt 116.1 kg (256 lb)   BMI 36.73 kg/m²   Physical Exam  Vitals reviewed.   Constitutional:       Appearance: He is well-developed.   Neck:      Vascular: No carotid bruit or JVD.   Cardiovascular:      Rate and Rhythm: Normal rate and regular rhythm.      Heart sounds: Normal heart sounds. No murmur heard.     No friction rub. No gallop.   Pulmonary:      Effort: Pulmonary effort is normal. No respiratory distress.      Breath sounds: Normal breath sounds. No wheezing or rales.   Abdominal:      General: There is no distension.      Tenderness: There is no abdominal tenderness.   Lymphadenopathy:      Cervical: No cervical adenopathy.   Skin:     General: Skin is warm and dry.             ASSESSMENT:     Diagnosis Orders   1. Coronary artery disease involving native coronary artery of native heart without angina pectoris        2. Essential

## 2024-12-13 ENCOUNTER — HOSPITAL ENCOUNTER (OUTPATIENT)
Age: 79
Discharge: HOME | End: 2024-12-15
Attending: INTERNAL MEDICINE
Payer: OTHER GOVERNMENT

## 2024-12-13 DIAGNOSIS — R06.02 SHORTNESS OF BREATH: ICD-10-CM

## 2024-12-13 LAB
ECHO AO ASC DIAM: 3.3 CM
ECHO AO ROOT DIAM: 2.2 CM
ECHO AO SINUS VALSALVA DIAM: 2.9 CM
ECHO AO ST JNCT DIAM: 2.9 CM
ECHO AV AREA PEAK VELOCITY: 1.5 CM2
ECHO AV AREA VTI: 1.7 CM2
ECHO AV MEAN GRADIENT: 24 MMHG
ECHO AV MEAN VELOCITY: 2.3 M/S
ECHO AV PEAK GRADIENT: 45 MMHG
ECHO AV PEAK VELOCITY: 3.3 M/S
ECHO AV PEAK VELOCITY: 3.3 M/S
ECHO AV VTI: 74.1 CM
ECHO EST RA PRESSURE: 3 MMHG
ECHO IVC PROX: 1.9 CM
ECHO LA AREA 2C: 24.4 CM2
ECHO LA AREA 4C: 21.2 CM2
ECHO LA DIAMETER: 4.2 CM
ECHO LA MAJOR AXIS: 5.4 CM
ECHO LA MINOR AXIS: 6.2 CM
ECHO LA TO AORTIC ROOT RATIO: 1.91
ECHO LA VOL BP: 78 ML (ref 18–58)
ECHO LA VOL MOD A2C: 76 ML (ref 18–58)
ECHO LA VOL MOD A4C: 69 ML (ref 18–58)
ECHO LV E' LATERAL VELOCITY: 8.05 CM/S
ECHO LV E' SEPTAL VELOCITY: 7.4 CM/S
ECHO LV EDV A2C: 115 ML
ECHO LV EDV A4C: 145 ML
ECHO LV EJECTION FRACTION A2C: 72 %
ECHO LV EJECTION FRACTION A4C: 66 %
ECHO LV EJECTION FRACTION BIPLANE: 69 % (ref 55–100)
ECHO LV ESV A2C: 32 ML
ECHO LV ESV A4C: 50 ML
ECHO LV FRACTIONAL SHORTENING: 31 % (ref 28–44)
ECHO LV INTERNAL DIMENSION DIASTOLIC: 5.1 CM (ref 4.2–5.9)
ECHO LV INTERNAL DIMENSION SYSTOLIC: 3.5 CM
ECHO LV IVSD: 1.4 CM (ref 0.6–1)
ECHO LV MASS 2D: 316.2 G (ref 88–224)
ECHO LV POSTERIOR WALL DIASTOLIC: 1.5 CM (ref 0.6–1)
ECHO LV RELATIVE WALL THICKNESS RATIO: 0.59
ECHO LVOT AREA: 3.5 CM2
ECHO LVOT AV VTI INDEX: 0.49
ECHO LVOT DIAM: 2.1 CM
ECHO LVOT MEAN GRADIENT: 5 MMHG
ECHO LVOT PEAK GRADIENT: 8 MMHG
ECHO LVOT PEAK VELOCITY: 1.4 M/S
ECHO LVOT SV: 126 ML
ECHO LVOT VTI: 36.4 CM
ECHO MV A VELOCITY: 0.74 M/S
ECHO MV E DECELERATION TIME (DT): 235 MS
ECHO MV E VELOCITY: 0.89 M/S
ECHO MV E/A RATIO: 1.2
ECHO MV E/E' LATERAL: 11.06
ECHO MV E/E' RATIO (AVERAGED): 11.54
ECHO MV E/E' SEPTAL: 12.03
ECHO RA AREA 4C: 18.4 CM2
ECHO RA VOLUME: 55 ML
ECHO RIGHT VENTRICULAR SYSTOLIC PRESSURE (RVSP): 29 MMHG
ECHO RV BASAL DIMENSION: 3.6 CM
ECHO RV INTERNAL DIMENSION: 3.3 CM
ECHO RV LONGITUDINAL DIMENSION: 5.3 CM
ECHO RV MID DIMENSION: 4.7 CM
ECHO RV TAPSE: 2.1 CM (ref 1.7–?)
ECHO TV REGURGITANT MAX VELOCITY: 2.56 M/S
ECHO TV REGURGITANT PEAK GRADIENT: 26 MMHG

## 2024-12-13 PROCEDURE — 6360000004 HC RX CONTRAST MEDICATION: Performed by: INTERNAL MEDICINE

## 2024-12-13 PROCEDURE — C8929 TTE W OR WO FOL WCON,DOPPLER: HCPCS

## 2024-12-13 RX ADMIN — SULFUR HEXAFLUORIDE 2 ML: 60.7; .19; .19 INJECTION, POWDER, LYOPHILIZED, FOR SUSPENSION INTRAVENOUS; INTRAVESICAL at 14:07

## 2024-12-16 ENCOUNTER — APPOINTMENT (OUTPATIENT)
Dept: GENERAL RADIOLOGY | Age: 79
End: 2024-12-16
Payer: OTHER GOVERNMENT

## 2024-12-16 ENCOUNTER — APPOINTMENT (OUTPATIENT)
Dept: NUCLEAR MEDICINE | Age: 79
End: 2024-12-16
Attending: INTERNAL MEDICINE
Payer: OTHER GOVERNMENT

## 2024-12-16 ENCOUNTER — HOSPITAL ENCOUNTER (INPATIENT)
Age: 79
LOS: 2 days | Discharge: HOME | End: 2024-12-18
Attending: EMERGENCY MEDICINE | Admitting: STUDENT IN AN ORGANIZED HEALTH CARE EDUCATION/TRAINING PROGRAM
Payer: OTHER GOVERNMENT

## 2024-12-16 DIAGNOSIS — I48.91 ATRIAL FIBRILLATION (HCC): ICD-10-CM

## 2024-12-16 DIAGNOSIS — I48.91 ATRIAL FIBRILLATION WITH RAPID VENTRICULAR RESPONSE (HCC): Primary | ICD-10-CM

## 2024-12-16 DIAGNOSIS — I48.0 PAROXYSMAL ATRIAL FIBRILLATION (HCC): ICD-10-CM

## 2024-12-16 DIAGNOSIS — R06.89 DYSPNEA AND RESPIRATORY ABNORMALITIES: ICD-10-CM

## 2024-12-16 DIAGNOSIS — R42 DIZZINESS: ICD-10-CM

## 2024-12-16 DIAGNOSIS — R06.00 DYSPNEA AND RESPIRATORY ABNORMALITIES: ICD-10-CM

## 2024-12-16 LAB
ALBUMIN SERPL-MCNC: 4.2 G/DL (ref 3.5–5.2)
ALP SERPL-CCNC: 119 U/L (ref 40–129)
ALT SERPL-CCNC: 29 U/L (ref 5–41)
ANION GAP SERPL CALCULATED.3IONS-SCNC: 15 MMOL/L (ref 7–19)
AST SERPL-CCNC: 26 U/L (ref 5–40)
B PARAP IS1001 DNA NPH QL NAA+NON-PROBE: NOT DETECTED
B PERT.PT PRMT NPH QL NAA+NON-PROBE: NOT DETECTED
BASOPHILS # BLD: 0 K/UL (ref 0–0.2)
BASOPHILS NFR BLD: 0.3 % (ref 0–1)
BILIRUB SERPL-MCNC: 0.5 MG/DL (ref 0.2–1.2)
BNP BLD-MCNC: 2207 PG/ML (ref 0–449)
BUN SERPL-MCNC: 27 MG/DL (ref 8–23)
C PNEUM DNA NPH QL NAA+NON-PROBE: NOT DETECTED
CALCIUM SERPL-MCNC: 9.4 MG/DL (ref 8.8–10.2)
CHLORIDE SERPL-SCNC: 102 MMOL/L (ref 98–111)
CO2 SERPL-SCNC: 23 MMOL/L (ref 22–29)
CREAT SERPL-MCNC: 1.9 MG/DL (ref 0.7–1.2)
D DIMER PPP FEU-MCNC: 0.74 UG/ML FEU (ref 0–0.48)
EOSINOPHIL # BLD: 0.2 K/UL (ref 0–0.6)
EOSINOPHIL NFR BLD: 1.6 % (ref 0–5)
ERYTHROCYTE [DISTWIDTH] IN BLOOD BY AUTOMATED COUNT: 13.3 % (ref 11.5–14.5)
FLUAV RNA NPH QL NAA+NON-PROBE: NOT DETECTED
FLUBV RNA NPH QL NAA+NON-PROBE: NOT DETECTED
GLUCOSE BLD-MCNC: 100 MG/DL (ref 70–99)
GLUCOSE BLD-MCNC: 102 MG/DL (ref 70–99)
GLUCOSE BLD-MCNC: 99 MG/DL (ref 70–99)
GLUCOSE SERPL-MCNC: 125 MG/DL (ref 70–99)
HADV DNA NPH QL NAA+NON-PROBE: NOT DETECTED
HCOV 229E RNA NPH QL NAA+NON-PROBE: NOT DETECTED
HCOV HKU1 RNA NPH QL NAA+NON-PROBE: NOT DETECTED
HCOV NL63 RNA NPH QL NAA+NON-PROBE: NOT DETECTED
HCOV OC43 RNA NPH QL NAA+NON-PROBE: NOT DETECTED
HCT VFR BLD AUTO: 49.9 % (ref 42–52)
HGB BLD-MCNC: 16.3 G/DL (ref 14–18)
HMPV RNA NPH QL NAA+NON-PROBE: NOT DETECTED
HPIV1 RNA NPH QL NAA+NON-PROBE: NOT DETECTED
HPIV2 RNA NPH QL NAA+NON-PROBE: NOT DETECTED
HPIV3 RNA NPH QL NAA+NON-PROBE: NOT DETECTED
HPIV4 RNA NPH QL NAA+NON-PROBE: NOT DETECTED
IMM GRANULOCYTES # BLD: 0 K/UL
LYMPHOCYTES # BLD: 1.5 K/UL (ref 1.1–4.5)
LYMPHOCYTES NFR BLD: 16.1 % (ref 20–40)
M PNEUMO DNA NPH QL NAA+NON-PROBE: NOT DETECTED
MAGNESIUM SERPL-MCNC: 2.3 MG/DL (ref 1.6–2.4)
MCH RBC QN AUTO: 30.9 PG (ref 27–31)
MCHC RBC AUTO-ENTMCNC: 32.7 G/DL (ref 33–37)
MCV RBC AUTO: 94.5 FL (ref 80–94)
MONOCYTES # BLD: 0.8 K/UL (ref 0–0.9)
MONOCYTES NFR BLD: 8.5 % (ref 0–10)
NEUTROPHILS # BLD: 6.9 K/UL (ref 1.5–7.5)
NEUTS SEG NFR BLD: 73.1 % (ref 50–65)
PERFORMED ON: ABNORMAL
PERFORMED ON: ABNORMAL
PERFORMED ON: NORMAL
PLATELET # BLD AUTO: 178 K/UL (ref 130–400)
PMV BLD AUTO: 9.6 FL (ref 9.4–12.4)
POTASSIUM SERPL-SCNC: 3.9 MMOL/L (ref 3.5–5)
PROT SERPL-MCNC: 6.9 G/DL (ref 6.4–8.3)
RBC # BLD AUTO: 5.28 M/UL (ref 4.7–6.1)
RSV RNA NPH QL NAA+NON-PROBE: NOT DETECTED
RV+EV RNA NPH QL NAA+NON-PROBE: NOT DETECTED
SARS-COV-2 RNA NPH QL NAA+NON-PROBE: NOT DETECTED
SODIUM SERPL-SCNC: 140 MMOL/L (ref 136–145)
T4 FREE SERPL-MCNC: 1.39 NG/DL (ref 0.93–1.7)
TROPONIN, HIGH SENSITIVITY: 34 NG/L (ref 0–22)
TROPONIN, HIGH SENSITIVITY: 36 NG/L (ref 0–22)
TSH SERPL DL<=0.005 MIU/L-ACNC: 4.56 UIU/ML (ref 0.27–4.2)
WBC # BLD AUTO: 9.4 K/UL (ref 4.8–10.8)

## 2024-12-16 PROCEDURE — 99222 1ST HOSP IP/OBS MODERATE 55: CPT | Performed by: INTERNAL MEDICINE

## 2024-12-16 PROCEDURE — 78580 LUNG PERFUSION IMAGING: CPT

## 2024-12-16 PROCEDURE — 2140000000 HC CCU INTERMEDIATE R&B

## 2024-12-16 PROCEDURE — 96376 TX/PRO/DX INJ SAME DRUG ADON: CPT

## 2024-12-16 PROCEDURE — 2500000003 HC RX 250 WO HCPCS: Performed by: EMERGENCY MEDICINE

## 2024-12-16 PROCEDURE — 80053 COMPREHEN METABOLIC PANEL: CPT

## 2024-12-16 PROCEDURE — 6370000000 HC RX 637 (ALT 250 FOR IP): Performed by: STUDENT IN AN ORGANIZED HEALTH CARE EDUCATION/TRAINING PROGRAM

## 2024-12-16 PROCEDURE — 2580000003 HC RX 258: Performed by: STUDENT IN AN ORGANIZED HEALTH CARE EDUCATION/TRAINING PROGRAM

## 2024-12-16 PROCEDURE — A9540 TC99M MAA: HCPCS | Performed by: INTERNAL MEDICINE

## 2024-12-16 PROCEDURE — 0202U NFCT DS 22 TRGT SARS-COV-2: CPT

## 2024-12-16 PROCEDURE — 3430000000 HC RX DIAGNOSTIC RADIOPHARMACEUTICAL: Performed by: INTERNAL MEDICINE

## 2024-12-16 PROCEDURE — 84484 ASSAY OF TROPONIN QUANT: CPT

## 2024-12-16 PROCEDURE — 96365 THER/PROPH/DIAG IV INF INIT: CPT

## 2024-12-16 PROCEDURE — 85379 FIBRIN DEGRADATION QUANT: CPT

## 2024-12-16 PROCEDURE — 93005 ELECTROCARDIOGRAM TRACING: CPT | Performed by: EMERGENCY MEDICINE

## 2024-12-16 PROCEDURE — 85025 COMPLETE CBC W/AUTO DIFF WBC: CPT

## 2024-12-16 PROCEDURE — 83880 ASSAY OF NATRIURETIC PEPTIDE: CPT

## 2024-12-16 PROCEDURE — 99285 EMERGENCY DEPT VISIT HI MDM: CPT

## 2024-12-16 PROCEDURE — 83735 ASSAY OF MAGNESIUM: CPT

## 2024-12-16 PROCEDURE — 2580000003 HC RX 258: Performed by: EMERGENCY MEDICINE

## 2024-12-16 PROCEDURE — 36415 COLL VENOUS BLD VENIPUNCTURE: CPT

## 2024-12-16 PROCEDURE — 6370000000 HC RX 637 (ALT 250 FOR IP): Performed by: INTERNAL MEDICINE

## 2024-12-16 PROCEDURE — 71045 X-RAY EXAM CHEST 1 VIEW: CPT

## 2024-12-16 PROCEDURE — 94760 N-INVAS EAR/PLS OXIMETRY 1: CPT

## 2024-12-16 PROCEDURE — 71046 X-RAY EXAM CHEST 2 VIEWS: CPT

## 2024-12-16 PROCEDURE — 82962 GLUCOSE BLOOD TEST: CPT

## 2024-12-16 PROCEDURE — 84443 ASSAY THYROID STIM HORMONE: CPT

## 2024-12-16 PROCEDURE — 84439 ASSAY OF FREE THYROXINE: CPT

## 2024-12-16 RX ORDER — 0.9 % SODIUM CHLORIDE 0.9 %
1000 INTRAVENOUS SOLUTION INTRAVENOUS ONCE
Status: COMPLETED | OUTPATIENT
Start: 2024-12-16 | End: 2024-12-16

## 2024-12-16 RX ORDER — POLYETHYLENE GLYCOL 3350 17 G/17G
17 POWDER, FOR SOLUTION ORAL DAILY PRN
Status: DISCONTINUED | OUTPATIENT
Start: 2024-12-16 | End: 2024-12-18 | Stop reason: HOSPADM

## 2024-12-16 RX ORDER — POTASSIUM CHLORIDE 7.45 MG/ML
10 INJECTION INTRAVENOUS PRN
Status: DISCONTINUED | OUTPATIENT
Start: 2024-12-16 | End: 2024-12-18 | Stop reason: HOSPADM

## 2024-12-16 RX ORDER — SPIRONOLACTONE 25 MG/1
25 TABLET ORAL 2 TIMES DAILY
Status: DISCONTINUED | OUTPATIENT
Start: 2024-12-16 | End: 2024-12-18

## 2024-12-16 RX ORDER — ACETAMINOPHEN 650 MG/1
650 SUPPOSITORY RECTAL EVERY 6 HOURS PRN
Status: DISCONTINUED | OUTPATIENT
Start: 2024-12-16 | End: 2024-12-18 | Stop reason: HOSPADM

## 2024-12-16 RX ORDER — METOPROLOL TARTRATE 25 MG/1
25 TABLET, FILM COATED ORAL 2 TIMES DAILY
Status: DISCONTINUED | OUTPATIENT
Start: 2024-12-16 | End: 2024-12-18

## 2024-12-16 RX ORDER — ISOSORBIDE MONONITRATE 30 MG/1
30 TABLET, EXTENDED RELEASE ORAL DAILY
Status: DISCONTINUED | OUTPATIENT
Start: 2024-12-16 | End: 2024-12-18

## 2024-12-16 RX ORDER — ATORVASTATIN CALCIUM 40 MG/1
40 TABLET, FILM COATED ORAL EVERY OTHER DAY
Status: DISCONTINUED | OUTPATIENT
Start: 2024-12-16 | End: 2024-12-18 | Stop reason: HOSPADM

## 2024-12-16 RX ORDER — ONDANSETRON 2 MG/ML
4 INJECTION INTRAMUSCULAR; INTRAVENOUS EVERY 6 HOURS PRN
Status: DISCONTINUED | OUTPATIENT
Start: 2024-12-16 | End: 2024-12-18 | Stop reason: HOSPADM

## 2024-12-16 RX ORDER — PANTOPRAZOLE SODIUM 40 MG/1
40 TABLET, DELAYED RELEASE ORAL
Status: DISCONTINUED | OUTPATIENT
Start: 2024-12-16 | End: 2024-12-18 | Stop reason: HOSPADM

## 2024-12-16 RX ORDER — ONDANSETRON 4 MG/1
4 TABLET, ORALLY DISINTEGRATING ORAL EVERY 8 HOURS PRN
Status: DISCONTINUED | OUTPATIENT
Start: 2024-12-16 | End: 2024-12-18 | Stop reason: HOSPADM

## 2024-12-16 RX ORDER — DILTIAZEM HYDROCHLORIDE 5 MG/ML
10 INJECTION INTRAVENOUS ONCE
Status: COMPLETED | OUTPATIENT
Start: 2024-12-16 | End: 2024-12-16

## 2024-12-16 RX ORDER — LEVOTHYROXINE SODIUM 25 UG/1
25 TABLET ORAL DAILY
Status: DISCONTINUED | OUTPATIENT
Start: 2024-12-16 | End: 2024-12-18 | Stop reason: HOSPADM

## 2024-12-16 RX ORDER — DILTIAZEM HYDROCHLORIDE 120 MG/1
120 CAPSULE, COATED, EXTENDED RELEASE ORAL DAILY
Status: DISCONTINUED | OUTPATIENT
Start: 2024-12-16 | End: 2024-12-18 | Stop reason: HOSPADM

## 2024-12-16 RX ORDER — UBIDECARENONE 100 MG
200 CAPSULE ORAL 2 TIMES DAILY
Status: DISCONTINUED | OUTPATIENT
Start: 2024-12-16 | End: 2024-12-18 | Stop reason: HOSPADM

## 2024-12-16 RX ORDER — POTASSIUM CHLORIDE 1500 MG/1
40 TABLET, EXTENDED RELEASE ORAL PRN
Status: DISCONTINUED | OUTPATIENT
Start: 2024-12-16 | End: 2024-12-18 | Stop reason: HOSPADM

## 2024-12-16 RX ORDER — MAGNESIUM SULFATE IN WATER 40 MG/ML
2000 INJECTION, SOLUTION INTRAVENOUS PRN
Status: DISCONTINUED | OUTPATIENT
Start: 2024-12-16 | End: 2024-12-18 | Stop reason: HOSPADM

## 2024-12-16 RX ORDER — AMLODIPINE BESYLATE 5 MG/1
5 TABLET ORAL DAILY
Status: DISCONTINUED | OUTPATIENT
Start: 2024-12-16 | End: 2024-12-18

## 2024-12-16 RX ORDER — SODIUM CHLORIDE 0.9 % (FLUSH) 0.9 %
5-40 SYRINGE (ML) INJECTION PRN
Status: DISCONTINUED | OUTPATIENT
Start: 2024-12-16 | End: 2024-12-18 | Stop reason: HOSPADM

## 2024-12-16 RX ORDER — BUMETANIDE 1 MG/1
1 TABLET ORAL 2 TIMES DAILY
Status: DISCONTINUED | OUTPATIENT
Start: 2024-12-16 | End: 2024-12-18 | Stop reason: HOSPADM

## 2024-12-16 RX ORDER — INSULIN LISPRO 100 [IU]/ML
0-4 INJECTION, SOLUTION INTRAVENOUS; SUBCUTANEOUS
Status: DISCONTINUED | OUTPATIENT
Start: 2024-12-16 | End: 2024-12-18 | Stop reason: HOSPADM

## 2024-12-16 RX ORDER — ALLOPURINOL 300 MG/1
300 TABLET ORAL DAILY
Status: DISCONTINUED | OUTPATIENT
Start: 2024-12-16 | End: 2024-12-18 | Stop reason: HOSPADM

## 2024-12-16 RX ORDER — DEXTROSE MONOHYDRATE 100 MG/ML
INJECTION, SOLUTION INTRAVENOUS CONTINUOUS PRN
Status: DISCONTINUED | OUTPATIENT
Start: 2024-12-16 | End: 2024-12-18 | Stop reason: HOSPADM

## 2024-12-16 RX ORDER — SODIUM CHLORIDE 0.9 % (FLUSH) 0.9 %
5-40 SYRINGE (ML) INJECTION EVERY 12 HOURS SCHEDULED
Status: DISCONTINUED | OUTPATIENT
Start: 2024-12-16 | End: 2024-12-18 | Stop reason: HOSPADM

## 2024-12-16 RX ORDER — ACETAMINOPHEN 325 MG/1
650 TABLET ORAL EVERY 6 HOURS PRN
Status: DISCONTINUED | OUTPATIENT
Start: 2024-12-16 | End: 2024-12-18 | Stop reason: HOSPADM

## 2024-12-16 RX ORDER — AMIODARONE HYDROCHLORIDE 200 MG/1
200 TABLET ORAL 2 TIMES DAILY
Status: DISCONTINUED | OUTPATIENT
Start: 2024-12-16 | End: 2024-12-18 | Stop reason: HOSPADM

## 2024-12-16 RX ORDER — GABAPENTIN 300 MG/1
300 CAPSULE ORAL 2 TIMES DAILY
Status: DISCONTINUED | OUTPATIENT
Start: 2024-12-16 | End: 2024-12-18 | Stop reason: HOSPADM

## 2024-12-16 RX ORDER — SODIUM CHLORIDE 9 MG/ML
INJECTION, SOLUTION INTRAVENOUS PRN
Status: DISCONTINUED | OUTPATIENT
Start: 2024-12-16 | End: 2024-12-18 | Stop reason: HOSPADM

## 2024-12-16 RX ORDER — ASPIRIN 81 MG/1
81 TABLET ORAL NIGHTLY
Status: DISCONTINUED | OUTPATIENT
Start: 2024-12-16 | End: 2024-12-18 | Stop reason: HOSPADM

## 2024-12-16 RX ADMIN — DILTIAZEM HYDROCHLORIDE 5 MG/HR: 5 INJECTION, SOLUTION INTRAVENOUS at 04:50

## 2024-12-16 RX ADMIN — ASPIRIN 81 MG: 81 TABLET, COATED ORAL at 21:17

## 2024-12-16 RX ADMIN — SODIUM CHLORIDE 1000 ML: 9 INJECTION, SOLUTION INTRAVENOUS at 04:15

## 2024-12-16 RX ADMIN — DILTIAZEM HYDROCHLORIDE 120 MG: 120 CAPSULE, COATED, EXTENDED RELEASE ORAL at 10:56

## 2024-12-16 RX ADMIN — APIXABAN 5 MG: 5 TABLET, FILM COATED ORAL at 08:21

## 2024-12-16 RX ADMIN — METOPROLOL TARTRATE 25 MG: 25 TABLET, FILM COATED ORAL at 10:56

## 2024-12-16 RX ADMIN — BUMETANIDE 1 MG: 1 TABLET ORAL at 08:21

## 2024-12-16 RX ADMIN — Medication 200 MG: at 21:17

## 2024-12-16 RX ADMIN — PANTOPRAZOLE SODIUM 40 MG: 40 TABLET, DELAYED RELEASE ORAL at 06:17

## 2024-12-16 RX ADMIN — GABAPENTIN 300 MG: 300 CAPSULE ORAL at 21:16

## 2024-12-16 RX ADMIN — SODIUM CHLORIDE, PRESERVATIVE FREE 10 ML: 5 INJECTION INTRAVENOUS at 08:20

## 2024-12-16 RX ADMIN — SODIUM CHLORIDE, PRESERVATIVE FREE 10 ML: 5 INJECTION INTRAVENOUS at 08:23

## 2024-12-16 RX ADMIN — BUMETANIDE 1 MG: 1 TABLET ORAL at 21:16

## 2024-12-16 RX ADMIN — GABAPENTIN 300 MG: 300 CAPSULE ORAL at 08:21

## 2024-12-16 RX ADMIN — DILTIAZEM HYDROCHLORIDE 10 MG: 5 INJECTION, SOLUTION INTRAVENOUS at 04:09

## 2024-12-16 RX ADMIN — Medication 200 MG: at 08:20

## 2024-12-16 RX ADMIN — METOPROLOL TARTRATE 25 MG: 25 TABLET, FILM COATED ORAL at 21:16

## 2024-12-16 RX ADMIN — AMIODARONE HYDROCHLORIDE 200 MG: 200 TABLET ORAL at 21:17

## 2024-12-16 RX ADMIN — ATORVASTATIN CALCIUM 40 MG: 40 TABLET, FILM COATED ORAL at 08:21

## 2024-12-16 RX ADMIN — Medication 5 MILLICURIE: at 16:18

## 2024-12-16 RX ADMIN — ALLOPURINOL 300 MG: 300 TABLET ORAL at 08:21

## 2024-12-16 RX ADMIN — APIXABAN 5 MG: 5 TABLET, FILM COATED ORAL at 21:17

## 2024-12-16 RX ADMIN — LEVOTHYROXINE SODIUM 25 MCG: 25 TABLET ORAL at 06:17

## 2024-12-16 RX ADMIN — SODIUM CHLORIDE, PRESERVATIVE FREE 10 ML: 5 INJECTION INTRAVENOUS at 21:25

## 2024-12-16 RX ADMIN — AMIODARONE HYDROCHLORIDE 200 MG: 200 TABLET ORAL at 10:56

## 2024-12-16 RX ADMIN — DILTIAZEM HYDROCHLORIDE 10 MG: 5 INJECTION, SOLUTION INTRAVENOUS at 04:25

## 2024-12-16 ASSESSMENT — ENCOUNTER SYMPTOMS
NAUSEA: 0
GASTROINTESTINAL NEGATIVE: 1
COUGH: 0
SHORTNESS OF BREATH: 0
BACK PAIN: 0
RESPIRATORY NEGATIVE: 1
VOMITING: 0
ABDOMINAL PAIN: 0
EYES NEGATIVE: 1
SORE THROAT: 0
SHORTNESS OF BREATH: 1
RHINORRHEA: 0
DIARRHEA: 0

## 2024-12-16 ASSESSMENT — PAIN - FUNCTIONAL ASSESSMENT: PAIN_FUNCTIONAL_ASSESSMENT: NONE - DENIES PAIN

## 2024-12-16 NOTE — PROGRESS NOTES
4 Eyes Skin Assessment     NAME:  Yuan Mejias  YOB: 1945  MEDICAL RECORD NUMBER:  [id number]    The patient is being assessed for  Admission    I agree that at least one RN has performed a thorough Head to Toe Skin Assessment on the patient. ALL assessment sites listed below have been assessed.      Areas assessed by both nurses:    Head, Face, Ears, Shoulders, Back, Chest, Arms, Elbows, Hands, Sacrum. Buttock, Coccyx, Ischium, Legs. Feet and Heels, and Under Medical Devices         Does the Patient have a Wound? No noted wound(s)       Juan Prevention initiated by RN: Yes  Wound Care Orders initiated by RN: No    Pressure Injury (Stage 3,4, Unstageable, DTI, NWPT, and Complex wounds) if present, place Wound referral order by RN under : No    New Ostomies, if present place, Ostomy referral order under : No     Nurse 1 eSignature: Electronically signed by Cathleen Martínez RN on 12/16/24 at 6:34 AM CST    **SHARE this note so that the co-signing nurse can place an eSignature**    Nurse 2 eSignature: {Esignature:[id number]}

## 2024-12-16 NOTE — PROGRESS NOTES
°F (36.6 °C) (Oral)   Resp 16   Ht 1.778 m (5' 10\")   SpO2 93%   BMI 36.73 kg/m²     General appearance: No apparent distress, appears stated age and cooperative.  HEENT: Pupils equal, round, and reactive to light. Conjunctivae/corneas clear.  Neck: Supple, with full range of motion. No jugular venous distention. Trachea midline.  Respiratory:  Normal respiratory effort. Clear to auscultation, bilaterally without Rales/Wheezes/Rhonchi.  Cardiovascular: Regular rate and rhythm with normal S1/S2 without murmurs, rubs or gallops.  Abdomen: Soft, non-tender, non-distended with normal bowel sounds.  Musculoskeletal: No clubbing, cyanosis or edema bilaterally.  Full range of motion without deformity.  Skin: Skin color, texture, turgor normal.  No rashes or lesions.  Neurologic:  Neurovascularly intact without any focal sensory/motor deficits. Cranial nerves: II-XII intact, grossly non-focal.  Psychiatric: Alert and oriented, thought content appropriate, normal insight  Capillary Refill: Brisk, 3 seconds, normal   Peripheral Pulses: +2 palpable, equal bilaterally       Labs:   Recent Labs     12/16/24  0344   WBC 9.4   HGB 16.3   HCT 49.9        Recent Labs     12/16/24  0344      K 3.9      CO2 23   BUN 27*   CREATININE 1.9*   CALCIUM 9.4     Recent Labs     12/16/24  0344   AST 26   ALT 29   BILITOT 0.5   ALKPHOS 119     No results for input(s): \"INR\" in the last 72 hours.  No results for input(s): \"CKTOTAL\", \"TROPONINI\" in the last 72 hours.    Urinalysis:      Lab Results   Component Value Date/Time    NITRU Negative 03/12/2022 11:55 PM    BLOODU Negative 03/12/2022 11:55 PM    GLUCOSEU Negative 03/12/2022 11:55 PM       Radiology:  XR CHEST (2 VW)   Final Result   1.  No acute cardiopulmonary process.  Bibasilar atelectasis.               ______________________________________    Electronically signed by: ROBERTO CARDOZA M.D.   Date:     12/16/2024   Time:    07:58       XR CHEST PORTABLE   Final  Result       1.  No acute findings in the chest.               ______________________________________    Electronically signed by: ONEIL GARCIA M.D.   Date:     12/16/2024   Time:    06:07       NM LUNG VENT/PERFUSION (VQ)    (Results Pending)       IP CONSULT TO CARDIOLOGY    Assessment/Plan:    Active Hospital Problems    Diagnosis     New onset atrial fibrillation (HCC) [I48.91]        New Onset SVT possibly Afib.   Know SSS s/p pacemaker placement with hx of significant bradycardia.   Started on eliquis overnight.   On dilt infusion this am - still tachy in 120-130 range on telemetry. Also veyr frequent PVCs on telemetry.   Cardiology eval -    - noted NPO and tentative plan for cardioversion in am tomorrow.       SOB.   Tachycardia as above.   CXR clear.   Ddimer low level elevation.   Will check VQ - no CTA pursued secondary to advanced renal disease.       CKDIIIB stable.   Avoid nephrotoxic agents.             DVT Prophylaxis: eliquis  Diet: ADULT DIET; Regular; 4 carb choices (60 gm/meal); Low Fat/Low Chol/High Fiber/LEYDA  Diet NPO Exceptions are: Sips of Water with Meds  Code Status: Full Code      Dispo - cc        Octavia Skelton MD

## 2024-12-16 NOTE — ED NOTES
ED TO INPATIENT SBAR HANDOFF    Patient Name: Yuan Mejias   : 1945  78 y.o.   Family/Caregiver Present: Yes  Code Status Order: Prior    C-SSRS: Risk of Suicide: No Risk  Sitter No  Restraints:         Situation  Chief Complaint:   Chief Complaint   Patient presents with    Tachycardia     Pt states  at home.     Shortness of Breath     SOA started yesterday      Patient Diagnosis: New onset atrial fibrillation (HCC) [I48.91]     Brief Description of Patient's Condition: per MD \"78 y.o. male who presents to the emergency department for tachycardia shortness of breath and dizziness.  Patient states he began noticing symptoms yesterday morning around 3 AM and has been ongoing.  He denies any chest pain.  Does have prior history of paroxysmal atrial fibrillation however patient unaware of this and does not appear he is on anticoagulants or beta-blocker or calcium channel blocker.  He is followed by Dr. Liang.  No prior history of aortic  replacement.\"    Pt alert and oriented. Pt recently had workup with cardiology and recent ECHO. Pt has been given 2 10mg Cardizem bolus. Pt is currently on cardizem drip at 5. Pt has received 1L NS bolus. 20g to right AC.     Mental Status: oriented, alert, coherent, logical, thought processes intact, and able to concentrate and follow conversation  Arrived from: home    Imaging:   XR CHEST PORTABLE    (Results Pending)     COVID-19 Results:   Internal Administration   First Dose COVID-19, MODERNA BLUE border, Primary or Immunocompromised, (age 12y+), IM, 100 mcg/0.5mL  2021   Second Dose COVID-19, MODERNA BLUE border, Primary or Immunocompromised, (age 12y+), IM, 100 mcg/0.5mL   2021       Last COVID Lab SARS-CoV-2, NAAT (no units)   Date Value   2022 Not Detected           Abnormal labs:   Abnormal Labs Reviewed   BRAIN NATRIURETIC PEPTIDE - Abnormal; Notable for the following components:       Result Value    NT Pro-BNP 2,207 (*)     All

## 2024-12-16 NOTE — CONSULTS
[facility]  Cardiology Consult      Requesting MD:  Octavia Skelton MD   Admit Status:         History obtained from:   [] Patient  [] Other (specify):     Patient:  Yuan Mejias  [id number]     Chief Complaint:   Chief Complaint   Patient presents with    Tachycardia     Pt states  at home.     Shortness of Breath     SOA started yesterday          HPI: Mr. Mejias is a 78 y.o. male with a history of coronary artery disease previous stent previous aortic valve replacement I just saw in the office 12/9/2024 in usual state of health until Saturday morning at 0 300 had awakened with tachypalpitations which persisted until he finally came in yesterday admitted found to be in atrial fibrillation and started on intravenous diltiazem.  He does not recall having that previously.  D-dimer 0.74.  High-sensitivity troponins were 30 6 repeat 34.  proBNP 2207.  Cardiology consulted.    Review of Systems:  Review of Systems   Constitutional: Negative.  Negative for chills, fever and unexpected weight change.   HENT: Negative.     Eyes: Negative.    Respiratory: Negative.  Negative for shortness of breath.    Cardiovascular: Negative.  Negative for chest pain.   Gastrointestinal: Negative.  Negative for diarrhea, nausea and vomiting.   Endocrine: Negative.    Genitourinary: Negative.    Musculoskeletal: Negative.    Skin: Negative.    Neurological: Negative.    All other systems reviewed and are negative.      Cardiac Specific Data:  Specialty Problems          Cardiology Problems    CAD (coronary artery disease)        Chest pain        Pacemaker        Hypertension        Aortic stenosis        Hyperlipemia        Chronic diastolic CHF (congestive heart failure) (HCC)        Valvular heart disease        Tachycardia induced cardiomyopathy (HCC)        Paroxysmal A-fib (HCC)        * (Principal) New onset atrial fibrillation (HCC)        Symptomatic bradycardia           Past Medical  respiratory distress.      Breath sounds: Normal breath sounds. No stridor. No wheezing, rhonchi or rales.   Chest:      Chest wall: No tenderness.   Abdominal:      General: Abdomen is flat. Bowel sounds are normal. There is no distension.      Palpations: Abdomen is soft. There is no mass.      Tenderness: There is no abdominal tenderness. There is no right CVA tenderness, left CVA tenderness, guarding or rebound.      Hernia: No hernia is present.   Musculoskeletal:         General: No swelling, tenderness, deformity or signs of injury.      Cervical back: Normal range of motion and neck supple. No rigidity or tenderness.      Right lower leg: No edema.      Left lower leg: No edema.   Lymphadenopathy:      Cervical: No cervical adenopathy.   Skin:     General: Skin is warm and dry.   Neurological:      General: No focal deficit present.      Mental Status: He is alert and oriented to person, place, and time. Mental status is at baseline.      Cranial Nerves: No cranial nerve deficit.      Sensory: No sensory deficit.      Motor: No weakness.      Coordination: Coordination normal.   Psychiatric:         Mood and Affect: Mood normal.         Behavior: Behavior normal.         Thought Content: Thought content normal.         Judgment: Judgment normal.         Labs:  Recent Labs     12/16/24  0344   WBC 9.4   HGB 16.3          Recent Labs     12/16/24  0344      K 3.9      CO2 23   BUN 27*   CREATININE 1.9*   LABGLOM 35*   MG 2.3   CALCIUM 9.4       CK, CKMB, Troponin: @LABRCNT (CKTOTAL:3, CKMB:3, TROPONINI:3)@    Last 3 BNP:  No results for input(s): \"BNP\" in the last 72 hours.        IMAGING:  XR CHEST (2 VW)    Result Date: 12/16/2024  EXAM: CHEST RADIOGRAPH  TECHNIQUE: Frontal and lateral views of the chest.  HISTORY: Palpitations  COMPARISON: 12/16/2024  FINDINGS: Mild bibasilar atelectasis.  The lungs are otherwise clear without focal consolidation.  No pleural effusion or pneumothorax.

## 2024-12-16 NOTE — ED PROVIDER NOTES
CBC WITH AUTO DIFFERENTIAL - Abnormal; Notable for the following components:    MCV 94.5 (*)     MCHC 32.7 (*)     Neutrophils % 73.1 (*)     Lymphocytes % 16.1 (*)     All other components within normal limits   COMPREHENSIVE METABOLIC PANEL - Abnormal; Notable for the following components:    Glucose 125 (*)     BUN 27 (*)     Creatinine 1.9 (*)     Est, Glom Filt Rate 35 (*)     All other components within normal limits   TROPONIN - Abnormal; Notable for the following components:    Troponin, High Sensitivity 36 (*)     All other components within normal limits   MAGNESIUM   TROPONIN   TSH WITH REFLEX TO FT4       All other labs were within normal range or not returned as of this dictation.    EMERGENCY DEPARTMENT COURSE and DIFFERENTIAL DIAGNOSIS/MDM:   Vitals:    Vitals:    12/16/24 0415 12/16/24 0430 12/16/24 0445 12/16/24 0450   BP: 98/76 (!) 88/68 111/80 111/80   Pulse: (!) 123 (!) 106 (!) 112 (!) 121   Resp: 14 16 12    Temp:       TempSrc:       SpO2: 98% 96% 98%        MDM     Amount and/or Complexity of Data Reviewed  Clinical lab tests: ordered and reviewed  Tests in the radiology section of CPT®: ordered and reviewed  Discuss the patient with other providers: yes  Independent visualization of images, tracings, or specimens: yes    Critical Care  Total time providing critical care: 40 minutes      Borderline BP on arrival but is improving.  Pt is well appearing in NAD.  Pt given two 10mg cardizem bolus and starting gtt.  ?hx of prior afib based on prior admit in 2022 when had some palpitations but pt not on anticoag or rate control med.  Labs reassuring so far.  D/w hospitalist for admission.    CRITICAL CARE TIME   Total Critical Care time was 40 minutes, excluding separately reportable procedures.  There was a high probability of clinically significant/life threatening deterioration in the patient's condition which required my urgent intervention.          CONSULTS:  IP CONSULT TO  CARDIOLOGY    :  Unless otherwise noted below, none     Procedures    FINAL IMPRESSION      1. Atrial fibrillation with rapid ventricular response (HCC)    2. Dyspnea and respiratory abnormalities    3. Dizziness          DISPOSITION/PLAN   DISPOSITION Decision To Admit 12/16/2024 04:57:04 AM   DISPOSITION CONDITION Stable           PATIENT REFERRED TO:  No follow-up provider specified.    DISCHARGE MEDICATIONS:  New Prescriptions    No medications on file          (Please note that portions of this note were completed with a voice recognition program.  Efforts were made to edit thedictations but occasionally words are mis-transcribed.)    CEFERINO AGARWAL MD (electronically signed)Emergency Physician        Ceferino Agarwal MD  12/16/24 [id number]

## 2024-12-16 NOTE — H&P
[facility]      Hospitalist - History & Physical      PCP: Desmond Mayer MD    Date of Admission: 12/16/2024    Date of Service: 12/16/2024    Chief Complaint: Palpitations    History Of Present Illness:   The patient is a 78 y.o. male who presented with chief complaint of palpitations.  Patient states that he woke up around 3 AM yesterday on 12/15/2024 with chief complaint of palpitations.  He states he fluctuated throughout the day and then last night he decided to come to the emergency department for further workup.  Patient has a history of aortic stenosis with valvular replacement.  Recent echocardiogram was done.  He had had multiple event monitors with no findings of atrial fibrillation.  At this time the patient did show some rhythms consistent with atrial fibrillation but currently is in sinus rhythm on my evaluation.  Patient does have tachycardia still up to the 120s.  He was started on diltiazem drip by the ER provider we will continue this and monitor closely in PCU.  Blood pressures are soft but stable.  We will hold his home antihypertensives for now.  Cardiology will be consulted as the patient does see Dr. Liang for his chronic cardiology concerns.  Patient expressed understanding and agreement with the plan and had no further questions or concerns at this time.    Past Medical History:        Diagnosis Date    Aortic stenosis     moderate    Arthritis     CAD (coronary artery disease) 5/13    stent to Circ    Chest tightness, discomfort, or pressure 3/23/2012    CHF (congestive heart failure) (HCC)     Chronic anemia     RECEIVED PROCRIT    Chronic kidney disease     Stage 4    Diabetes mellitus ([facility])     Family history of early CAD 3/23/2012    GERD (gastroesophageal reflux disease)     Hyperlipemia     Hypertension 3/23/2012    Neuromuscular disorder (HCC)     Obesity     Thyroid disease     HYPOTHYROIDISM    Unspecified sleep apnea        Past Surgical History:        Procedure  by mouth in the morning and at bedtime    Yes Anum Pelaez MD   bumetanide (BUMEX) 2 MG tablet Take 0.5 tablets by mouth 2 times daily 1 MG TWICE A DAY 7/13/17  Yes Anum Pelaez MD   aspirin 81 MG tablet Take 1 tablet by mouth nightly PT TAKES AT NIGHT   Yes Anum Pelaez MD   atorvastatin (LIPITOR) 40 MG tablet Take 1 tablet by mouth daily.  Patient taking differently: Take 1 tablet by mouth every other day PT TAKES EVERY OTHER NIGHT 6/20/14  Yes Kathleen Bolivar APRN   omeprazole (PRILOSEC) 20 MG capsule Take 1 capsule by mouth daily   Yes Anum Pelaez MD   gabapentin (NEURONTIN) 300 MG capsule Take 1 capsule by mouth in the morning and at bedtime.   Yes Anum Pelaez MD   nitroGLYCERIN (NITROSTAT) 0.4 MG SL tablet Place 1 tablet under the tongue every 5 minutes as needed for Chest pain up to max of 3 total doses. If no relief after 1 dose, call 911.    Anum Pelaez MD       Allergies:    Nsaids and Plasma protein fraction    Social History:    Tobacco:   reports that he quit smoking about 39 years ago. His smoking use included cigarettes. He has never used smokeless tobacco.  Alcohol:   reports no history of alcohol use.  Illicit Drugs: Never    Family History:      Problem Relation Age of Onset    Heart Disease Other     High Blood Pressure Other        Review of Systems: 12 point review of systems was reviewed and negative      Physical Examination:  General:  Awake, looks stated age, and pleasant  HEENT: PEERLA, EOM intact, oral mucosa is moist.  Skin: No rash, no jaundice, no ulcer.  Neck: no lymphadenopathy  CVS: Tachycardic rate, +S1, +S2, murmur heard  Lungs: Slight basilar crackles  Abdomen: Nondistended, +BS, Nontender  Extremity: No leg edema. No cyanosis or clubbing.  Neurology: Alert and responsive and oriented to person, place, and time. No gross motor or sensorial deficits.    Diagnostic Data:  Imaging:   XR CHEST PORTABLE    (Results Pending)

## 2024-12-17 PROBLEM — I48.91 ATRIAL FIBRILLATION (HCC): Status: ACTIVE | Noted: 2024-12-16

## 2024-12-17 LAB
ALBUMIN SERPL-MCNC: 3.8 G/DL (ref 3.5–5.2)
ALP SERPL-CCNC: 102 U/L (ref 40–129)
ALT SERPL-CCNC: 23 U/L (ref 5–41)
ANION GAP SERPL CALCULATED.3IONS-SCNC: 12 MMOL/L (ref 7–19)
AST SERPL-CCNC: 18 U/L (ref 5–40)
BASOPHILS # BLD: 0.1 K/UL (ref 0–0.2)
BASOPHILS NFR BLD: 0.7 % (ref 0–1)
BILIRUB SERPL-MCNC: 0.7 MG/DL (ref 0.2–1.2)
BUN SERPL-MCNC: 24 MG/DL (ref 8–23)
CALCIUM SERPL-MCNC: 9.1 MG/DL (ref 8.8–10.2)
CHLORIDE SERPL-SCNC: 102 MMOL/L (ref 98–111)
CO2 SERPL-SCNC: 26 MMOL/L (ref 22–29)
CREAT SERPL-MCNC: 1.9 MG/DL (ref 0.7–1.2)
EKG P AXIS: 76 DEGREES
EKG P AXIS: NORMAL DEGREES
EKG P-R INTERVAL: 200 MS
EKG P-R INTERVAL: NORMAL MS
EKG Q-T INTERVAL: 336 MS
EKG Q-T INTERVAL: 390 MS
EKG QRS DURATION: 102 MS
EKG QRS DURATION: 106 MS
EKG QTC CALCULATION (BAZETT): 412 MS
EKG QTC CALCULATION (BAZETT): 442 MS
EKG T AXIS: 162 DEGREES
EKG T AXIS: 177 DEGREES
EOSINOPHIL # BLD: 0.2 K/UL (ref 0–0.6)
EOSINOPHIL NFR BLD: 2.4 % (ref 0–5)
ERYTHROCYTE [DISTWIDTH] IN BLOOD BY AUTOMATED COUNT: 13.2 % (ref 11.5–14.5)
GLUCOSE BLD-MCNC: 106 MG/DL (ref 70–99)
GLUCOSE BLD-MCNC: 108 MG/DL (ref 70–99)
GLUCOSE BLD-MCNC: 110 MG/DL (ref 70–99)
GLUCOSE BLD-MCNC: 114 MG/DL (ref 70–99)
GLUCOSE SERPL-MCNC: 107 MG/DL (ref 70–99)
HBA1C MFR BLD: 6 % (ref 4–5.6)
HCT VFR BLD AUTO: 48 % (ref 42–52)
HGB BLD-MCNC: 15.3 G/DL (ref 14–18)
IMM GRANULOCYTES # BLD: 0 K/UL
INR PPP: 1.04 (ref 0.88–1.18)
LYMPHOCYTES # BLD: 1.3 K/UL (ref 1.1–4.5)
LYMPHOCYTES NFR BLD: 18.9 % (ref 20–40)
MCH RBC QN AUTO: 31 PG (ref 27–31)
MCHC RBC AUTO-ENTMCNC: 31.9 G/DL (ref 33–37)
MCV RBC AUTO: 97.2 FL (ref 80–94)
MONOCYTES # BLD: 0.7 K/UL (ref 0–0.9)
MONOCYTES NFR BLD: 9.9 % (ref 0–10)
NEUTROPHILS # BLD: 4.8 K/UL (ref 1.5–7.5)
NEUTS SEG NFR BLD: 67.8 % (ref 50–65)
PERFORMED ON: ABNORMAL
PHOSPHATE SERPL-MCNC: 3.7 MG/DL (ref 2.5–4.5)
PLATELET # BLD AUTO: 148 K/UL (ref 130–400)
PMV BLD AUTO: 10.7 FL (ref 9.4–12.4)
POTASSIUM SERPL-SCNC: 4.3 MMOL/L (ref 3.5–5)
PROT SERPL-MCNC: 6.3 G/DL (ref 6.4–8.3)
PROTHROMBIN TIME: 13.3 SEC (ref 12–14.6)
RBC # BLD AUTO: 4.94 M/UL (ref 4.7–6.1)
SODIUM SERPL-SCNC: 140 MMOL/L (ref 136–145)
WBC # BLD AUTO: 7.1 K/UL (ref 4.8–10.8)

## 2024-12-17 PROCEDURE — 2140000000 HC CCU INTERMEDIATE R&B

## 2024-12-17 PROCEDURE — 83036 HEMOGLOBIN GLYCOSYLATED A1C: CPT

## 2024-12-17 PROCEDURE — 93005 ELECTROCARDIOGRAM TRACING: CPT | Performed by: INTERNAL MEDICINE

## 2024-12-17 PROCEDURE — 2580000003 HC RX 258: Performed by: STUDENT IN AN ORGANIZED HEALTH CARE EDUCATION/TRAINING PROGRAM

## 2024-12-17 PROCEDURE — 93010 ELECTROCARDIOGRAM REPORT: CPT | Performed by: INTERNAL MEDICINE

## 2024-12-17 PROCEDURE — 82962 GLUCOSE BLOOD TEST: CPT

## 2024-12-17 PROCEDURE — 84100 ASSAY OF PHOSPHORUS: CPT

## 2024-12-17 PROCEDURE — 93005 ELECTROCARDIOGRAM TRACING: CPT | Performed by: STUDENT IN AN ORGANIZED HEALTH CARE EDUCATION/TRAINING PROGRAM

## 2024-12-17 PROCEDURE — 6360000002 HC RX W HCPCS: Performed by: INTERNAL MEDICINE

## 2024-12-17 PROCEDURE — 80053 COMPREHEN METABOLIC PANEL: CPT

## 2024-12-17 PROCEDURE — 85025 COMPLETE CBC W/AUTO DIFF WBC: CPT

## 2024-12-17 PROCEDURE — 85610 PROTHROMBIN TIME: CPT

## 2024-12-17 PROCEDURE — 2500000003 HC RX 250 WO HCPCS: Performed by: STUDENT IN AN ORGANIZED HEALTH CARE EDUCATION/TRAINING PROGRAM

## 2024-12-17 PROCEDURE — 6370000000 HC RX 637 (ALT 250 FOR IP): Performed by: INTERNAL MEDICINE

## 2024-12-17 PROCEDURE — 6370000000 HC RX 637 (ALT 250 FOR IP): Performed by: STUDENT IN AN ORGANIZED HEALTH CARE EDUCATION/TRAINING PROGRAM

## 2024-12-17 PROCEDURE — 92960 CARDIOVERSION ELECTRIC EXT: CPT | Performed by: INTERNAL MEDICINE

## 2024-12-17 PROCEDURE — 5A2204Z RESTORATION OF CARDIAC RHYTHM, SINGLE: ICD-10-PCS | Performed by: INTERNAL MEDICINE

## 2024-12-17 PROCEDURE — 2709999900 HC NON-CHARGEABLE SUPPLY: Performed by: INTERNAL MEDICINE

## 2024-12-17 PROCEDURE — 36415 COLL VENOUS BLD VENIPUNCTURE: CPT

## 2024-12-17 RX ORDER — MIDAZOLAM HYDROCHLORIDE 1 MG/ML
INJECTION, SOLUTION INTRAMUSCULAR; INTRAVENOUS PRN
Status: DISCONTINUED | OUTPATIENT
Start: 2024-12-17 | End: 2024-12-17 | Stop reason: HOSPADM

## 2024-12-17 RX ORDER — SODIUM CHLORIDE 9 MG/ML
INJECTION, SOLUTION INTRAVENOUS CONTINUOUS
Status: CANCELLED | OUTPATIENT
Start: 2024-12-17

## 2024-12-17 RX ORDER — FENTANYL CITRATE 50 UG/ML
INJECTION, SOLUTION INTRAMUSCULAR; INTRAVENOUS PRN
Status: DISCONTINUED | OUTPATIENT
Start: 2024-12-17 | End: 2024-12-17 | Stop reason: HOSPADM

## 2024-12-17 RX ADMIN — GABAPENTIN 300 MG: 300 CAPSULE ORAL at 08:04

## 2024-12-17 RX ADMIN — APIXABAN 5 MG: 5 TABLET, FILM COATED ORAL at 20:23

## 2024-12-17 RX ADMIN — GABAPENTIN 300 MG: 300 CAPSULE ORAL at 20:23

## 2024-12-17 RX ADMIN — LEVOTHYROXINE SODIUM 25 MCG: 25 TABLET ORAL at 05:22

## 2024-12-17 RX ADMIN — SODIUM CHLORIDE, PRESERVATIVE FREE 10 ML: 5 INJECTION INTRAVENOUS at 08:04

## 2024-12-17 RX ADMIN — SODIUM CHLORIDE, PRESERVATIVE FREE 10 ML: 5 INJECTION INTRAVENOUS at 20:26

## 2024-12-17 RX ADMIN — Medication 200 MG: at 20:23

## 2024-12-17 RX ADMIN — PANTOPRAZOLE SODIUM 40 MG: 40 TABLET, DELAYED RELEASE ORAL at 05:22

## 2024-12-17 RX ADMIN — DILTIAZEM HYDROCHLORIDE 120 MG: 120 CAPSULE, COATED, EXTENDED RELEASE ORAL at 08:04

## 2024-12-17 RX ADMIN — AMIODARONE HYDROCHLORIDE 200 MG: 200 TABLET ORAL at 08:04

## 2024-12-17 RX ADMIN — AMIODARONE HYDROCHLORIDE 200 MG: 200 TABLET ORAL at 20:24

## 2024-12-17 RX ADMIN — ALLOPURINOL 300 MG: 300 TABLET ORAL at 08:04

## 2024-12-17 RX ADMIN — ASPIRIN 81 MG: 81 TABLET, COATED ORAL at 20:24

## 2024-12-17 RX ADMIN — BUMETANIDE 1 MG: 1 TABLET ORAL at 20:24

## 2024-12-17 RX ADMIN — METOPROLOL TARTRATE 25 MG: 25 TABLET, FILM COATED ORAL at 08:04

## 2024-12-17 RX ADMIN — Medication 200 MG: at 08:04

## 2024-12-17 RX ADMIN — METOPROLOL TARTRATE 25 MG: 25 TABLET, FILM COATED ORAL at 20:23

## 2024-12-17 RX ADMIN — BUMETANIDE 1 MG: 1 TABLET ORAL at 08:04

## 2024-12-17 NOTE — PROGRESS NOTES
Cardiology Daily Note Nicholas Liang MD      Patient:  Yuan Mejias  [id number]    Patient Active Problem List    Diagnosis Date Noted    Pacemaker     Chest pain     CAD (coronary artery disease)     New onset atrial fibrillation (HCC) 12/16/2024    Paroxysmal A-fib (HCC) 03/13/2022    Tachycardia induced cardiomyopathy (HCC) 03/12/2022    Chronic bilateral low back pain with right-sided sciatica 01/16/2020    Valvular heart disease 03/26/2019    Chronic diastolic CHF (congestive heart failure) (HCC) 03/26/2019    S/P aortic valve replacement with bioprosthetic valve 07/24/2017    S/P AVR 08/09/2016    History of coronary artery stent placement 08/09/2016    Hyperlipemia     Diabetes mellitus (HCC)     Aortic stenosis     Obesity 05/21/2013    Hypertension 03/23/2012    Family history of early CAD 03/23/2012    Symptomatic bradycardia 04/13/2022       Admit Date:  12/16/2024    Admission Problem List: Present on Admission:   New onset atrial fibrillation (HCC)      Cardiac Specific Data:  Specialty Problems          Cardiology Problems    CAD (coronary artery disease)        Chest pain        Pacemaker        Hypertension        Aortic stenosis        Hyperlipemia        Chronic diastolic CHF (congestive heart failure) (HCC)        Valvular heart disease        Tachycardia induced cardiomyopathy (HCC)        Paroxysmal A-fib (HCC)        * (Principal) New onset atrial fibrillation (HCC)        Symptomatic bradycardia           Subjective:  Mr. Mejias seen today resting comfortably blood pressure 106/69 heart 74.  Device interrogation indicates persistent atrial fibrillation/flutter onset 20 to 30 hours on 12/15/2024.  No new complaints or issues otherwise reported.  Will plan on cardioversion today.    Objective:   /69   Pulse 74   Temp 97 °F (36.1 °C) (Temporal)   Resp 16   Ht 1.778 m (5' 10\")   SpO2 94%   BMI 36.73 kg/m²       Intake/Output Summary (Last 24 hours) at 12/17/2024 0818  Last data filed  12/17/24  0203   PROTIME 13.3   INR 1.04     APTT: No results for input(s): \"APTT\" in the last 72 hours.  BNP:  No results for input(s): \"BNP\" in the last 72 hours.  CK, CKMB, Troponin: @LABRCNT (CKTOTAL:3, CKMB:3, TROPONINI:3)@    IMAGING:  NM LUNG SCAN PERFUSION ONLY    Result Date: 12/16/2024  EXAM: PERFUSION LUNG SCAN.  DATE: 12/16/2024.  COMPARISON: Chest x-ray Performed 12/16/2024.  HISTORY: Elevated D-dimer.  TECHNIQUE: The patient was injected with 5.4 mCi of Tc99m MAA and a perfusion lung scan was performed.  FINDINGS: There is a normal and physiologic distribution of the radiotracer.      Normal perfusion lung scan.   ______________________________________ Electronically signed by: PETER LYNN M.D. Date:     12/16/2024 Time:    16:32     XR CHEST (2 VW)    Result Date: 12/16/2024  EXAM: CHEST RADIOGRAPH  TECHNIQUE: Frontal and lateral views of the chest.  HISTORY: Palpitations  COMPARISON: 12/16/2024  FINDINGS: Mild bibasilar atelectasis.  The lungs are otherwise clear without focal consolidation.  No pleural effusion or pneumothorax.  The cardiomediastinal silhouette, radha and pulmonary vascular markings are within normal limits.  Left chest wall pacemaker with dual leads in unchanged position.  Median sternotomy wires and mediastinal surgical clips are present.  No acute osseous abnormality.      1.  No acute cardiopulmonary process.  Bibasilar atelectasis.    ______________________________________ Electronically signed by: ROBERTO CARDOZA M.D. Date:     12/16/2024 Time:    07:58     XR CHEST PORTABLE    Result Date: 12/16/2024  EXAM: CHEST RADIOGRAPH  TECHNIQUE: Single frontal chest radiograph.  HISTORY: Shortness of breath.  COMPARISON: None.  FINDINGS:  The patient is mildly leaning to the left.  A left subclavian dual lead cardiac device is in place.  Patient is status post sternotomy.  EKG leads project over the chest. Mild atelectasis of the left base. No pleural effusion or pneumothorax is seen.

## 2024-12-17 NOTE — PROGRESS NOTES
Hospitalist Progress Note        PCP: Desmond Mayer MD    Date of Admission: 12/16/2024    Length of Stay: 1    Chief Complaint: palpitations.       Pt with Hx of CAD and aortic valve (bioprosthetic) replacement, also hx of SSS s/p pacemaker placement in 2022, presented from home with acute onset palpitations associated with lightheaded feeling and also dyspnea, but no CP. These symptoms started yesterday am upon awakening.     Pt denies any illness in the past few weeks otherwise.            Subjective: feels better today. Reports lightheaded episodes resolved.         Medications:  Reviewed    Infusion Medications    dilTIAZem 125 mg in sodium chloride 0.9 % 125 mL infusion Stopped (12/16/24 2126)    sodium chloride      dextrose       Scheduled Medications    allopurinol  300 mg Oral Daily    aspirin  81 mg Oral Nightly    atorvastatin  40 mg Oral Every Other Day    bumetanide  1 mg Oral BID    coenzyme Q10  200 mg Oral BID    gabapentin  300 mg Oral BID    levothyroxine  25 mcg Oral Daily    [Held by provider] isosorbide mononitrate  30 mg Oral Daily    pantoprazole  40 mg Oral QAM AC    [Held by provider] spironolactone  25 mg Oral BID    [Held by provider] amLODIPine  5 mg Oral Daily    sodium chloride flush  5-40 mL IntraVENous 2 times per day    apixaban  5 mg Oral BID    insulin lispro  0-4 Units SubCUTAneous 4x Daily AC & HS    dilTIAZem  120 mg Oral Daily    metoprolol tartrate  25 mg Oral BID    amiodarone  200 mg Oral BID     PRN Meds: sodium chloride flush, sodium chloride, potassium chloride **OR** potassium alternative oral replacement **OR** potassium chloride, magnesium sulfate, ondansetron **OR** ondansetron, polyethylene glycol, acetaminophen **OR** acetaminophen, glucose, dextrose bolus **OR** dextrose bolus, glucagon (rDNA), dextrose      Intake/Output Summary (Last 24 hours) at 12/17/2024 1311  Last data filed at 12/17/2024 1039  Gross per 24 hour   Intake --   Output 0 ml   Net 0 ml  perfusion lung scan.           ______________________________________    Electronically signed by: PETER LYNN M.D.   Date:     12/16/2024   Time:    16:32       XR CHEST (2 VW)   Final Result   1.  No acute cardiopulmonary process.  Bibasilar atelectasis.               ______________________________________    Electronically signed by: ROBERTO CARDOZA M.D.   Date:     12/16/2024   Time:    07:58       XR CHEST PORTABLE   Final Result       1.  No acute findings in the chest.               ______________________________________    Electronically signed by: ONEIL GARCIA M.D.   Date:     12/16/2024   Time:    06:07           IP CONSULT TO CARDIOLOGY    Assessment/Plan:    Active Hospital Problems    Diagnosis     New onset atrial fibrillation (HCC) [I48.91]        New Onset SVT possibly Afib.   Know SSS s/p pacemaker placement with hx of significant bradycardia.   Started on eliquis on admission  Cardiology eval -    - noted NPO and tentative plan for JONNY/cardioversion today      SOB.   Tachycardia as above.   CXR clear.   Ddimer low level elevation.   VQ - low probability for PE with normal perfusion lung scan      CKDIIIB stable.   Avoid nephrotoxic agents.             DVT Prophylaxis: eliquis  Diet: ADULT DIET; Regular; 4 carb choices (60 gm/meal); Low Fat/Low Chol/High Fiber/LEYDA; No Caffeine  Code Status: Full Code      Dispo - cc        Octavia Skelton MD

## 2024-12-18 ENCOUNTER — APPOINTMENT (OUTPATIENT)
Age: 79
End: 2024-12-18
Payer: OTHER GOVERNMENT

## 2024-12-18 VITALS
SYSTOLIC BLOOD PRESSURE: 122 MMHG | DIASTOLIC BLOOD PRESSURE: 79 MMHG | BODY MASS INDEX: 35.72 KG/M2 | RESPIRATION RATE: 16 BRPM | WEIGHT: 249.5 LBS | OXYGEN SATURATION: 92 % | TEMPERATURE: 97.3 F | HEART RATE: 77 BPM | HEIGHT: 70 IN

## 2024-12-18 LAB
ALBUMIN SERPL-MCNC: 4 G/DL (ref 3.5–5.2)
ANION GAP SERPL CALCULATED.3IONS-SCNC: 13 MMOL/L (ref 7–19)
BASOPHILS # BLD: 0 K/UL (ref 0–0.2)
BASOPHILS NFR BLD: 0.6 % (ref 0–1)
BUN SERPL-MCNC: 29 MG/DL (ref 8–23)
CALCIUM SERPL-MCNC: 9.1 MG/DL (ref 8.8–10.2)
CHLORIDE SERPL-SCNC: 100 MMOL/L (ref 98–111)
CO2 SERPL-SCNC: 25 MMOL/L (ref 22–29)
CREAT SERPL-MCNC: 1.7 MG/DL (ref 0.7–1.2)
EKG P AXIS: -67 DEGREES
EKG P-R INTERVAL: 124 MS
EKG Q-T INTERVAL: 418 MS
EKG QRS DURATION: 108 MS
EKG QTC CALCULATION (BAZETT): 433 MS
EKG T AXIS: 135 DEGREES
EOSINOPHIL # BLD: 0.2 K/UL (ref 0–0.6)
EOSINOPHIL NFR BLD: 2.8 % (ref 0–5)
ERYTHROCYTE [DISTWIDTH] IN BLOOD BY AUTOMATED COUNT: 13.1 % (ref 11.5–14.5)
GLUCOSE BLD-MCNC: 101 MG/DL (ref 70–99)
GLUCOSE SERPL-MCNC: 95 MG/DL (ref 70–99)
HCT VFR BLD AUTO: 47.7 % (ref 42–52)
HGB BLD-MCNC: 15.5 G/DL (ref 14–18)
IMM GRANULOCYTES # BLD: 0 K/UL
LYMPHOCYTES # BLD: 1.2 K/UL (ref 1.1–4.5)
LYMPHOCYTES NFR BLD: 17.9 % (ref 20–40)
MAGNESIUM SERPL-MCNC: 2.2 MG/DL (ref 1.6–2.4)
MCH RBC QN AUTO: 31.1 PG (ref 27–31)
MCHC RBC AUTO-ENTMCNC: 32.5 G/DL (ref 33–37)
MCV RBC AUTO: 95.6 FL (ref 80–94)
MONOCYTES # BLD: 0.6 K/UL (ref 0–0.9)
MONOCYTES NFR BLD: 9.8 % (ref 0–10)
NEUTROPHILS # BLD: 4.4 K/UL (ref 1.5–7.5)
NEUTS SEG NFR BLD: 68.6 % (ref 50–65)
PERFORMED ON: ABNORMAL
PHOSPHATE SERPL-MCNC: 3.4 MG/DL (ref 2.5–4.5)
PLATELET # BLD AUTO: 168 K/UL (ref 130–400)
PMV BLD AUTO: 10.2 FL (ref 9.4–12.4)
POTASSIUM SERPL-SCNC: 4.2 MMOL/L (ref 3.5–5)
RBC # BLD AUTO: 4.99 M/UL (ref 4.7–6.1)
SODIUM SERPL-SCNC: 138 MMOL/L (ref 136–145)
WBC # BLD AUTO: 6.4 K/UL (ref 4.8–10.8)

## 2024-12-18 PROCEDURE — 93010 ELECTROCARDIOGRAM REPORT: CPT | Performed by: INTERNAL MEDICINE

## 2024-12-18 PROCEDURE — 6370000000 HC RX 637 (ALT 250 FOR IP): Performed by: INTERNAL MEDICINE

## 2024-12-18 PROCEDURE — 94760 N-INVAS EAR/PLS OXIMETRY 1: CPT

## 2024-12-18 PROCEDURE — 93246 EXT ECG>7D<15D RECORDING: CPT

## 2024-12-18 PROCEDURE — 36415 COLL VENOUS BLD VENIPUNCTURE: CPT

## 2024-12-18 PROCEDURE — 99222 1ST HOSP IP/OBS MODERATE 55: CPT | Performed by: INTERNAL MEDICINE

## 2024-12-18 PROCEDURE — 83735 ASSAY OF MAGNESIUM: CPT

## 2024-12-18 PROCEDURE — 80069 RENAL FUNCTION PANEL: CPT

## 2024-12-18 PROCEDURE — 6370000000 HC RX 637 (ALT 250 FOR IP): Performed by: STUDENT IN AN ORGANIZED HEALTH CARE EDUCATION/TRAINING PROGRAM

## 2024-12-18 PROCEDURE — 85025 COMPLETE CBC W/AUTO DIFF WBC: CPT

## 2024-12-18 PROCEDURE — 2500000003 HC RX 250 WO HCPCS: Performed by: STUDENT IN AN ORGANIZED HEALTH CARE EDUCATION/TRAINING PROGRAM

## 2024-12-18 PROCEDURE — 82962 GLUCOSE BLOOD TEST: CPT

## 2024-12-18 RX ORDER — METOPROLOL TARTRATE 25 MG/1
25 TABLET, FILM COATED ORAL 2 TIMES DAILY
Qty: 60 TABLET | Refills: 3 | Status: SHIPPED | OUTPATIENT
Start: 2024-12-18

## 2024-12-18 RX ORDER — DILTIAZEM HYDROCHLORIDE 120 MG/1
120 CAPSULE, COATED, EXTENDED RELEASE ORAL DAILY
Qty: 30 CAPSULE | Refills: 3 | Status: SHIPPED | OUTPATIENT
Start: 2024-12-19

## 2024-12-18 RX ORDER — METOPROLOL SUCCINATE 50 MG/1
50 TABLET, EXTENDED RELEASE ORAL DAILY
Status: DISCONTINUED | OUTPATIENT
Start: 2024-12-18 | End: 2024-12-18 | Stop reason: HOSPADM

## 2024-12-18 RX ORDER — AMIODARONE HYDROCHLORIDE 200 MG/1
TABLET ORAL
Qty: 60 TABLET | Refills: 1 | Status: SHIPPED | OUTPATIENT
Start: 2024-12-18

## 2024-12-18 RX ADMIN — PANTOPRAZOLE SODIUM 40 MG: 40 TABLET, DELAYED RELEASE ORAL at 05:32

## 2024-12-18 RX ADMIN — AMIODARONE HYDROCHLORIDE 200 MG: 200 TABLET ORAL at 08:05

## 2024-12-18 RX ADMIN — DILTIAZEM HYDROCHLORIDE 120 MG: 120 CAPSULE, COATED, EXTENDED RELEASE ORAL at 08:05

## 2024-12-18 RX ADMIN — Medication 200 MG: at 08:05

## 2024-12-18 RX ADMIN — ATORVASTATIN CALCIUM 40 MG: 40 TABLET, FILM COATED ORAL at 08:05

## 2024-12-18 RX ADMIN — APIXABAN 5 MG: 5 TABLET, FILM COATED ORAL at 08:05

## 2024-12-18 RX ADMIN — BUMETANIDE 1 MG: 1 TABLET ORAL at 08:05

## 2024-12-18 RX ADMIN — ALLOPURINOL 300 MG: 300 TABLET ORAL at 08:05

## 2024-12-18 RX ADMIN — LEVOTHYROXINE SODIUM 25 MCG: 25 TABLET ORAL at 05:32

## 2024-12-18 RX ADMIN — SODIUM CHLORIDE, PRESERVATIVE FREE 10 ML: 5 INJECTION INTRAVENOUS at 08:05

## 2024-12-18 RX ADMIN — GABAPENTIN 300 MG: 300 CAPSULE ORAL at 08:05

## 2024-12-18 RX ADMIN — METOPROLOL TARTRATE 25 MG: 25 TABLET, FILM COATED ORAL at 08:05

## 2024-12-18 NOTE — PROGRESS NOTES
Physician Progress Note      PATIENT:               CHRISTIANE SALDANA  [facility] #:                  [id number]  :                       1945  ADMIT DATE:       2024 3:38 AM  DISCH DATE:        2024 10:28 AM  RESPONDING  PROVIDER #:        Octavia Skelton MD          QUERY TEXT:    Patient admitted with new on set AFIB. Pt. Noted to be started on Eliquis this   visit. If possible, please document in progress notes and discharge summary   if you are evaluating and/or treating any of the following:?  ?  The medical record reflects the following:  Risk Factors: CAD, Aortic Valve replacement  Clinical Indicators: Male patient, Age: 78, H&P Documents CHA2D S2-VASc of 5\"  Treatment: Eliquis, Cardioversion.  Options provided:  -- Secondary hypercoagulable state in a patient with atrial fibrillation  -- Other - I will add my own diagnosis  -- Disagree - Not applicable / Not valid  -- Disagree - Clinically unable to determine / Unknown  -- Refer to Clinical Documentation Reviewer    PROVIDER RESPONSE TEXT:    This patient has secondary hypercoagulable state in a patient with atrial   fibrillation.    Query created by: Mary Grace Gusman on 2024 11:50 AM      Electronically signed by:  Octavia Skelton MD 2024 3:01 PM

## 2024-12-18 NOTE — CONSULTS
Cardiology Consult Note    ADMISSION DAY:  12/16/2024  3:38 AM   Consult Date:  12/18/2024 9:47 AM    Reason for Consultation: AF / AFl    History of Present Illness: 77 yo with CAD and AoV replacement presented with tachycardia which induced dizziness and dyspnea.    Allergies   Allergen Reactions    Nsaids Other (See Comments)    Plasma Protein Fraction Other (See Comments)       Current Medications  Current Facility-Administered Medications: metoprolol succinate (TOPROL XL) extended release tablet 50 mg, 50 mg, Oral, Daily  allopurinol (ZYLOPRIM) tablet 300 mg, 300 mg, Oral, Daily  aspirin EC tablet 81 mg, 81 mg, Oral, Nightly  atorvastatin (LIPITOR) tablet 40 mg, 40 mg, Oral, Every Other Day  bumetanide (BUMEX) tablet 1 mg, 1 mg, Oral, BID  coenzyme Q10 capsule 200 mg, 200 mg, Oral, BID  gabapentin (NEURONTIN) capsule 300 mg, 300 mg, Oral, BID  levothyroxine (SYNTHROID) tablet 25 mcg, 25 mcg, Oral, Daily  pantoprazole (PROTONIX) tablet 40 mg, 40 mg, Oral, QAM AC  sodium chloride flush 0.9 % injection 5-40 mL, 5-40 mL, IntraVENous, 2 times per day  sodium chloride flush 0.9 % injection 5-40 mL, 5-40 mL, IntraVENous, PRN  0.9 % sodium chloride infusion, , IntraVENous, PRN  potassium chloride (KLOR-CON M) extended release tablet 40 mEq, 40 mEq, Oral, PRN **OR** potassium bicarb-citric acid (EFFER-K) effervescent tablet 40 mEq, 40 mEq, Oral, PRN **OR** potassium chloride 10 mEq/100 mL IVPB (Peripheral Line), 10 mEq, IntraVENous, PRN  magnesium sulfate 2000 mg in 50 mL IVPB premix, 2,000 mg, IntraVENous, PRN  ondansetron (ZOFRAN-ODT) disintegrating tablet 4 mg, 4 mg, Oral, Q8H PRN **OR** ondansetron (ZOFRAN) injection 4 mg, 4 mg, IntraVENous, Q6H PRN  polyethylene glycol (GLYCOLAX) packet 17 g, 17 g, Oral, Daily PRN  acetaminophen (TYLENOL) tablet 650 mg, 650 mg, Oral, Q6H PRN **OR** acetaminophen (TYLENOL) suppository 650 mg, 650 mg, Rectal, Q6H PRN  apixaban (ELIQUIS) tablet 5 mg, 5 mg, Oral, BID  insulin lispro

## 2024-12-18 NOTE — PLAN OF CARE
Problem: Chronic Conditions and Co-morbidities  Goal: Patient's chronic conditions and co-morbidity symptoms are monitored and maintained or improved  Outcome: Progressing  Flowsheets (Taken 12/17/2024 2218)  Care Plan - Patient's Chronic Conditions and Co-Morbidity Symptoms are Monitored and Maintained or Improved:   Monitor and assess patient's chronic conditions and comorbid symptoms for stability, deterioration, or improvement   Collaborate with multidisciplinary team to address chronic and comorbid conditions and prevent exacerbation or deterioration   Update acute care plan with appropriate goals if chronic or comorbid symptoms are exacerbated and prevent overall improvement and discharge     Problem: Discharge Planning  Goal: Discharge to home or other facility with appropriate resources  Outcome: Progressing  Flowsheets (Taken 12/17/2024 2218)  Discharge to home or other facility with appropriate resources:   Identify barriers to discharge with patient and caregiver   Arrange for needed discharge resources and transportation as appropriate   Identify discharge learning needs (meds, wound care, etc)   Refer to discharge planning if patient needs post-hospital services based on physician order or complex needs related to functional status, cognitive ability or social support system     Problem: Safety - Adult  Goal: Free from fall injury  Outcome: Progressing

## 2024-12-18 NOTE — DISCHARGE SUMMARY
deformity. Pupils equal, round, and reactive to light.  Extra ocular muscles intact. Conjunctivae/corneas clear.  Neck: Supple, with full range of motion. No jugular venous distention. Trachea midline.  Respiratory:  Normal respiratory effort. Clear to auscultation, bilaterally without Rales/Wheezes/Rhonchi.  Cardiovascular:  Regular rate and rhythm with normal S1/S2 without murmurs, rubs or gallops.  Abdomen: Soft, non-tender, non-distended with normal bowel sounds.  Musculoskeletal:  No clubbing, cyanosis or edema bilaterally.  Full range of motion without deformity.  Skin: Skin color, texture, turgor normal.  No rashes or lesions.  Neurologic:  Neurovascularly intact without any focal sensory/motor deficits. Cranial nerves: II-XII intact, grossly non-focal.  Psychiatric:  Alert and oriented, thought content appropriate, normal insight  Capillary Refill: Brisk,< 3 seconds   Peripheral Pulses: +2 palpable, equal bilaterally       Labs: For convenience and continuity at follow-up the following most recent labs are provided:      CBC:    Lab Results   Component Value Date/Time    WBC 6.4 12/18/2024 07:31 AM    HGB 15.5 12/18/2024 07:31 AM    HCT 47.7 12/18/2024 07:31 AM    HCT 39.6 03/26/2012 11:18 AM     12/18/2024 07:31 AM     03/26/2012 11:18 AM       Renal:    Lab Results   Component Value Date/Time     12/18/2024 07:31 AM     03/26/2012 11:18 AM    K 4.2 12/18/2024 07:31 AM    K 4.2 04/13/2022 08:08 AM    K 3.8 03/26/2012 11:18 AM     12/18/2024 07:31 AM     03/26/2012 11:18 AM    CO2 25 12/18/2024 07:31 AM    BUN 29 12/18/2024 07:31 AM    CREATININE 1.7 12/18/2024 07:31 AM    CREATININE 0.9 03/26/2012 11:18 AM    CALCIUM 9.1 12/18/2024 07:31 AM    PHOS 3.4 12/18/2024 07:31 AM         Significant Diagnostic Studies    Radiology:   NM LUNG SCAN PERFUSION ONLY   Final Result   Normal perfusion lung scan.           ______________________________________    Electronically signed  tablet, R-0Normal      allopurinol (ZYLOPRIM) 300 MG tablet Take 1 tablet by mouth dailyHistorical Med      Coenzyme Q10 (COQ10) 200 MG CAPS Take 200 mg by mouth in the morning and at bedtime Historical Med      bumetanide (BUMEX) 2 MG tablet Take 0.5 tablets by mouth 2 times daily 1 MG TWICE A DAYHistorical Med      atorvastatin (LIPITOR) 40 MG tablet Take 1 tablet by mouth daily., Disp-30 tablet, R-5Normal      omeprazole (PRILOSEC) 20 MG capsule Take 1 capsule by mouth dailyHistorical Med      gabapentin (NEURONTIN) 300 MG capsule Take 1 capsule by mouth in the morning and at bedtime.Historical Med             Time Spent on discharge: 35min in the examination, evaluation, counseling and review of medications and discharge plan.      Signed:    Octavia Skelton MD   12/18/2024      Thank you Desmond Mayer MD for the opportunity to be involved in this patient's care. If you have any questions or concerns, please feel free to contact me at [phone].

## 2024-12-26 ENCOUNTER — TELEPHONE (OUTPATIENT)
Dept: CARDIOLOGY CLINIC | Age: 79
End: 2024-12-26

## 2024-12-26 NOTE — TELEPHONE ENCOUNTER
Patient's wife requested samples of eliquis 5 mg. Advised we only have 2.5 mg samples and he can get those but to remember to take (2) 2.5 mg tablets twice daily to get daily dose of 5 mg BID. I have pulled 3 boxes for patient and placed at . LOT [id number] Exp Date: 3/2026.

## 2025-01-16 ENCOUNTER — OFFICE VISIT (OUTPATIENT)
Dept: CARDIOLOGY CLINIC | Age: 80
End: 2025-01-16

## 2025-01-16 ENCOUNTER — HOSPITAL ENCOUNTER (OUTPATIENT)
Dept: GENERAL RADIOLOGY | Age: 80
Discharge: HOME | End: 2025-01-16
Payer: MEDICARE

## 2025-01-16 VITALS
WEIGHT: 256 LBS | HEIGHT: 70 IN | HEART RATE: 86 BPM | BODY MASS INDEX: 36.65 KG/M2 | SYSTOLIC BLOOD PRESSURE: 128 MMHG | OXYGEN SATURATION: 97 % | DIASTOLIC BLOOD PRESSURE: 70 MMHG

## 2025-01-16 DIAGNOSIS — Z95.0 PACEMAKER: ICD-10-CM

## 2025-01-16 DIAGNOSIS — Z92.89 HISTORY OF CARDIOVERSION: ICD-10-CM

## 2025-01-16 DIAGNOSIS — E78.5 DYSLIPIDEMIA: ICD-10-CM

## 2025-01-16 DIAGNOSIS — Z95.5 H/O HEART ARTERY STENT: ICD-10-CM

## 2025-01-16 DIAGNOSIS — R00.1 BRADYCARDIA: ICD-10-CM

## 2025-01-16 DIAGNOSIS — R06.02 SHORTNESS OF BREATH: ICD-10-CM

## 2025-01-16 DIAGNOSIS — I49.5 SA NODE DYSFUNCTION (HCC): ICD-10-CM

## 2025-01-16 DIAGNOSIS — I48.91 ATRIAL FIBRILLATION, UNSPECIFIED TYPE (HCC): Primary | ICD-10-CM

## 2025-01-16 DIAGNOSIS — I38 VALVULAR HEART DISEASE: ICD-10-CM

## 2025-01-16 DIAGNOSIS — I10 ESSENTIAL HYPERTENSION: ICD-10-CM

## 2025-01-16 DIAGNOSIS — I25.10 CORONARY ARTERY DISEASE INVOLVING NATIVE CORONARY ARTERY OF NATIVE HEART WITHOUT ANGINA PECTORIS: ICD-10-CM

## 2025-01-16 DIAGNOSIS — I48.91 ATRIAL FIBRILLATION, UNSPECIFIED TYPE (HCC): ICD-10-CM

## 2025-01-16 DIAGNOSIS — Z95.2 H/O AORTIC VALVE REPLACEMENT: ICD-10-CM

## 2025-01-16 DIAGNOSIS — I48.0 PAROXYSMAL ATRIAL FIBRILLATION (HCC): ICD-10-CM

## 2025-01-16 LAB
ALBUMIN SERPL-MCNC: 4.2 G/DL (ref 3.5–5.2)
ALP SERPL-CCNC: 119 U/L (ref 40–129)
ALT SERPL-CCNC: 24 U/L (ref 5–41)
ANION GAP SERPL CALCULATED.3IONS-SCNC: 12 MMOL/L (ref 7–19)
AST SERPL-CCNC: 20 U/L (ref 5–40)
BILIRUB SERPL-MCNC: 0.6 MG/DL (ref 0.2–1.2)
BUN SERPL-MCNC: 30 MG/DL (ref 8–23)
CALCIUM SERPL-MCNC: 9 MG/DL (ref 8.8–10.2)
CHLORIDE SERPL-SCNC: 105 MMOL/L (ref 98–111)
CO2 SERPL-SCNC: 29 MMOL/L (ref 22–29)
CREAT SERPL-MCNC: 2.1 MG/DL (ref 0.7–1.2)
ERYTHROCYTE [DISTWIDTH] IN BLOOD BY AUTOMATED COUNT: 13.6 % (ref 11.5–14.5)
GLUCOSE SERPL-MCNC: 102 MG/DL (ref 70–99)
HCT VFR BLD AUTO: 45.4 % (ref 42–52)
HGB BLD-MCNC: 14.5 G/DL (ref 14–18)
MCH RBC QN AUTO: 31 PG (ref 27–31)
MCHC RBC AUTO-ENTMCNC: 31.9 G/DL (ref 33–37)
MCV RBC AUTO: 97.2 FL (ref 80–94)
PLATELET # BLD AUTO: 160 K/UL (ref 130–400)
PMV BLD AUTO: 10 FL (ref 9.4–12.4)
POTASSIUM SERPL-SCNC: 3.8 MMOL/L (ref 3.5–5)
PROT SERPL-MCNC: 6.8 G/DL (ref 6.4–8.3)
RBC # BLD AUTO: 4.67 M/UL (ref 4.7–6.1)
SODIUM SERPL-SCNC: 146 MMOL/L (ref 136–145)
T3FREE SERPL-MCNC: 2 PG/ML (ref 2–4.4)
T4 FREE SERPL-MCNC: 1.35 NG/DL (ref 0.93–1.7)
TSH SERPL DL<=0.005 MIU/L-ACNC: 5.35 UIU/ML (ref 0.27–4.2)
WBC # BLD AUTO: 6.4 K/UL (ref 4.8–10.8)

## 2025-01-16 PROCEDURE — 71046 X-RAY EXAM CHEST 2 VIEWS: CPT

## 2025-01-16 RX ORDER — CHOLECALCIFEROL (VITAMIN D3) 25 MCG
CAPSULE ORAL
COMMUNITY

## 2025-01-16 RX ORDER — ASPIRIN 81 MG/1
81 TABLET ORAL DAILY
COMMUNITY

## 2025-01-16 RX ORDER — CYANOCOBALAMIN/FOLIC AC/VIT B6 1-2.5-25MG
1 TABLET ORAL DAILY
COMMUNITY
Start: 2024-11-24

## 2025-01-16 ASSESSMENT — ENCOUNTER SYMPTOMS
EYES NEGATIVE: 1
NAUSEA: 0
DIARRHEA: 0
GASTROINTESTINAL NEGATIVE: 1
SHORTNESS OF BREATH: 0
RESPIRATORY NEGATIVE: 1
VOMITING: 0

## 2025-01-16 NOTE — PROGRESS NOTES
[facility]                            Date:  1/16/2025  Patient: Yuan Mejias  Age:  79 y.o., 1945      Reason for evaluation:         SUBJECTIVE:    Returns today follow-up assessment followed for paroxysmal atrial fibrillation chronic anticoagulation antiarrhythmic drug usage hypertension coronary artery disease hyperlipidemia previous stent valvular heart disease recent cardioversion 12/17/2024 he has been maintaining sinus rhythm since then.  Device interrogated functioning appropriately no atrial fibrillation burden reported.  Dyspnea is about the same does not take sublingual nitroglycerin denies palpitations denies chest pain.  Denies any bleeding issues.  He is anxious to have an epidural done in the near future and requested being off anticoagulation for 2 days prior to that.  I have no objections at this time.  Blood pressure 128/70 heart 86.    Review of Systems   Constitutional: Negative.  Negative for chills, fever and unexpected weight change.   HENT: Negative.     Eyes: Negative.    Respiratory: Negative.  Negative for shortness of breath.    Cardiovascular: Negative.  Negative for chest pain.   Gastrointestinal: Negative.  Negative for diarrhea, nausea and vomiting.   Endocrine: Negative.    Genitourinary: Negative.    Musculoskeletal: Negative.    Skin: Negative.    Neurological: Negative.    All other systems reviewed and are negative.        OBJECTIVE:    /70   Pulse 86   Ht 1.778 m (5' 10\")   Wt 116.1 kg (256 lb)   SpO2 97%   BMI 36.73 kg/m²     Labs:   CBC: No results for input(s): \"WBC\", \"HGB\", \"HCT\", \"PLT\" in the last 72 hours.  BMP:No results for input(s): \"NA\", \"K\", \"CO2\", \"BUN\", \"CREATININE\", \"LABGLOM\", \"GLUCOSE\" in the last 72 hours.  BNP: No results for input(s): \"BNP\" in the last 72 hours.  PT/INR: No results for input(s): \"PROTIME\", \"INR\" in the last 72 hours.  APTT:No results for input(s): \"APTT\" in the last 72 hours.  CARDIAC ENZYMES:No

## 2025-01-16 NOTE — PROGRESS NOTES
Pacemaker interrogated  Presenting rhythm:  AP/VS, AP 98%,  <0.1%  Battey voltage 10.8 years  Lead status:  Lead impedance within range and stable  Sensing:  P waves 2.1 mV,  R waves 6.0 mV  Thresholds:  Atrial 0.750 V @ 0.4ms, ventricular 0.750 V @ 0.4ms  Observations:  None  See scanned report for details  Reprogramming for sensitivity and threshold testing  Next [facility] appointment:  04/17/25

## 2025-01-21 ENCOUNTER — HOSPITAL ENCOUNTER (OUTPATIENT)
Dept: PAIN MANAGEMENT | Age: 80
Discharge: HOME | End: 2025-01-21
Payer: MEDICARE

## 2025-01-21 VITALS
SYSTOLIC BLOOD PRESSURE: 98 MMHG | TEMPERATURE: 96.8 F | OXYGEN SATURATION: 97 % | RESPIRATION RATE: 18 BRPM | HEART RATE: 86 BPM | DIASTOLIC BLOOD PRESSURE: 62 MMHG

## 2025-01-21 DIAGNOSIS — R52 PAIN MANAGEMENT: ICD-10-CM

## 2025-01-21 PROCEDURE — 6360000002 HC RX W HCPCS

## 2025-01-21 PROCEDURE — 62323 NJX INTERLAMINAR LMBR/SAC: CPT

## 2025-01-21 PROCEDURE — 2580000003 HC RX 258

## 2025-01-21 RX ORDER — TRIAMCINOLONE ACETONIDE 40 MG/ML
80 INJECTION, SUSPENSION INTRA-ARTICULAR; INTRAMUSCULAR ONCE
Status: DISCONTINUED | OUTPATIENT
Start: 2025-01-21 | End: 2025-01-23 | Stop reason: HOSPADM

## 2025-01-21 RX ORDER — LIDOCAINE HYDROCHLORIDE 10 MG/ML
5 INJECTION, SOLUTION EPIDURAL; INFILTRATION; INTRACAUDAL; PERINEURAL ONCE
Status: DISCONTINUED | OUTPATIENT
Start: 2025-01-21 | End: 2025-01-23 | Stop reason: HOSPADM

## 2025-01-21 RX ORDER — SODIUM CHLORIDE 9 MG/ML
5 INJECTION, SOLUTION INTRAMUSCULAR; INTRAVENOUS; SUBCUTANEOUS ONCE
Status: DISCONTINUED | OUTPATIENT
Start: 2025-01-21 | End: 2025-01-23 | Stop reason: HOSPADM

## 2025-01-21 ASSESSMENT — PAIN - FUNCTIONAL ASSESSMENT: PAIN_FUNCTIONAL_ASSESSMENT: NONE - DENIES PAIN

## 2025-01-21 NOTE — INTERVAL H&P NOTE
Update History & Physical    The patient's History and Physical  was reviewed with the patient and I examined the patient. There was no change. The surgical site was confirmed by the patient and me.     Plan: The risks, benefits, expected outcome, and alternative to the recommended procedure have been discussed with the patient. Patient understands and wants to proceed with the procedure.     Electronically signed by Aubrey James MD on 1/21/2025 at 12:09 PM

## 2025-01-21 NOTE — PROGRESS NOTES
Procedure:  Level of Consciousness: [x]Alert [x]Oriented []Disoriented []Lethargic  Anxiety Level: [x]Calm []Anxious []Depressed []Other  Skin: [x]Warm [x]Dry []Cool []Moist []Intact []Other  Cardiovascular: []Palpitations: []Never [x]Occasionally []Frequently  Chest Pain: [x]No []Yes  Respiratory:  [x]Unlabored []Labored []Cough ([] Productive []Unproductive)  HCG Required: []No []Yes   Results: []Negative []Positive  Knowledge Level:        [x]Patient/Other verbalized understanding of pre-procedure instructions.        [x]Assessment of post-op care needs (transportation, responsible caregiver)        [x]Able to discuss health care problems and how to deal with it.  Factors that Affect Teaching:        Language Barrier: [x]No []Yes - why:        Hearing Loss:        []No [x]Yes            Corrective Device:  [x]Yes []No        Vision Loss:           [x]No []Yes            Corrective Device:  []Yes []No        Memory Loss:       [x]No []Yes            []Short Term []Long Term  Motivational Level:  [x]Asks Questions                  []Extremely Anxious       [x]Seems Interested               []Seems Uninterested                  []Denies need for Education  Risk for Injury:  [x]Patient oriented to person, place and time  []History of frequent falls/loss of balance  Nutritional:  []Change in appetite   []Weight Gain   []Weight Loss  Functional:  []Requires assistance with ADL's

## 2025-02-07 ENCOUNTER — TELEPHONE (OUTPATIENT)
Dept: CARDIOLOGY CLINIC | Age: 80
End: 2025-02-07

## 2025-02-07 RX ORDER — BUMETANIDE 0.5 MG/1
0.5 TABLET ORAL 2 TIMES DAILY
Qty: 60 TABLET | Refills: 3 | Status: SHIPPED | OUTPATIENT
Start: 2025-02-07

## 2025-02-07 NOTE — TELEPHONE ENCOUNTER
Patients wife called stating patients BP has been running low and it's causing him dizziness. BP today before medicine 113/74 74 and yesterday after he took medication 92/64, she didn't have HR. He is taking bumex 2 mg- 1/2 tablet BID, diltiazem 120 mg AM, and metoprolol 25 mg BID.

## 2025-02-07 NOTE — TELEPHONE ENCOUNTER
Spoke with patient's wife and advised to decrease bumex to 0.5 mg BID. New med order placed to [facility] in [place]. Patients wife voiced understanding.

## 2025-03-13 ENCOUNTER — TELEPHONE (OUTPATIENT)
Dept: CARDIOLOGY CLINIC | Age: 80
End: 2025-03-13

## 2025-03-13 NOTE — TELEPHONE ENCOUNTER
Spoke with patient's wife and advised per Radha patient will need to go to ER for eval. She said \"ok I will tell him but I don't know if he will go\"

## 2025-03-13 NOTE — TELEPHONE ENCOUNTER
Patent's wife called to report that patient is having issues with swelling to his testicles/upper legs and SOB today. It has been going on for a few days but its worse today. He weighs daily but doesn't write it down. He was 254 today and on 3/1/25 250. You decreased bumex to 0.5 mg BID due to low BP and dizziness on 2/7/2025. Dr. Mayer also decreased his metoprolol from 25 mg BID to 12.5 mg BID on 2/26/25 due to dizziness. BP today before medicine 109/80 and I had them recheck during call at 12:17 PM it was 119/81. She said he doesn't re-salt anything, he had some fish at an outing this week that may have been salty, otherwise low sodium diet. Advised I would see what you wanted to do.

## 2025-03-25 ENCOUNTER — OFFICE VISIT (OUTPATIENT)
Dept: CARDIOLOGY CLINIC | Age: 80
End: 2025-03-25
Payer: OTHER GOVERNMENT

## 2025-03-25 ENCOUNTER — HOSPITAL ENCOUNTER (OUTPATIENT)
Dept: PULMONOLOGY | Age: 80
Discharge: HOME | End: 2025-03-25
Attending: INTERNAL MEDICINE

## 2025-03-25 VITALS
HEART RATE: 76 BPM | HEIGHT: 69 IN | BODY MASS INDEX: 37.77 KG/M2 | WEIGHT: 255 LBS | DIASTOLIC BLOOD PRESSURE: 66 MMHG | OXYGEN SATURATION: 97 % | RESPIRATION RATE: 18 BRPM | SYSTOLIC BLOOD PRESSURE: 94 MMHG

## 2025-03-25 VITALS
HEART RATE: 74 BPM | DIASTOLIC BLOOD PRESSURE: 80 MMHG | HEIGHT: 69 IN | OXYGEN SATURATION: 97 % | SYSTOLIC BLOOD PRESSURE: 120 MMHG | WEIGHT: 258 LBS | BODY MASS INDEX: 38.21 KG/M2

## 2025-03-25 DIAGNOSIS — I48.91 ATRIAL FIBRILLATION, UNSPECIFIED TYPE (HCC): Primary | ICD-10-CM

## 2025-03-25 DIAGNOSIS — I25.10 CORONARY ARTERY DISEASE INVOLVING NATIVE CORONARY ARTERY OF NATIVE HEART WITHOUT ANGINA PECTORIS: ICD-10-CM

## 2025-03-25 DIAGNOSIS — I10 ESSENTIAL HYPERTENSION: ICD-10-CM

## 2025-03-25 DIAGNOSIS — I48.0 PAF (PAROXYSMAL ATRIAL FIBRILLATION) (HCC): Primary | ICD-10-CM

## 2025-03-25 DIAGNOSIS — E78.5 DYSLIPIDEMIA: ICD-10-CM

## 2025-03-25 DIAGNOSIS — I38 VALVULAR HEART DISEASE: ICD-10-CM

## 2025-03-25 PROCEDURE — 3079F DIAST BP 80-89 MM HG: CPT | Performed by: NURSE PRACTITIONER

## 2025-03-25 PROCEDURE — 1123F ACP DISCUSS/DSCN MKR DOCD: CPT | Performed by: NURSE PRACTITIONER

## 2025-03-25 PROCEDURE — 99214 OFFICE O/P EST MOD 30 MIN: CPT | Performed by: NURSE PRACTITIONER

## 2025-03-25 PROCEDURE — 3074F SYST BP LT 130 MM HG: CPT | Performed by: NURSE PRACTITIONER

## 2025-03-25 RX ORDER — ALBUTEROL SULFATE 0.83 MG/ML
2.5 SOLUTION RESPIRATORY (INHALATION) 2 TIMES DAILY PRN
Status: DISCONTINUED | OUTPATIENT
Start: 2025-03-25 | End: 2025-03-27 | Stop reason: HOSPADM

## 2025-03-25 ASSESSMENT — ENCOUNTER SYMPTOMS
CHEST TIGHTNESS: 0
WHEEZING: 0
COUGH: 0
SORE THROAT: 0
SHORTNESS OF BREATH: 0

## 2025-03-25 NOTE — PROGRESS NOTES
Patient stated that we needed to get his BP in left arm due to falling last Monday. Patient's vitals were taken at prior to his test. Patient's BP was 84/58 manually and 93/63 done automatically. Patient was given a bottle of water while we waited to retake his BP. I asked if he felt ok. He stated that he did.   When taken again manually his BP was 94/66. I explained that his BP was low and suggested that he be seen in the ER. He stated that he would not go. He stated that he has been getting dizzy recently and has had medication changes via his PCP.  I asked if it was ok to bring his wife back so I could explain what was going on and to reschedule his appointment. He stated that was fine.   I brought wife back to office and explained what was going on. She stated that his BP has been getting low lately and they were told that he should go to the ER recently. She stated they were making changes to his lopressor. I stated my understanding.   We rescheduled his appointment to 4/24/25 @ 8AM. AVS was printed and given to patient. I encouraged them to call the cardiologist office prior to leaving town or his PCP. They stated their understanding. Patient and wife were walked back to waiting area prior to them leaving. Patent was stable prior to leaving.

## 2025-03-25 NOTE — PROGRESS NOTES
[facility]   Established Patient Office Visit  [address]  [address]  [place] [address]  [phone]        OFFICE VISIT:  3/25/2025    Yuan Mejias - : 1945    Reason For Visit:  Yuan is a 79 y.o. male who is here for Follow-up    1. PAF (paroxysmal atrial fibrillation) (HCC)    2. Essential hypertension    3. Coronary artery disease involving native coronary artery of native heart without angina pectoris    4. Valvular heart disease    5. Dyslipidemia        Patient with a history of paroxysmal atrial fib, chronic anticoagulation, hypertension, CAD, dyslipidemia and previous stent placement.  Patient has had valvular heart disease with aortic valve replacement.  Recent cardioversion in 2024 and has been maintaining sinus rhythm since.    He is a patient of Dr. Liang.    Patient was scheduled for pulmonary function test today and was unable to perform the test due to a low blood pressure.  Patient presents to clinic today with his wife.  There is no documentation in epic as to what the blood pressures were.  Patient does not remember.  Did call down to pulmonary function test they state initial blood pressure was 84/58.  Gave him some water then it came up to 94/66.  Patient tells me he saw his primary care provider recently who has been started to lower his Lopressor to half a tablet twice a day due to orthostatic hypotension.  Then stopped it completely.  Did try to stop the Bumex as well however he gained 9 pounds.  So he is back on the Bumex 0.5 twice daily.    In review of patient's medicines it appears he is taking his Bumex and diltiazem at the same time in the morning.  Question whether or not that is lowering his blood pressure too much.  Did recommend to still take the Bumex in the morning but to take the diltiazem at noon or later.  They will do that and follow-up blood pressure in a week with primary care provider.  Pulmonary function test rescheduled for next

## 2025-04-18 ENCOUNTER — APPOINTMENT (OUTPATIENT)
Dept: CT IMAGING | Age: 80
End: 2025-04-18
Payer: OTHER GOVERNMENT

## 2025-04-18 ENCOUNTER — HOSPITAL ENCOUNTER (EMERGENCY)
Age: 80
Discharge: ANOTHER HOSPITAL | End: 2025-04-19
Attending: STUDENT IN AN ORGANIZED HEALTH CARE EDUCATION/TRAINING PROGRAM
Payer: OTHER GOVERNMENT

## 2025-04-18 VITALS
RESPIRATION RATE: 20 BRPM | OXYGEN SATURATION: 93 % | DIASTOLIC BLOOD PRESSURE: 77 MMHG | HEIGHT: 70 IN | BODY MASS INDEX: 37.22 KG/M2 | WEIGHT: 260 LBS | TEMPERATURE: 98.6 F | SYSTOLIC BLOOD PRESSURE: 117 MMHG | HEART RATE: 76 BPM

## 2025-04-18 DIAGNOSIS — N20.1 URETEROLITHIASIS: ICD-10-CM

## 2025-04-18 DIAGNOSIS — N17.9 AKI (ACUTE KIDNEY INJURY): Primary | ICD-10-CM

## 2025-04-18 LAB
ALBUMIN SERPL-MCNC: 4 G/DL (ref 3.5–5.2)
ALP SERPL-CCNC: 110 U/L (ref 40–129)
ALT SERPL-CCNC: 23 U/L (ref 10–50)
ANION GAP SERPL CALCULATED.3IONS-SCNC: 11 MMOL/L (ref 8–16)
AST SERPL-CCNC: 28 U/L (ref 10–50)
BACTERIA URNS QL MICRO: NEGATIVE /HPF
BASOPHILS # BLD: 0 K/UL (ref 0–0.2)
BASOPHILS NFR BLD: 0.2 % (ref 0–1)
BILIRUB SERPL-MCNC: 0.6 MG/DL (ref 0.2–1.2)
BILIRUB UR QL STRIP: NEGATIVE
BUN SERPL-MCNC: 32 MG/DL (ref 8–23)
CALCIUM SERPL-MCNC: 8.8 MG/DL (ref 8.8–10.2)
CHLORIDE SERPL-SCNC: 102 MMOL/L (ref 98–107)
CLARITY UR: CLEAR
CO2 SERPL-SCNC: 27 MMOL/L (ref 22–29)
COLOR UR: YELLOW
CREAT SERPL-MCNC: 3 MG/DL (ref 0.7–1.2)
CRYSTALS URNS MICRO: NORMAL /HPF
EOSINOPHIL # BLD: 0 K/UL (ref 0–0.6)
EOSINOPHIL NFR BLD: 0.5 % (ref 0–5)
EPI CELLS #/AREA URNS AUTO: 0 /HPF (ref 0–5)
ERYTHROCYTE [DISTWIDTH] IN BLOOD BY AUTOMATED COUNT: 13.7 % (ref 11.5–14.5)
GLUCOSE SERPL-MCNC: 103 MG/DL (ref 70–99)
GLUCOSE UR STRIP.AUTO-MCNC: 500 MG/DL
HCT VFR BLD AUTO: 39.3 % (ref 42–52)
HGB BLD-MCNC: 12.9 G/DL (ref 14–18)
HGB UR STRIP.AUTO-MCNC: NEGATIVE MG/L
HYALINE CASTS #/AREA URNS AUTO: 0 /HPF (ref 0–8)
IMM GRANULOCYTES # BLD: 0 K/UL
KETONES UR STRIP.AUTO-MCNC: NEGATIVE MG/DL
LEUKOCYTE ESTERASE UR QL STRIP.AUTO: ABNORMAL
LIPASE SERPL-CCNC: 51 U/L (ref 13–60)
LYMPHOCYTES # BLD: 0.9 K/UL (ref 1.1–4.5)
LYMPHOCYTES NFR BLD: 10.5 % (ref 20–40)
MAGNESIUM SERPL-MCNC: 2.2 MG/DL (ref 1.6–2.4)
MCH RBC QN AUTO: 31.5 PG (ref 27–31)
MCHC RBC AUTO-ENTMCNC: 32.8 G/DL (ref 33–37)
MCV RBC AUTO: 95.9 FL (ref 80–94)
MONOCYTES # BLD: 0.8 K/UL (ref 0–0.9)
MONOCYTES NFR BLD: 9.2 % (ref 0–10)
NEUTROPHILS # BLD: 6.5 K/UL (ref 1.5–7.5)
NEUTS SEG NFR BLD: 79.1 % (ref 50–65)
NITRITE UR QL STRIP.AUTO: NEGATIVE
PH UR STRIP.AUTO: 6 [PH] (ref 5–8)
PLATELET # BLD AUTO: 153 K/UL (ref 130–400)
PMV BLD AUTO: 9.8 FL (ref 9.4–12.4)
POTASSIUM SERPL-SCNC: 3.8 MMOL/L (ref 3.5–5.1)
PROT SERPL-MCNC: 6.4 G/DL (ref 6.4–8.3)
PROT UR STRIP.AUTO-MCNC: NEGATIVE MG/DL
RBC # BLD AUTO: 4.1 M/UL (ref 4.7–6.1)
RBC #/AREA URNS AUTO: 2 /HPF (ref 0–4)
SODIUM SERPL-SCNC: 140 MMOL/L (ref 136–145)
SP GR UR STRIP.AUTO: 1.01 (ref 1–1.03)
UROBILINOGEN UR STRIP.AUTO-MCNC: 0.2 E.U./DL
WBC # BLD AUTO: 8.3 K/UL (ref 4.8–10.8)
WBC #/AREA URNS AUTO: 1 /HPF (ref 0–5)

## 2025-04-18 PROCEDURE — 36415 COLL VENOUS BLD VENIPUNCTURE: CPT

## 2025-04-18 PROCEDURE — 83690 ASSAY OF LIPASE: CPT

## 2025-04-18 PROCEDURE — 2580000003 HC RX 258: Performed by: STUDENT IN AN ORGANIZED HEALTH CARE EDUCATION/TRAINING PROGRAM

## 2025-04-18 PROCEDURE — 74176 CT ABD & PELVIS W/O CONTRAST: CPT

## 2025-04-18 PROCEDURE — 81001 URINALYSIS AUTO W/SCOPE: CPT

## 2025-04-18 PROCEDURE — 85025 COMPLETE CBC W/AUTO DIFF WBC: CPT

## 2025-04-18 PROCEDURE — 80053 COMPREHEN METABOLIC PANEL: CPT

## 2025-04-18 PROCEDURE — 83735 ASSAY OF MAGNESIUM: CPT

## 2025-04-18 PROCEDURE — 99285 EMERGENCY DEPT VISIT HI MDM: CPT

## 2025-04-18 PROCEDURE — 6370000000 HC RX 637 (ALT 250 FOR IP): Performed by: STUDENT IN AN ORGANIZED HEALTH CARE EDUCATION/TRAINING PROGRAM

## 2025-04-18 RX ORDER — OXYCODONE AND ACETAMINOPHEN 7.5; 325 MG/1; MG/1
1 TABLET ORAL ONCE
Refills: 0 | Status: COMPLETED | OUTPATIENT
Start: 2025-04-18 | End: 2025-04-18

## 2025-04-18 RX ORDER — 0.9 % SODIUM CHLORIDE 0.9 %
1000 INTRAVENOUS SOLUTION INTRAVENOUS ONCE
Status: COMPLETED | OUTPATIENT
Start: 2025-04-18 | End: 2025-04-18

## 2025-04-18 RX ADMIN — OXYCODONE HYDROCHLORIDE AND ACETAMINOPHEN 1 TABLET: 7.5; 325 TABLET ORAL at 19:25

## 2025-04-18 RX ADMIN — SODIUM CHLORIDE 1000 ML: 9 INJECTION, SOLUTION INTRAVENOUS at 17:00

## 2025-04-18 ASSESSMENT — PAIN DESCRIPTION - DESCRIPTORS: DESCRIPTORS: BURNING;CRAMPING

## 2025-04-18 ASSESSMENT — PAIN DESCRIPTION - LOCATION: LOCATION: ABDOMEN;GROIN

## 2025-04-18 ASSESSMENT — ENCOUNTER SYMPTOMS
NAUSEA: 0
SHORTNESS OF BREATH: 0
ABDOMINAL PAIN: 1
VOMITING: 0

## 2025-04-18 ASSESSMENT — PAIN - FUNCTIONAL ASSESSMENT: PAIN_FUNCTIONAL_ASSESSMENT: 0-10

## 2025-04-18 ASSESSMENT — PAIN DESCRIPTION - PAIN TYPE: TYPE: ACUTE PAIN

## 2025-04-18 ASSESSMENT — PAIN DESCRIPTION - ORIENTATION: ORIENTATION: RIGHT

## 2025-04-18 ASSESSMENT — PAIN SCALES - GENERAL: PAINLEVEL_OUTOF10: 3

## 2025-04-18 NOTE — ED PROVIDER NOTES
[facility] EMERGENCY DEPARTMENT  eMERGENCY dEPARTMENT eNCOUnter      Pt Name: Yuan Mejias  MRN: [id number]  Birthdate 1945  Date of evaluation: 4/18/2025  Provider: Raffy Garcia MD    Chief Complaint:  Chief Complaint   Patient presents with    Groin Pain     Right groin pain that radiates up to RLQ starting around 2200 last night. Denies injury or hx hernia. Reports pain was 10/10 last night but is a 4/10 now. Denies urinary complaints      HPI    Yuan Mejias is a 79 y.o. male who presents to the emergency department with pain; pointing to his right inguinal fold. Started last night. Not provoked by anything, no injuries. Pain did not radiate. Felt like a burning pain. No dysuria or hematuria. He also had abdominal tightness across his abdomen at the same time. Currently he has residual mid abdominal tightness. Normal urination.  Normal BM.     NursingNotes were reviewed.    Review of Systems   Constitutional:  Negative for chills and fever.   Respiratory:  Negative for shortness of breath.    Cardiovascular:  Negative for chest pain.   Gastrointestinal:  Positive for abdominal pain. Negative for nausea and vomiting.   Genitourinary:  Negative for difficulty urinating and dysuria.   Neurological:  Negative for syncope and weakness.       Past Medical History:   Diagnosis Date    Aortic stenosis     moderate    Arthritis     CAD (coronary artery disease) 05/2013    stent to Circ    Chest tightness, discomfort, or pressure 03/23/2012    CHF (congestive heart failure) (HCC)     Chronic anemia     RECEIVED PROCRIT    Chronic kidney disease     Stage 4    COVID-19 2023    Diabetes mellitus (HCC)     Family history of early CAD 03/23/2012    GERD (gastroesophageal reflux disease)     Hyperlipemia     Hypertension 03/23/2012    Neuromuscular disorder (HCC)     Obesity     Thyroid disease     HYPOTHYROIDISM    Unspecified sleep apnea        Past Surgical History:   Procedure Laterality Date    AORTIC  surgical history.  Cholecystitis or ascending cholangitis unlikely given ttp is not focally in RUQ, pain not worse with eating, pt afebrile.  Pancreatitis unlikely as the patient has a normal lipase. Diverticulitis less likely given the lack of change in bowel habits, focal LLQ abdominal pain.  No hernia palpated on exam.  Inflammatory Bowel Disease unlikely given pain is not episodic, no history of autoimmune disease, no recurrent diarrhea or bloody diarrhea on history.  Abdominal aortic aneurysm unlikely as VSS, no hypotension, no palpable abdominal mass.  Given normal testicular exam doubt testicular torsion, epididymitis, or orchitis.    Pyelonephritis or UTI unlikely given lack of fever, no dysuria, urgency, or other UTI symptoms. Urinalysis without signs of acute UTI.  Ureterolithiasis unlikely given pain is not colicky, pt has abdominal tenderness on exam, no h/o nephrolithiasis.    ED Course:    ED Course as of 04/18/25 1824   Fri Apr 18, 2025   1521 Pt with kidney stone and DEANA [AS]   1526 Most recent GFR is 40, in January. No more recent labs than that. He has never had GFR this low. Plan to admit for stone w DEANA. Since obstructing stone is present, he needs urology consultation which I do not have. Consent for transfer is obtained. [AS]   1719 Have requested initiation of transfer several times from [facility]; still working on this. Will plan to finish IV fluids and re-check Cr as well.  [AS]   1740 Spoke with Dr. Youngblood who will consult. [AS]      ED Course User Index  [AS] Raffy Garcia MD   Accepted by Dr. De Guzman. Accepted for xf to [facility]. Monitored during stay in ED; stable at end of my shift. Oncoming doc aware of plan.    Final Impression: See below.    Procedures    1. DEANA (acute kidney injury)    2. Ureterolithiasis      DISPOSITION Decision To Transfer 04/18/2025 03:26:31 PM   DISPOSITION CONDITION Stable         No follow-up provider specified.    DISCHARGE MEDICATIONS:  New

## 2025-04-18 NOTE — ED NOTES
[facility] [place]     Bed 354    Dr. Sheets, accepting     Face Sheet: [phone]    Report: [phone]    [facility]

## 2025-04-19 PROCEDURE — 2580000003 HC RX 258: Performed by: STUDENT IN AN ORGANIZED HEALTH CARE EDUCATION/TRAINING PROGRAM

## 2025-04-19 PROCEDURE — 6370000000 HC RX 637 (ALT 250 FOR IP): Performed by: STUDENT IN AN ORGANIZED HEALTH CARE EDUCATION/TRAINING PROGRAM

## 2025-04-19 RX ORDER — 0.9 % SODIUM CHLORIDE 0.9 %
1000 INTRAVENOUS SOLUTION INTRAVENOUS ONCE
Status: COMPLETED | OUTPATIENT
Start: 2025-04-19 | End: 2025-04-19

## 2025-04-19 RX ORDER — TAMSULOSIN HYDROCHLORIDE 0.4 MG/1
0.4 CAPSULE ORAL ONCE
Status: COMPLETED | OUTPATIENT
Start: 2025-04-19 | End: 2025-04-19

## 2025-04-19 RX ADMIN — SODIUM CHLORIDE 1000 ML: 0.9 INJECTION, SOLUTION INTRAVENOUS at 00:41

## 2025-04-19 RX ADMIN — TAMSULOSIN HYDROCHLORIDE 0.4 MG: 0.4 CAPSULE ORAL at 00:41

## 2025-04-21 DIAGNOSIS — I49.5 SA NODE DYSFUNCTION (HCC): ICD-10-CM

## 2025-04-21 DIAGNOSIS — Z95.0 PACEMAKER: Primary | ICD-10-CM

## 2025-04-22 ENCOUNTER — RESULTS FOLLOW-UP (OUTPATIENT)
Dept: CARDIOLOGY CLINIC | Age: 80
End: 2025-04-22

## 2025-04-30 ENCOUNTER — APPOINTMENT (OUTPATIENT)
Dept: GENERAL RADIOLOGY | Age: 80
DRG: 690 | End: 2025-04-30
Payer: OTHER GOVERNMENT

## 2025-04-30 ENCOUNTER — HOSPITAL ENCOUNTER (EMERGENCY)
Age: 80
Discharge: HOME | DRG: 690 | End: 2025-04-30
Attending: STUDENT IN AN ORGANIZED HEALTH CARE EDUCATION/TRAINING PROGRAM
Payer: OTHER GOVERNMENT

## 2025-04-30 ENCOUNTER — APPOINTMENT (OUTPATIENT)
Dept: NUCLEAR MEDICINE | Age: 80
DRG: 690 | End: 2025-04-30
Attending: STUDENT IN AN ORGANIZED HEALTH CARE EDUCATION/TRAINING PROGRAM
Payer: OTHER GOVERNMENT

## 2025-04-30 ENCOUNTER — APPOINTMENT (OUTPATIENT)
Dept: CT IMAGING | Age: 80
DRG: 690 | End: 2025-04-30
Payer: OTHER GOVERNMENT

## 2025-04-30 VITALS
DIASTOLIC BLOOD PRESSURE: 70 MMHG | SYSTOLIC BLOOD PRESSURE: 115 MMHG | OXYGEN SATURATION: 96 % | RESPIRATION RATE: 18 BRPM | WEIGHT: 255 LBS | BODY MASS INDEX: 36.59 KG/M2 | TEMPERATURE: 98.2 F | HEART RATE: 73 BPM

## 2025-04-30 DIAGNOSIS — R06.00 DYSPNEA, UNSPECIFIED TYPE: Primary | ICD-10-CM

## 2025-04-30 LAB
ALBUMIN SERPL-MCNC: 3.7 G/DL (ref 3.5–5.2)
ALP SERPL-CCNC: 111 U/L (ref 40–129)
ALT SERPL-CCNC: 24 U/L (ref 10–50)
ANION GAP SERPL CALCULATED.3IONS-SCNC: 10 MMOL/L (ref 8–16)
AST SERPL-CCNC: 21 U/L (ref 10–50)
B PARAP IS1001 DNA NPH QL NAA+NON-PROBE: NOT DETECTED
B PERT.PT PRMT NPH QL NAA+NON-PROBE: NOT DETECTED
BACTERIA URNS QL MICRO: NEGATIVE /HPF
BASOPHILS # BLD: 0 K/UL (ref 0–0.2)
BASOPHILS NFR BLD: 0.3 % (ref 0–1)
BILIRUB SERPL-MCNC: 0.5 MG/DL (ref 0.2–1.2)
BILIRUB UR QL STRIP: NEGATIVE
BUN SERPL-MCNC: 31 MG/DL (ref 8–23)
C PNEUM DNA NPH QL NAA+NON-PROBE: NOT DETECTED
CALCIUM SERPL-MCNC: 8.4 MG/DL (ref 8.8–10.2)
CHLORIDE SERPL-SCNC: 101 MMOL/L (ref 98–107)
CLARITY UR: ABNORMAL
CO2 SERPL-SCNC: 25 MMOL/L (ref 22–29)
COLOR UR: ABNORMAL
CREAT SERPL-MCNC: 2.4 MG/DL (ref 0.7–1.2)
CRYSTALS URNS MICRO: ABNORMAL /HPF
D DIMER PPP FEU-MCNC: 0.87 UG/ML FEU (ref 0–0.48)
EOSINOPHIL # BLD: 0.1 K/UL (ref 0–0.6)
EOSINOPHIL NFR BLD: 0.4 % (ref 0–5)
EPI CELLS #/AREA URNS AUTO: 0 /HPF (ref 0–5)
ERYTHROCYTE [DISTWIDTH] IN BLOOD BY AUTOMATED COUNT: 13.6 % (ref 11.5–14.5)
FLUAV RNA NPH QL NAA+NON-PROBE: NOT DETECTED
FLUBV RNA NPH QL NAA+NON-PROBE: NOT DETECTED
GLUCOSE SERPL-MCNC: 107 MG/DL (ref 70–99)
GLUCOSE UR STRIP.AUTO-MCNC: 500 MG/DL
HADV DNA NPH QL NAA+NON-PROBE: NOT DETECTED
HCOV 229E RNA NPH QL NAA+NON-PROBE: NOT DETECTED
HCOV HKU1 RNA NPH QL NAA+NON-PROBE: NOT DETECTED
HCOV NL63 RNA NPH QL NAA+NON-PROBE: NOT DETECTED
HCOV OC43 RNA NPH QL NAA+NON-PROBE: NOT DETECTED
HCT VFR BLD AUTO: 36.2 % (ref 42–52)
HGB BLD-MCNC: 11.8 G/DL (ref 14–18)
HGB UR STRIP.AUTO-MCNC: ABNORMAL MG/L
HMPV RNA NPH QL NAA+NON-PROBE: NOT DETECTED
HPIV1 RNA NPH QL NAA+NON-PROBE: NOT DETECTED
HPIV2 RNA NPH QL NAA+NON-PROBE: NOT DETECTED
HPIV3 RNA NPH QL NAA+NON-PROBE: NOT DETECTED
HPIV4 RNA NPH QL NAA+NON-PROBE: NOT DETECTED
HYALINE CASTS #/AREA URNS AUTO: 9 /HPF (ref 0–8)
IMM GRANULOCYTES # BLD: 0.1 K/UL
KETONES UR STRIP.AUTO-MCNC: NEGATIVE MG/DL
LEUKOCYTE ESTERASE UR QL STRIP.AUTO: ABNORMAL
LYMPHOCYTES # BLD: 0.8 K/UL (ref 1.1–4.5)
LYMPHOCYTES NFR BLD: 5.6 % (ref 20–40)
M PNEUMO DNA NPH QL NAA+NON-PROBE: NOT DETECTED
MAGNESIUM SERPL-MCNC: 1.9 MG/DL (ref 1.6–2.4)
MCH RBC QN AUTO: 31.6 PG (ref 27–31)
MCHC RBC AUTO-ENTMCNC: 32.6 G/DL (ref 33–37)
MCV RBC AUTO: 97.1 FL (ref 80–94)
MONOCYTES # BLD: 1.1 K/UL (ref 0–0.9)
MONOCYTES NFR BLD: 8.3 % (ref 0–10)
NEUTROPHILS # BLD: 11.6 K/UL (ref 1.5–7.5)
NEUTS SEG NFR BLD: 84.9 % (ref 50–65)
NITRITE UR QL STRIP.AUTO: NEGATIVE
PH UR STRIP.AUTO: 5.5 [PH] (ref 5–8)
PLATELET # BLD AUTO: 160 K/UL (ref 130–400)
PMV BLD AUTO: 9.6 FL (ref 9.4–12.4)
POTASSIUM SERPL-SCNC: 3.8 MMOL/L (ref 3.5–5.1)
PROT SERPL-MCNC: 6.1 G/DL (ref 6.4–8.3)
PROT UR STRIP.AUTO-MCNC: 100 MG/DL
RBC # BLD AUTO: 3.73 M/UL (ref 4.7–6.1)
RBC #/AREA URNS AUTO: >900 /HPF (ref 0–4)
RSV RNA NPH QL NAA+NON-PROBE: NOT DETECTED
RV+EV RNA NPH QL NAA+NON-PROBE: NOT DETECTED
SARS-COV-2 RNA NPH QL NAA+NON-PROBE: NOT DETECTED
SODIUM SERPL-SCNC: 136 MMOL/L (ref 136–145)
SP GR UR STRIP.AUTO: 1.01 (ref 1–1.03)
TSH SERPL DL<=0.005 MIU/L-ACNC: 1.54 UIU/ML (ref 0.27–4.2)
UROBILINOGEN UR STRIP.AUTO-MCNC: 0.2 E.U./DL
WBC # BLD AUTO: 13.7 K/UL (ref 4.8–10.8)
WBC #/AREA URNS AUTO: 65 /HPF (ref 0–5)

## 2025-04-30 PROCEDURE — 78580 LUNG PERFUSION IMAGING: CPT

## 2025-04-30 PROCEDURE — 87186 SC STD MICRODIL/AGAR DIL: CPT

## 2025-04-30 PROCEDURE — 83735 ASSAY OF MAGNESIUM: CPT

## 2025-04-30 PROCEDURE — 70450 CT HEAD/BRAIN W/O DYE: CPT

## 2025-04-30 PROCEDURE — 81001 URINALYSIS AUTO W/SCOPE: CPT

## 2025-04-30 PROCEDURE — 87086 URINE CULTURE/COLONY COUNT: CPT

## 2025-04-30 PROCEDURE — 84443 ASSAY THYROID STIM HORMONE: CPT

## 2025-04-30 PROCEDURE — 71045 X-RAY EXAM CHEST 1 VIEW: CPT

## 2025-04-30 PROCEDURE — 99285 EMERGENCY DEPT VISIT HI MDM: CPT

## 2025-04-30 PROCEDURE — 85025 COMPLETE CBC W/AUTO DIFF WBC: CPT

## 2025-04-30 PROCEDURE — 80053 COMPREHEN METABOLIC PANEL: CPT

## 2025-04-30 PROCEDURE — 36415 COLL VENOUS BLD VENIPUNCTURE: CPT

## 2025-04-30 PROCEDURE — 0202U NFCT DS 22 TRGT SARS-COV-2: CPT

## 2025-04-30 PROCEDURE — A9540 TC99M MAA: HCPCS | Performed by: STUDENT IN AN ORGANIZED HEALTH CARE EDUCATION/TRAINING PROGRAM

## 2025-04-30 PROCEDURE — 85379 FIBRIN DEGRADATION QUANT: CPT

## 2025-04-30 PROCEDURE — 3430000000 HC RX DIAGNOSTIC RADIOPHARMACEUTICAL: Performed by: STUDENT IN AN ORGANIZED HEALTH CARE EDUCATION/TRAINING PROGRAM

## 2025-04-30 PROCEDURE — 87077 CULTURE AEROBIC IDENTIFY: CPT

## 2025-04-30 RX ADMIN — Medication 5 MILLICURIE: at 16:01

## 2025-04-30 ASSESSMENT — ENCOUNTER SYMPTOMS
SHORTNESS OF BREATH: 1
DIARRHEA: 0
SORE THROAT: 0
EYE REDNESS: 0
CHEST TIGHTNESS: 0
ABDOMINAL PAIN: 0
EYE PAIN: 0
BLOOD IN STOOL: 0
NAUSEA: 0
VOMITING: 0
COUGH: 0

## 2025-04-30 ASSESSMENT — PAIN - FUNCTIONAL ASSESSMENT
PAIN_FUNCTIONAL_ASSESSMENT: NONE - DENIES PAIN
PAIN_FUNCTIONAL_ASSESSMENT: NONE - DENIES PAIN

## 2025-04-30 NOTE — ED PROVIDER NOTES
[facility] EMERGENCY DEPARTMENT  eMERGENCY dEPARTMENT eNCOUnter      Pt Name: Yuan Mejias  MRN: [id number]  Birthdate 1945  Date of evaluation: 4/30/2025  Provider: Esperanza Davison MD    CHIEF COMPLAINT       Chief Complaint   Patient presents with    Shortness of Breath     Recent cysto with stent placement; c/o SOB, fever, and confusion onset yesterday    Fever    Post-op Problem         HISTORY OF PRESENT ILLNESS   (Location/Symptom, Timing/Onset,Context/Setting, Quality, Duration, Modifying Factors, Severity)  Note limiting factors.     HPI    Yuan Mejias is a 79 y.o. male with PMH of paroxysmal A-fib on Eliquis, hypertension, CAD, aortic valve replacement, CHF, and recent right sided ureteral stone status post cystoscopy and stent placement at [facility] in [place] who presents to the emergency department with CC of fever, fatigue, shortness of breath.  Patient had J stent placement on 4/20/25 and had cystoscopy with retrograde pyelogram, uteroscopy, ureteral stent insertion and stone extraction on 4/26/2025.  He resumed Eliquis yesterday.  He has had hematuria today but no large clots.  Denies nausea, vomiting, diarrhea.  He is not having any flank pain or suprapubic pain.        NursingNotes were reviewed.    REVIEW OF SYSTEMS    (2-9 systems for level 4, 10 or more for level 5)     Review of Systems   Constitutional:  Positive for chills, fatigue and fever. Negative for appetite change.   HENT:  Negative for congestion and sore throat.    Eyes:  Negative for pain and redness.   Respiratory:  Positive for shortness of breath. Negative for cough and chest tightness.    Cardiovascular:  Negative for chest pain and leg swelling.   Gastrointestinal:  Negative for abdominal pain, blood in stool, diarrhea, nausea and vomiting.   Genitourinary:  Positive for hematuria. Negative for decreased urine volume, dysuria and frequency.   Musculoskeletal:  Negative for neck pain and neck stiffness.   Skin:   PERFUSION ONLY   Final Result   Normal perfusion lung scan.           ______________________________________    Electronically signed by: PETER LYNN M.D.   Date:     04/30/2025   Time:    16:40       CT HEAD WO CONTRAST   Final Result   Mild  atrophy and microvascular white matter changes without acute intracranial process.        All CT scans are performed using dose optimization techniques as appropriate to the performed exam and include    at least one of the following: Automated exposure control, adjustment of the mA and/or kV according to size, and the use of iterative reconstruction technique.        ______________________________________    Electronically signed by: LYN HERR M.D.   Date:     04/30/2025   Time:    14:55       XR CHEST PORTABLE   Final Result       1.  No acute findings in the chest.               ______________________________________    Electronically signed by: ONEIL GARCIA M.D.   Date:     04/30/2025   Time:    12:35               LABS:  Labs Reviewed   CBC WITH AUTO DIFFERENTIAL - Abnormal; Notable for the following components:       Result Value    WBC 13.7 (*)     RBC 3.73 (*)     Hemoglobin 11.8 (*)     Hematocrit 36.2 (*)     MCV 97.1 (*)     MCH 31.6 (*)     MCHC 32.6 (*)     Neutrophils % 84.9 (*)     Lymphocytes % 5.6 (*)     Neutrophils Absolute 11.6 (*)     Lymphocytes Absolute 0.8 (*)     Monocytes Absolute 1.10 (*)     All other components within normal limits   COMPREHENSIVE METABOLIC PANEL - Abnormal; Notable for the following components:    Glucose 107 (*)     BUN 31 (*)     Creatinine 2.4 (*)     Est, Glom Filt Rate 27 (*)     Calcium 8.4 (*)     Total Protein 6.1 (*)     All other components within normal limits   URINALYSIS WITH REFLEX TO CULTURE - Abnormal; Notable for the following components:    Color, UA RED (*)     Clarity, UA CLOUDY (*)     Glucose, Ur 500 (*)     Blood, Urine LARGE (*)     Protein,  (*)     Leukocyte Esterase, Urine MODERATE (*)     All

## 2025-05-02 ENCOUNTER — APPOINTMENT (OUTPATIENT)
Dept: GENERAL RADIOLOGY | Age: 80
DRG: 690 | End: 2025-05-02
Payer: OTHER GOVERNMENT

## 2025-05-02 ENCOUNTER — HOSPITAL ENCOUNTER (INPATIENT)
Age: 80
LOS: 3 days | Discharge: HOME | DRG: 690 | End: 2025-05-06
Attending: STUDENT IN AN ORGANIZED HEALTH CARE EDUCATION/TRAINING PROGRAM
Payer: OTHER GOVERNMENT

## 2025-05-02 ENCOUNTER — APPOINTMENT (OUTPATIENT)
Dept: CT IMAGING | Age: 80
DRG: 690 | End: 2025-05-02
Payer: OTHER GOVERNMENT

## 2025-05-02 DIAGNOSIS — R78.81 BACTEREMIA: ICD-10-CM

## 2025-05-02 DIAGNOSIS — R50.82 POSTOPERATIVE FEVER: Primary | ICD-10-CM

## 2025-05-02 DIAGNOSIS — R50.9 FEVER OF UNKNOWN ORIGIN: ICD-10-CM

## 2025-05-02 LAB
ALBUMIN SERPL-MCNC: 3.2 G/DL (ref 3.5–5.2)
ALP SERPL-CCNC: 92 U/L (ref 40–129)
ALT SERPL-CCNC: 27 U/L (ref 10–50)
ANION GAP SERPL CALCULATED.3IONS-SCNC: 12 MMOL/L (ref 8–16)
AST SERPL-CCNC: 25 U/L (ref 10–50)
BACTERIA URNS QL MICRO: NEGATIVE /HPF
BASOPHILS # BLD: 0 K/UL (ref 0–0.2)
BASOPHILS NFR BLD: 0.4 % (ref 0–1)
BILIRUB SERPL-MCNC: 0.6 MG/DL (ref 0.2–1.2)
BNP BLD-MCNC: 3237 PG/ML (ref 0–449)
BUN SERPL-MCNC: 28 MG/DL (ref 8–23)
CALCIUM SERPL-MCNC: 7.8 MG/DL (ref 8.8–10.2)
CHLORIDE SERPL-SCNC: 101 MMOL/L (ref 98–107)
CO2 SERPL-SCNC: 23 MMOL/L (ref 22–29)
CREAT SERPL-MCNC: 2.7 MG/DL (ref 0.7–1.2)
CRYSTALS URNS MICRO: ABNORMAL /HPF
EOSINOPHIL # BLD: 0 K/UL (ref 0–0.6)
EOSINOPHIL NFR BLD: 0.3 % (ref 0–5)
EPI CELLS #/AREA URNS AUTO: 2 /HPF (ref 0–5)
ERYTHROCYTE [DISTWIDTH] IN BLOOD BY AUTOMATED COUNT: 13.5 % (ref 11.5–14.5)
GLUCOSE SERPL-MCNC: 118 MG/DL (ref 70–99)
HCT VFR BLD AUTO: 32.6 % (ref 42–52)
HGB BLD-MCNC: 10.6 G/DL (ref 14–18)
HYALINE CASTS #/AREA URNS AUTO: 5 /HPF (ref 0–8)
IMM GRANULOCYTES # BLD: 0 K/UL
LYMPHOCYTES # BLD: 0.5 K/UL (ref 1.1–4.5)
LYMPHOCYTES NFR BLD: 6.1 % (ref 20–40)
MCH RBC QN AUTO: 31.1 PG (ref 27–31)
MCHC RBC AUTO-ENTMCNC: 32.5 G/DL (ref 33–37)
MCV RBC AUTO: 95.6 FL (ref 80–94)
MONOCYTES # BLD: 0.6 K/UL (ref 0–0.9)
MONOCYTES NFR BLD: 7.9 % (ref 0–10)
NEUTROPHILS # BLD: 6.7 K/UL (ref 1.5–7.5)
NEUTS SEG NFR BLD: 84.9 % (ref 50–65)
PLATELET # BLD AUTO: 148 K/UL (ref 130–400)
PMV BLD AUTO: 10.2 FL (ref 9.4–12.4)
POTASSIUM SERPL-SCNC: 3.4 MMOL/L (ref 3.5–5.1)
PROT SERPL-MCNC: 5.9 G/DL (ref 6.4–8.3)
RBC # BLD AUTO: 3.41 M/UL (ref 4.7–6.1)
RBC #/AREA URNS AUTO: 346 /HPF (ref 0–4)
SODIUM SERPL-SCNC: 136 MMOL/L (ref 136–145)
TROPONIN, HIGH SENSITIVITY: 45 NG/L (ref 0–22)
WBC # BLD AUTO: 7.8 K/UL (ref 4.8–10.8)
WBC #/AREA URNS AUTO: 20 /HPF (ref 0–5)

## 2025-05-02 PROCEDURE — 93005 ELECTROCARDIOGRAM TRACING: CPT | Performed by: STUDENT IN AN ORGANIZED HEALTH CARE EDUCATION/TRAINING PROGRAM

## 2025-05-02 PROCEDURE — 36415 COLL VENOUS BLD VENIPUNCTURE: CPT

## 2025-05-02 PROCEDURE — 71250 CT THORAX DX C-: CPT

## 2025-05-02 PROCEDURE — 85025 COMPLETE CBC W/AUTO DIFF WBC: CPT

## 2025-05-02 PROCEDURE — 83605 ASSAY OF LACTIC ACID: CPT

## 2025-05-02 PROCEDURE — 84484 ASSAY OF TROPONIN QUANT: CPT

## 2025-05-02 PROCEDURE — 80053 COMPREHEN METABOLIC PANEL: CPT

## 2025-05-02 PROCEDURE — 99285 EMERGENCY DEPT VISIT HI MDM: CPT

## 2025-05-02 PROCEDURE — 83880 ASSAY OF NATRIURETIC PEPTIDE: CPT

## 2025-05-02 PROCEDURE — 87040 BLOOD CULTURE FOR BACTERIA: CPT

## 2025-05-02 PROCEDURE — 84145 PROCALCITONIN (PCT): CPT

## 2025-05-02 PROCEDURE — 74176 CT ABD & PELVIS W/O CONTRAST: CPT

## 2025-05-02 PROCEDURE — 71045 X-RAY EXAM CHEST 1 VIEW: CPT

## 2025-05-02 RX ORDER — CEFDINIR 300 MG/1
300 CAPSULE ORAL 2 TIMES DAILY
Status: ON HOLD | COMMUNITY
End: 2025-05-06 | Stop reason: HOSPADM

## 2025-05-03 PROBLEM — B99.9 FEVER DUE TO INFECTION: Status: ACTIVE | Noted: 2025-05-03

## 2025-05-03 LAB
A BAUMANNII DNA BLD POS QL NAA+NON-PROBE: NOT DETECTED
ANION GAP SERPL CALCULATED.3IONS-SCNC: 13 MMOL/L (ref 8–16)
BASOPHILS # BLD: 0 K/UL (ref 0–0.2)
BASOPHILS NFR BLD: 0.3 % (ref 0–1)
BILIRUB UR QL STRIP: NEGATIVE
BUN SERPL-MCNC: 28 MG/DL (ref 8–23)
C ALBICANS DNA BLD POS QL NAA+NON-PROBE: NOT DETECTED
C AURIS DNA BLD POS QL NAA+PROBE: NOT DETECTED
C GLABRATA DNA BLD POS QL NAA+NON-PROBE: NOT DETECTED
C KRUSEI DNA BLD POS QL NAA+NON-PROBE: NOT DETECTED
C PARAP DNA BLD POS QL NAA+NON-PROBE: NOT DETECTED
C TROPICLS DNA BLD POS QL NAA+NON-PROBE: NOT DETECTED
CALCIUM SERPL-MCNC: 7.7 MG/DL (ref 8.8–10.2)
CHLORIDE SERPL-SCNC: 102 MMOL/L (ref 98–107)
CLARITY UR: ABNORMAL
CO2 SERPL-SCNC: 22 MMOL/L (ref 22–29)
COLOR UR: YELLOW
CREAT SERPL-MCNC: 2.6 MG/DL (ref 0.7–1.2)
CRYPTOCOCCUS NEOFORMANS/GATTII BY PCR: NOT DETECTED
E CLOAC COMP DNA BLD POS NAA+NON-PROBE: NOT DETECTED
E COLI DNA BLD POS QL NAA+NON-PROBE: NOT DETECTED
E FAECALIS DNA BLD POS QL NAA+PROBE: DETECTED
E FAECIUM DNA BLD POS QL NAA+PROBE: NOT DETECTED
EKG P AXIS: -86 DEGREES
EKG P-R INTERVAL: 98 MS
EKG Q-T INTERVAL: 408 MS
EKG QRS DURATION: 108 MS
EKG QTC CALCULATION (BAZETT): 430 MS
EKG T AXIS: -159 DEGREES
ENTEROBACT DNA BLD POS QL NAA+NON-PROBE: NOT DETECTED
ENTEROCOC DNA BLD POS QL NAA+NON-PROBE: NOT DETECTED
EOSINOPHIL # BLD: 0 K/UL (ref 0–0.6)
EOSINOPHIL NFR BLD: 0.3 % (ref 0–5)
ERYTHROCYTE [DISTWIDTH] IN BLOOD BY AUTOMATED COUNT: 13.6 % (ref 11.5–14.5)
GLUCOSE BLD-MCNC: 110 MG/DL (ref 70–99)
GLUCOSE BLD-MCNC: 115 MG/DL (ref 70–99)
GLUCOSE BLD-MCNC: 122 MG/DL (ref 70–99)
GLUCOSE BLD-MCNC: 161 MG/DL (ref 70–99)
GLUCOSE BLD-MCNC: 173 MG/DL (ref 70–99)
GLUCOSE SERPL-MCNC: 104 MG/DL (ref 70–99)
GLUCOSE UR STRIP.AUTO-MCNC: =>1000 MG/DL
GN BLD CULTURE PNL BLD POS NAA+PROBE: NOT DETECTED
GP B STREP DNA BLD POS QL NAA+NON-PROBE: NOT DETECTED
HCT VFR BLD AUTO: 34.7 % (ref 42–52)
HGB BLD-MCNC: 10.6 G/DL (ref 14–18)
HGB UR STRIP.AUTO-MCNC: ABNORMAL MG/L
IMM GRANULOCYTES # BLD: 0 K/UL
K OXYTOCA DNA BLD POS QL NAA+NON-PROBE: NOT DETECTED
K PNEUMON DNA SPEC QL NAA+PROBE: NOT DETECTED
K. AEROGENES DNA SPEC QL NAA+PROBE: NOT DETECTED
KETONES UR STRIP.AUTO-MCNC: NEGATIVE MG/DL
L MONOCYTOG DNA BLD POS QL NAA+NON-PROBE: NOT DETECTED
LACTATE BLDV-SCNC: 1.1 MMOL/L (ref 0.5–1.9)
LEUKOCYTE ESTERASE UR QL STRIP.AUTO: ABNORMAL
LYMPHOCYTES # BLD: 0.4 K/UL (ref 1.1–4.5)
LYMPHOCYTES NFR BLD: 5.9 % (ref 20–40)
MAGNESIUM SERPL-MCNC: 1.8 MG/DL (ref 1.6–2.4)
MCH RBC QN AUTO: 30.5 PG (ref 27–31)
MCHC RBC AUTO-ENTMCNC: 30.5 G/DL (ref 33–37)
MCV RBC AUTO: 99.7 FL (ref 80–94)
MONOCYTES # BLD: 0.5 K/UL (ref 0–0.9)
MONOCYTES NFR BLD: 6.8 % (ref 0–10)
N MEN DNA BLD POS QL NAA+NON-PROBE: NOT DETECTED
NEUTROPHILS # BLD: 6 K/UL (ref 1.5–7.5)
NEUTS SEG NFR BLD: 86.1 % (ref 50–65)
NITRITE UR QL STRIP.AUTO: NEGATIVE
P AERUGINOSA DNA BLD POS NAA+NON-PROBE: NOT DETECTED
PERFORMED ON: ABNORMAL
PH UR STRIP.AUTO: 5.5 [PH] (ref 5–8)
PLATELET # BLD AUTO: 127 K/UL (ref 130–400)
PMV BLD AUTO: 10.9 FL (ref 9.4–12.4)
POTASSIUM SERPL-SCNC: 3.4 MMOL/L (ref 3.5–5)
PROCALCITONIN: 0.59 NG/ML (ref 0–0.09)
PROT UR STRIP.AUTO-MCNC: 100 MG/DL
PROTEUS SP DNA BLD POS QL NAA+NON-PROBE: NOT DETECTED
RBC # BLD AUTO: 3.48 M/UL (ref 4.7–6.1)
S AUREUS DNA BLD POS QL NAA+NON-PROBE: NOT DETECTED
S AUREUS+CONS DNA BLD POS NAA+NON-PROBE: NOT DETECTED
S EPIDERMIDIS DNA BLD POS QL NAA+PROBE: NOT DETECTED
S LUGDUNENSIS DNA BLD POS QL NAA+PROBE: NOT DETECTED
S MALTOPH DNA BLD POS QL NAA+PROBE: NOT DETECTED
S MARCESCENS DNA BLD POS NAA+NON-PROBE: NOT DETECTED
S PNEUM DNA BLD POS QL NAA+NON-PROBE: NOT DETECTED
S PYO DNA BLD POS QL NAA+NON-PROBE: NOT DETECTED
SALMONELLA DNA BLD POS QL NAA+PROBE: NOT DETECTED
SODIUM SERPL-SCNC: 137 MMOL/L (ref 136–145)
SP GR UR STRIP.AUTO: 1.02 (ref 1–1.03)
STREPTOCOCCUS DNA BLD POS NAA+NON-PROBE: NOT DETECTED
TROPONIN, HIGH SENSITIVITY: 43 NG/L (ref 0–22)
UROBILINOGEN UR STRIP.AUTO-MCNC: 1 E.U./DL
VANA+VANB ISLT/SPM QL: NOT DETECTED
WBC # BLD AUTO: 6.9 K/UL (ref 4.8–10.8)

## 2025-05-03 PROCEDURE — 83735 ASSAY OF MAGNESIUM: CPT

## 2025-05-03 PROCEDURE — 87040 BLOOD CULTURE FOR BACTERIA: CPT

## 2025-05-03 PROCEDURE — 36415 COLL VENOUS BLD VENIPUNCTURE: CPT

## 2025-05-03 PROCEDURE — 96374 THER/PROPH/DIAG INJ IV PUSH: CPT

## 2025-05-03 PROCEDURE — 2500000003 HC RX 250 WO HCPCS: Performed by: STUDENT IN AN ORGANIZED HEALTH CARE EDUCATION/TRAINING PROGRAM

## 2025-05-03 PROCEDURE — 80048 BASIC METABOLIC PNL TOTAL CA: CPT

## 2025-05-03 PROCEDURE — 99255 IP/OBS CONSLTJ NEW/EST HI 80: CPT | Performed by: UROLOGY

## 2025-05-03 PROCEDURE — 87086 URINE CULTURE/COLONY COUNT: CPT

## 2025-05-03 PROCEDURE — 93010 ELECTROCARDIOGRAM REPORT: CPT | Performed by: INTERNAL MEDICINE

## 2025-05-03 PROCEDURE — 82962 GLUCOSE BLOOD TEST: CPT

## 2025-05-03 PROCEDURE — 87150 DNA/RNA AMPLIFIED PROBE: CPT

## 2025-05-03 PROCEDURE — 81001 URINALYSIS AUTO W/SCOPE: CPT

## 2025-05-03 PROCEDURE — 6360000002 HC RX W HCPCS: Performed by: STUDENT IN AN ORGANIZED HEALTH CARE EDUCATION/TRAINING PROGRAM

## 2025-05-03 PROCEDURE — 94760 N-INVAS EAR/PLS OXIMETRY 1: CPT

## 2025-05-03 PROCEDURE — 1200000000 HC SEMI PRIVATE

## 2025-05-03 PROCEDURE — 84484 ASSAY OF TROPONIN QUANT: CPT

## 2025-05-03 PROCEDURE — 2580000003 HC RX 258: Performed by: STUDENT IN AN ORGANIZED HEALTH CARE EDUCATION/TRAINING PROGRAM

## 2025-05-03 PROCEDURE — 87186 SC STD MICRODIL/AGAR DIL: CPT

## 2025-05-03 PROCEDURE — 6370000000 HC RX 637 (ALT 250 FOR IP): Performed by: STUDENT IN AN ORGANIZED HEALTH CARE EDUCATION/TRAINING PROGRAM

## 2025-05-03 PROCEDURE — 85025 COMPLETE CBC W/AUTO DIFF WBC: CPT

## 2025-05-03 RX ORDER — POLYETHYLENE GLYCOL 3350 17 G/17G
17 POWDER, FOR SOLUTION ORAL DAILY PRN
Status: DISCONTINUED | OUTPATIENT
Start: 2025-05-03 | End: 2025-05-06 | Stop reason: HOSPADM

## 2025-05-03 RX ORDER — ATORVASTATIN CALCIUM 40 MG/1
40 TABLET, FILM COATED ORAL DAILY
Status: DISCONTINUED | OUTPATIENT
Start: 2025-05-03 | End: 2025-05-06 | Stop reason: HOSPADM

## 2025-05-03 RX ORDER — PANTOPRAZOLE SODIUM 40 MG/1
40 TABLET, DELAYED RELEASE ORAL DAILY
Status: DISCONTINUED | OUTPATIENT
Start: 2025-05-03 | End: 2025-05-06 | Stop reason: HOSPADM

## 2025-05-03 RX ORDER — ONDANSETRON 4 MG/1
4 TABLET, ORALLY DISINTEGRATING ORAL EVERY 8 HOURS PRN
Status: DISCONTINUED | OUTPATIENT
Start: 2025-05-03 | End: 2025-05-06 | Stop reason: HOSPADM

## 2025-05-03 RX ORDER — ACETAMINOPHEN 325 MG/1
650 TABLET ORAL EVERY 6 HOURS PRN
Status: DISCONTINUED | OUTPATIENT
Start: 2025-05-03 | End: 2025-05-06 | Stop reason: HOSPADM

## 2025-05-03 RX ORDER — LEVOTHYROXINE SODIUM 25 UG/1
25 TABLET ORAL DAILY
Status: DISCONTINUED | OUTPATIENT
Start: 2025-05-03 | End: 2025-05-06 | Stop reason: HOSPADM

## 2025-05-03 RX ORDER — LACTULOSE 10 G/15ML
20 SOLUTION ORAL 2 TIMES DAILY
Status: DISPENSED | OUTPATIENT
Start: 2025-05-03 | End: 2025-05-04

## 2025-05-03 RX ORDER — MAGNESIUM SULFATE IN WATER 40 MG/ML
2000 INJECTION, SOLUTION INTRAVENOUS PRN
Status: DISCONTINUED | OUTPATIENT
Start: 2025-05-03 | End: 2025-05-06 | Stop reason: HOSPADM

## 2025-05-03 RX ORDER — TAMSULOSIN HYDROCHLORIDE 0.4 MG/1
0.4 CAPSULE ORAL DAILY
Status: DISCONTINUED | OUTPATIENT
Start: 2025-05-03 | End: 2025-05-06 | Stop reason: HOSPADM

## 2025-05-03 RX ORDER — ACETAMINOPHEN 650 MG/1
650 SUPPOSITORY RECTAL EVERY 6 HOURS PRN
Status: DISCONTINUED | OUTPATIENT
Start: 2025-05-03 | End: 2025-05-06 | Stop reason: HOSPADM

## 2025-05-03 RX ORDER — BISACODYL 10 MG
10 SUPPOSITORY, RECTAL RECTAL ONCE
Status: DISCONTINUED | OUTPATIENT
Start: 2025-05-03 | End: 2025-05-06 | Stop reason: HOSPADM

## 2025-05-03 RX ORDER — INSULIN LISPRO 100 [IU]/ML
0-4 INJECTION, SOLUTION INTRAVENOUS; SUBCUTANEOUS
Status: DISCONTINUED | OUTPATIENT
Start: 2025-05-03 | End: 2025-05-06 | Stop reason: HOSPADM

## 2025-05-03 RX ORDER — POTASSIUM CHLORIDE 7.45 MG/ML
10 INJECTION INTRAVENOUS PRN
Status: DISCONTINUED | OUTPATIENT
Start: 2025-05-03 | End: 2025-05-06 | Stop reason: HOSPADM

## 2025-05-03 RX ORDER — GABAPENTIN 300 MG/1
300 CAPSULE ORAL 2 TIMES DAILY
Status: DISCONTINUED | OUTPATIENT
Start: 2025-05-03 | End: 2025-05-06 | Stop reason: HOSPADM

## 2025-05-03 RX ORDER — SODIUM CHLORIDE 0.9 % (FLUSH) 0.9 %
5-40 SYRINGE (ML) INJECTION PRN
Status: DISCONTINUED | OUTPATIENT
Start: 2025-05-03 | End: 2025-05-06 | Stop reason: HOSPADM

## 2025-05-03 RX ORDER — SODIUM CHLORIDE 9 MG/ML
INJECTION, SOLUTION INTRAVENOUS CONTINUOUS
Status: ACTIVE | OUTPATIENT
Start: 2025-05-03 | End: 2025-05-04

## 2025-05-03 RX ORDER — SODIUM CHLORIDE 9 MG/ML
INJECTION, SOLUTION INTRAVENOUS PRN
Status: DISCONTINUED | OUTPATIENT
Start: 2025-05-03 | End: 2025-05-06 | Stop reason: HOSPADM

## 2025-05-03 RX ORDER — POTASSIUM CHLORIDE 1500 MG/1
20 TABLET, EXTENDED RELEASE ORAL ONCE
Status: COMPLETED | OUTPATIENT
Start: 2025-05-03 | End: 2025-05-03

## 2025-05-03 RX ORDER — ONDANSETRON 2 MG/ML
4 INJECTION INTRAMUSCULAR; INTRAVENOUS EVERY 6 HOURS PRN
Status: DISCONTINUED | OUTPATIENT
Start: 2025-05-03 | End: 2025-05-06 | Stop reason: HOSPADM

## 2025-05-03 RX ORDER — SODIUM CHLORIDE 0.9 % (FLUSH) 0.9 %
5-40 SYRINGE (ML) INJECTION EVERY 12 HOURS SCHEDULED
Status: DISCONTINUED | OUTPATIENT
Start: 2025-05-03 | End: 2025-05-06 | Stop reason: HOSPADM

## 2025-05-03 RX ORDER — HYDROMORPHONE HYDROCHLORIDE 1 MG/ML
0.5 INJECTION, SOLUTION INTRAMUSCULAR; INTRAVENOUS; SUBCUTANEOUS EVERY 4 HOURS PRN
Status: DISCONTINUED | OUTPATIENT
Start: 2025-05-03 | End: 2025-05-06 | Stop reason: HOSPADM

## 2025-05-03 RX ADMIN — SODIUM CHLORIDE: 0.9 INJECTION, SOLUTION INTRAVENOUS at 03:06

## 2025-05-03 RX ADMIN — ATORVASTATIN CALCIUM 40 MG: 40 TABLET, FILM COATED ORAL at 08:10

## 2025-05-03 RX ADMIN — GABAPENTIN 300 MG: 300 CAPSULE ORAL at 08:10

## 2025-05-03 RX ADMIN — SODIUM CHLORIDE: 0.9 INJECTION, SOLUTION INTRAVENOUS at 20:20

## 2025-05-03 RX ADMIN — SODIUM CHLORIDE, PRESERVATIVE FREE 10 ML: 5 INJECTION INTRAVENOUS at 20:23

## 2025-05-03 RX ADMIN — LEVOTHYROXINE SODIUM 25 MCG: 25 TABLET ORAL at 05:24

## 2025-05-03 RX ADMIN — TAMSULOSIN HYDROCHLORIDE 0.4 MG: 0.4 CAPSULE ORAL at 08:10

## 2025-05-03 RX ADMIN — ACETAMINOPHEN 650 MG: 325 TABLET ORAL at 20:07

## 2025-05-03 RX ADMIN — PIPERACILLIN AND TAZOBACTAM 3375 MG: 3; .375 INJECTION, POWDER, LYOPHILIZED, FOR SOLUTION INTRAVENOUS at 08:15

## 2025-05-03 RX ADMIN — PIPERACILLIN AND TAZOBACTAM 3375 MG: 3; .375 INJECTION, POWDER, LYOPHILIZED, FOR SOLUTION INTRAVENOUS at 01:22

## 2025-05-03 RX ADMIN — GABAPENTIN 300 MG: 300 CAPSULE ORAL at 20:11

## 2025-05-03 RX ADMIN — POTASSIUM CHLORIDE 20 MEQ: 1500 TABLET, EXTENDED RELEASE ORAL at 08:10

## 2025-05-03 RX ADMIN — PANTOPRAZOLE SODIUM 40 MG: 40 TABLET, DELAYED RELEASE ORAL at 08:10

## 2025-05-03 RX ADMIN — SODIUM CHLORIDE, PRESERVATIVE FREE 10 ML: 5 INJECTION INTRAVENOUS at 08:11

## 2025-05-03 RX ADMIN — PIPERACILLIN AND TAZOBACTAM 3375 MG: 3; .375 INJECTION, POWDER, LYOPHILIZED, FOR SOLUTION INTRAVENOUS at 17:17

## 2025-05-03 NOTE — CONSULTS
Urology Attending Consult Note      Reason for Consultation: Fever chills    History: Pt. 2 weeks ago had right ureteral stone and developed acute pyelonephritis.  He had emergency stent placement.  He then 6 days later underwent ureteroscopy and stone extraction.  He has the stent in and has not been on abx.   Now pt. Comes to ER due to fevers and chills T at home is 102 per pt and his wife. This is his first stone episode.    Family History, Social History, Review of Systems:  Reviewed and agreed to as per chart    Vitals:  BP (!) 116/54   Pulse 72   Temp 99.5 °F (37.5 °C) (Temporal)   Resp 18   Ht 1.778 m (5' 10\")   Wt 113.9 kg (251 lb 1.7 oz)   SpO2 98%   BMI 36.03 kg/m²   Temp  Av.5 °F (36.9 °C)  Min: 97.7 °F (36.5 °C)  Max: 99.5 °F (37.5 °C)    Intake/Output Summary (Last 24 hours) at 5/3/2025 0752  Last data filed at 5/3/2025 0522  Gross per 24 hour   Intake 0 ml   Output 550 ml   Net -550 ml         Physical:  Well developed, well nourished in no acute distress  Mood indicates no abnormalities. Pt doesn’t appear depressed  Orientated to time and place  Neck is supple, trachea is midline  Respiratory effort is normal  Cardiovascular show no extremity swelling  Abdomen no masses or hernias are palpated, there is no tenderness. Liver and Spleen appear normal.  Skin show no abnormal lesions  Lymph nodes are not palpated in the inguinal, neck, or axillary area.       Labs:  WBC:    Lab Results   Component Value Date/Time    WBC 6.9 2025 05:03 AM     Hemoglobin/Hematocrit:    Lab Results   Component Value Date/Time    HGB 10.6 2025 05:03 AM    HCT 34.7 2025 05:03 AM    HCT 39.6 2012 11:18 AM     BMP:    Lab Results   Component Value Date/Time     2025 05:03 AM     2012 11:18 AM    K 3.4 2025 05:03 AM    K 3.8 2012 11:18 AM     2025 05:03 AM     2012 11:18 AM    CO2 22 2025 05:03 AM    BUN 28 2025 05:03 AM     CREATININE 2.6 05/03/2025 05:03 AM    CREATININE 0.9 03/26/2012 11:18 AM    CALCIUM 7.7 05/03/2025 05:03 AM    GFRAA 55 04/13/2022 08:08 AM    LABGLOM 24 05/03/2025 05:03 AM     PT/INR:    Lab Results   Component Value Date/Time    PROTIME 13.3 12/17/2024 02:03 AM    INR 1.04 12/17/2024 02:03 AM     PTT:    Lab Results   Component Value Date/Time    APTT 33.4 08/03/2015 01:16 AM   [APTT    Urinalysis: Postive for rbc and wbc    Urine Culture: pending    Blood Culture: pending    Antibiotic Therapy: on abx therapy    Imaging: CT shows no hydro and stent in good position     Impression/Plan: PT. Had acute right pyelonephritis due to stone and had first stent placement and then a few days later had stone extraction. He still has ureteral stent in place.  PT. Has pyelonephritis and needs to be on antibiotics for 14 days. He is scheduled to have stent removed next week and I agree with that. No acute intervention required at this time.  We will monitor him.    Giovani Ortiz MD Consults

## 2025-05-03 NOTE — ED PROVIDER NOTES
[facility] EMERGENCY DEPARTMENT  eMERGENCY dEPARTMENT eNCOUnter      Pt Name: Yuan Mejias  MRN: [id number]  Birthdate 1945  Date of evaluation: 5/2/2025  Provider: Esperanza Davison MD    CHIEF COMPLAINT       Chief Complaint   Patient presents with    Shortness of Breath     Seen Wednesday for similar symptoms         HISTORY OF PRESENT ILLNESS   (Location/Symptom, Timing/Onset,Context/Setting, Quality, Duration, Modifying Factors, Severity)  Note limiting factors.     HPI    Yuan Mejias is a 79 y.o. male with PMH of paroxysmal A-fib on Eliquis, hypertension, CAD, aortic valve replacement, CHF, and recent right sided ureteral stone status post cystoscopy and ureteral stent placement at [facility] in [place] who presents to the emergency department with CC of fever and shortness of breath.  Patient had J stent placement on 4/20/2025 and had cystoscopy with retrograde pyelogram, uteroscopy, and ureteral stent insertion and stone extraction on 4/26/2025.  He presented to our ED on 4/3/2025 and was evaluated by me.  At that time he was complaining of shortness of breath and fever but was afebrile in the ED.  He had a mildly elevated white count the lab work was otherwise reassuring except for elevated D-dimer.  VQ scan was negative for PE, chest x-ray was unremarkable, vitals remained stable and patient was discharged.    Over the last few days, patient has continued to have fevers up to 102.5 at home.  He has continued to feel mildly short of breath as well.  He notes that when he gets fevers he gets rigors and feels weak in the legs.  He denies any chest pain.  He currently denies shortness of breath.  He denies any abdominal pain, nausea, vomiting, or difficulty urinating.  He still has the stent in place and has scheduled follow-up on 5/9/2025 for stent removal.  He was seen by his PCP who empirically started him on cefdinir but he has continued to have fevers.  He was previously having some hematuria  after resuming Eliquis but this is also resolved.      NursingNotes were reviewed.    REVIEW OF SYSTEMS    (2-9 systems for level 4, 10 or more for level 5)     Review of Systems   Constitutional:  Positive for chills, fatigue and fever. Negative for appetite change.   HENT:  Negative for congestion and sore throat.    Eyes:  Negative for pain and redness.   Respiratory:  Positive for shortness of breath. Negative for cough and chest tightness.    Cardiovascular:  Negative for chest pain and leg swelling.   Gastrointestinal:  Negative for abdominal pain, blood in stool, diarrhea, nausea and vomiting.   Genitourinary:  Negative for decreased urine volume, difficulty urinating, dysuria, frequency and hematuria.   Musculoskeletal:  Negative for neck pain and neck stiffness.   Skin:  Negative for rash and wound.   Neurological:  Negative for dizziness, seizures, light-headedness and headaches.   Psychiatric/Behavioral:  Negative for behavioral problems and confusion.             PAST MEDICALHISTORY     Past Medical History:   Diagnosis Date    Aortic stenosis     moderate    Arthritis     CAD (coronary artery disease) 05/2013    stent to Circ    Chest tightness, discomfort, or pressure 03/23/2012    CHF (congestive heart failure) ([facility])     Chronic anemia     RECEIVED PROCRIT    Chronic kidney disease     Stage 4    COVID-19 2023    Diabetes mellitus ([facility])     Family history of early CAD 03/23/2012    GERD (gastroesophageal reflux disease)     Hyperlipemia     Hypertension 03/23/2012    Neuromuscular disorder ([facility])     Obesity     Thyroid disease     HYPOTHYROIDISM    Unspecified sleep apnea          SURGICAL HISTORY       Past Surgical History:   Procedure Laterality Date    AORTIC VALVE REPLACEMENT  07/21/15    Tissue Valve, Dr. Braun    CARDIAC CATHETERIZATION  3/26/12    CARDIAC CATHETERIZATION  5/21/2013  JDT    with stent to Circ- EF 50%    CARDIAC CATHETERIZATION  11/4/13  JDT    EF 60%, no intervention    CARDIAC  nightly as needed    VITAMIN D (VITAMIN D-3) 25 MCG (1000 UT) CAPS    2000 Iu daily       ALLERGIES     Nsaids and Plasma protein fraction    FAMILY HISTORY       Family History   Problem Relation Age of Onset    Heart Disease Other     High Blood Pressure Other           SOCIAL HISTORY       Social History     Socioeconomic History    Marital status:      Spouse name: None    Number of children: None    Years of education: None    Highest education level: None   Tobacco Use    Smoking status: Former     Current packs/day: 0.00     Average packs/day: 1.5 packs/day for 23.0 years (34.5 ttl pk-yrs)     Types: Cigarettes     Start date:      Quit date:      Years since quittin.3    Smokeless tobacco: Never   Vaping Use    Vaping status: Never Used   Substance and Sexual Activity    Alcohol use: No    Drug use: No     Social Drivers of Health     Food Insecurity: No Food Insecurity (2025)    Received from [facility]    Hunger Vital Sign     Worried About Running Out of Food in the Last Year: Never true     Ran Out of Food in the Last Year: Never true   Transportation Needs: No Transportation Needs (2025)    Received from [facility]    PRAPARE - Transportation     Lack of Transportation (Medical): No     Lack of Transportation (Non-Medical): No    Received from [facility], [facility]    Family and Community Support   Intimate Partner Violence: Not At Risk (2025)    Received from [facility]    Humiliation, Afraid, Rape, and Kick questionnaire     Fear of Current or Ex-Partner: No     Emotionally Abused: No     Physically Abused: No     Sexually Abused: No   Housing Stability: Low Risk  (2025)    Received from [facility]    Housing Stability Vital Sign     Unable to Pay for Housing in the Last Year: No     Number of Times Moved in the Last Year: 1     Homeless in the Last Year: No       SCREENINGS     infection.    Procalcitonin is elevated.    CT chest without contrast is unremarkable.    CT abdomen pelvis without contrast shows stent in good position without inflammatory changes surrounding and no hydronephrosis.    Discussed with Dr. Ortiz with urology.  Given persistent fevers and lack of another infectious source, I suspect that patient could have bacteremia from recent urologic procedure.  I would like to admit him on broad-spectrum antibiotics so the cultures can grow out.  Urology agrees that this is reasonable and does not recommend that I remove the stent since it is not out of place or causing hydronephrosis and the urine is clean.    Discussed with hospitalist for admission.                  CONSULTS:  None    :  Unless otherwise noted below, none     Procedures    FINAL IMPRESSION      1. Postoperative fever    2. Fever of unknown origin          DISPOSITION/PLAN   DISPOSITION Admitted 05/03/2025 01:33:53 AM               PATIENT REFERRED TO:  No follow-up provider specified.    DISCHARGE MEDICATIONS:  New Prescriptions    No medications on file          (Please note that portions of this note were completed with a voice recognition program.  Efforts were made to edit thedictations but occasionally words are mis-transcribed.)    Esperanza Davison MD (electronically signed)Emergency Physician          Esperanza Davison MD  05/03/25 0226

## 2025-05-03 NOTE — ED NOTES
ED TO INPATIENT SBAR HANDOFF    Patient Name: Yuan Mejias   : 1945  79 y.o.   Family/Caregiver Present: yes  Code Status Order: Prior    C-SSRS: Risk of Suicide: No Risk  Sitter No  Restraints:         Situation  Chief Complaint:   Chief Complaint   Patient presents with    Shortness of Breath     Seen Wednesday for similar symptoms     Patient Diagnosis: post op fever    Brief Description of Patient's Condition: PT arrived from home with c/o fever and dyspnea on exertion during febrile episodes. Pt is s/p cystoscopy w/ stent placement on 25 here. He was started on Omnicef and has taken is as directed. He reports fever, generalized weakness/ fatigue, and hallucinations w/ episodes of dyspnea during fever. He had a full SOA workup on Wednesday in the ER. He is currently A&O x 4 has had no episodes of hallucinations since he arrived, no fever either. He stood at b/s and voided in urinal with wife's assistance without any dyspnea or issues. He is very hard of hearing.   Mental Status: oriented and alert  Arrived from: home    Imaging:   CT CHEST WO CONTRAST   Final Result   Impression:       No acute cardiopulmonary disease        All CT scans are performed using dose optimization techniques as appropriate to the performed exam and include    at least one of the following: Automated exposure control, adjustment of the mA and/or kV according to size, and the use of iterative reconstruction technique.        ______________________________________    Electronically signed by: YONY MORALES M.D.   Date:     2025   Time:    00:27       CT ABDOMEN PELVIS WO CONTRAST Additional Contrast? None   Final Result       1. Interval placement of a right-sided ureteral stent with no residual obstructive change.   2. Moderate fecal retention.   3. Again noted is a nodular density at the tail the pancreas and splenic hilum.  Indeterminate for pancreatic lesion versus volume averaging with accessory splenic  tissue.  MRI may be the of benefit to further assess if not already performed.   4. Hiatal hernia.           All CT scans are performed using dose optimization techniques as appropriate to the performed exam and includes at least one of the following:  Automated exposure control, adjustment of the mA and/or kV according to size, and the use of iterative    reconstruction technique.        All CT scans are performed using dose optimization techniques as appropriate to the performed exam and include    at least one of the following: Automated exposure control, adjustment of the mA and/or kV according to size, and the use of iterative reconstruction technique.        ______________________________________    Electronically signed by: BECKY VALLES M.D.   Date:     05/03/2025   Time:    00:30       XR CHEST PORTABLE   Final Result       No acute cardiopulmonary disease and no change from 04/30/2025.               ______________________________________    Electronically signed by: YONY MORALES M.D.   Date:     05/03/2025   Time:    00:13         COVID-19 Results:   Internal Administration   First Dose COVID-19, MODERNA BLUE border, Primary or Immunocompromised, (age 12y+), IM, 100 mcg/0.5mL  03/20/2021   Second Dose COVID-19, MODERNA BLUE border, Primary or Immunocompromised, (age 12y+), IM, 100 mcg/0.5mL   04/21/2021       Last COVID Lab SARS-CoV-2, PCR (no units)   Date Value   04/30/2025 Not Detected     SARS-CoV-2, NAAT (no units)   Date Value   03/12/2022 Not Detected           Abnormal labs:   Abnormal Labs Reviewed   CBC WITH AUTO DIFFERENTIAL - Abnormal; Notable for the following components:       Result Value    RBC 3.41 (*)     Hemoglobin 10.6 (*)     Hematocrit 32.6 (*)     MCV 95.6 (*)     MCH 31.1 (*)     MCHC 32.5 (*)     Neutrophils % 84.9 (*)     Lymphocytes % 6.1 (*)     Lymphocytes Absolute 0.5 (*)     All other components within normal limits   COMPREHENSIVE METABOLIC PANEL - Abnormal; Notable for  Chronic kidney disease     Stage 4    COVID-19 2023    Diabetes mellitus (HCC)     Family history of early CAD 03/23/2012    GERD (gastroesophageal reflux disease)     Hyperlipemia     Hypertension 03/23/2012    Neuromuscular disorder (HCC)     Obesity     Thyroid disease     HYPOTHYROIDISM    Unspecified sleep apnea        Assessment  Vitals: Level of Consciousness: Alert (0)   Vitals:    05/02/25 2330 05/03/25 0035 05/03/25 0102 05/03/25 0103   BP: (!) 107/58 106/78 132/78    Pulse: 73 74 72    Resp: 20 23 21    Temp:       SpO2: 95% 95%  95%   Weight:       Height:         Predictive Model Details   No score data available for Deterioration Index      NPO? No  O2 Flow Rate: O2 Device: None (Room air)    Cardiac Rhythm: NSR  NIH Score: NIH     Active LDA's: 20 RT medial forearm, currently getting IV ABX  Peripheral IV 05/02/25 Proximal;Right;Medial Forearm (Active)   Site Assessment Clean, dry & intact 05/02/25 2244   Line Status Blood return noted 05/02/25 2244   Phlebitis Assessment No symptoms 05/02/25 2244   Infiltration Assessment 0 05/02/25 2244   Dressing Status New dressing applied 05/02/25 2244     Pertinent or High Risk Medications/Drips: no   If Yes, please provide details: n/a  Blood Product Administration: no  If Yes, please provide details: n/a  Sepsis Risk Score      Admitted with Sepsis? No  Additional Interventions/Comments:     Recommendation  Incomplete orders:   Patient Belongings: will send with him  Additional Comments:   If any further questions, please call Sending RN at [phone]    Electronically signed by: Electronically signed by Rosio Woods RN on 5/3/2025 at 1:31 AM

## 2025-05-03 NOTE — CARE COORDINATION
Care assumed for a 78 yo male who presented to [facility] and was admitted on 5/3 AM for suspected pyelonephritis as his wife reported a fever, chills and rigors with temp up to 102. He was placed on antibiotics and IVF's. Bowel regimen added. Continue current POC and follow

## 2025-05-03 NOTE — H&P
Hospitalist: History and Physical    Date: 5/3/2025 Time: 1:39 AM    Name: Yuan Mejias : 1945 MRN: [id number]    Code Status: Full Code No additional code details    PCP: Desmond Mayer MD    Patient's Chief Complaint Is: Fever  HPI: Patient is a 79 y.o. male with past medical history as documented below . Patient presented to [facility] ED due to due to fever and rigors.  He said that fever started 2 days after a procedure in [facility] in [place]. He  had Stent placement and lithotripsy performed in [place] because there was no urology coverage in this facility [facility].  Patient denies shortness of breath, chest pain, cough, bowel or bladder incontinence, abdominal pain, diarrhea or dysuria.   Blood work significant for elevated procal, hyponatremia and hypokalemia. WBC WNL.  Troponin mildly elevated. ProBNP elevated.  Urinalysis did not show any evidence of UTI but hematuria  ED provider called urology. Urology said no surgical intervention at this time. Manage conservatively.      CT Abdomen/Pelvis:  1. Interval placement of a right-sided ureteral stent with no residual obstructive change.   2. Moderate fecal retention.   3. Again noted is a nodular density at the tail the pancreas and splenic hilum.  Indeterminate for pancreatic lesion versus volume averaging with accessory splenic tissue.  MRI may be the of benefit to further assess if not already performed.   4. Hiatal hernia.     CXR did not report any acute cardiopulmonary process      Past Medical History:   Diagnosis Date    Aortic stenosis     moderate    Arthritis     CAD (coronary artery disease) 2013    stent to Circ    Chest tightness, discomfort, or pressure 2012    CHF (congestive heart failure) (HCC)     Chronic anemia     RECEIVED PROCRIT    Chronic kidney disease     Stage 4    COVID-19     Diabetes mellitus (HCC)     Family history of early CAD 2012    GERD (gastroesophageal reflux  tablet by mouth two times daily for 5 days. Then take one tablet by mouth once daily. 12/18/24   Octavia Skelton MD   metoprolol tartrate (LOPRESSOR) 25 MG tablet Take 1 tablet by mouth 2 times daily  Patient not taking: Reported on 4/18/2025 12/18/24   Octavia Skelton MD   dilTIAZem (CARDIZEM CD) 120 MG extended release capsule Take 1 capsule by mouth daily  Patient not taking: Reported on 4/18/2025 12/19/24   Octavia Skelton MD   apixaban (ELIQUIS) 5 MG TABS tablet Take 1 tablet by mouth 2 times daily 12/18/24   Kathleen Bolivar APRN   Semaglutide,0.25 or 0.5MG/DOS, 2 MG/1.5ML SOPN Inject into the skin once a week    Anum Pelaez MD   empagliflozin (JARDIANCE) 25 MG tablet Take 1 tablet by mouth daily    Anum Pelaez MD   tamsulosin (FLOMAX) 0.4 MG capsule Take 1 capsule by mouth nightly as needed    Anum Pelaez MD   levothyroxine (SYNTHROID) 25 MCG tablet Take 1 tablet by mouth Daily 3/15/22   Taylor Sinclair APRN - CNP   allopurinol (ZYLOPRIM) 300 MG tablet Take 1 tablet by mouth daily    Anum Pelaez MD   Coenzyme Q10 (COQ10) 200 MG CAPS Take 200 mg by mouth in the morning and at bedtime     Anum Pelaez MD   atorvastatin (LIPITOR) 40 MG tablet Take 1 tablet by mouth daily. 6/20/14   Kathleen Bolivar APRN   omeprazole (PRILOSEC) 20 MG capsule Take 1 capsule by mouth daily    ProviderAnum MD   gabapentin (NEURONTIN) 300 MG capsule Take 1 capsule by mouth in the morning and at bedtime.    ProviderAnum MD I have reviewed all pertinent history. Prior medical records and laboratory evaluation reviewed. Imaging independently reviewed.    Review of Systems:   As per HPI, otherwise all other ROS performed and found to be negative at this time.    Physical Exam:  Vitals:    05/03/25 0131   BP: 132/74   Pulse: 72   Resp: 20   Temp:    SpO2: 97%     CONSTITUTIONAL: Appears well developed and nourished, well groomed, in no  intracranial process.  All CT scans are performed using dose optimization techniques as appropriate to the performed exam and include at least one of the following: Automated exposure control, adjustment of the mA and/or kV according to size, and the use of iterative reconstruction technique.  ______________________________________ Electronically signed by: LYN HERR M.D. Date:     04/30/2025 Time:    14:55     XR CHEST PORTABLE  Result Date: 4/30/2025  EXAM: CHEST RADIOGRAPH  TECHNIQUE: Single frontal chest radiograph.  HISTORY: Shortness of breath.  COMPARISON: 01/16/2025  FINDINGS:  The patient is mildly leaning to the left. Sternotomy and left subclavian dual lead cardiac device, again noted. EKG leads project of mild atelectasis at the left base. No pulmonary infiltrate is identified. No pleural effusion or pneumothorax is seen. Stable borderline cardiomegaly. No acute displaced rib fractures are identified.        1.  No acute findings in the chest.    ______________________________________ Electronically signed by: ONEIL GARCIA M.D. Date:     04/30/2025 Time:    12:35     CT ABDOMEN PELVIS WO CONTRAST Additional Contrast? None  Result Date: 4/18/2025  EXAM: CT ABDOMEN AND PELVIS WITHOUT CONTRAST  HISTORY:   Abdominal and pelvic pain.  TECHNIQUE: CT acquisition of the abdomen and pelvis from the lower thorax through the pelvis without IV contrast administration.  2-D coronal and sagittal reformatted images were obtained from the axial source images. Oral Contrast: None. CT Dose Reduction Techniques Performed: Yes.  COMPARISON: None.  FINDINGS: Lower Thorax: Minimal bilateral pleural effusion and atelectasis. Right hilar calcified lymph node. Mild cardiomegaly. Aortic valve replacement changes. Pacing wires. Liver: No mass. Normal morphology. Biliary: The gallbladder and bile ducts are normal. Pancreas: Tail of the pancreas does show indeterminate 1.7 cm lesion. No other discrete focal abnormality or ductal

## 2025-05-03 NOTE — ED NOTES
Pt reports being recently started on ABX for UTI, SPO2 WDL on room air, pt doesn't appear dyspneic at this time, reports when his temp goes up that he starts feeling SOA. Wife reports last giving 1000 mg Tylenol around 1800

## 2025-05-03 NOTE — PROGRESS NOTES
4 Eyes Skin Assessment     NAME:  Yuan Mejias  YOB: 1945  MEDICAL RECORD NUMBER:  [id number]    The patient is being assessed for  Admission    I agree that at least one RN has performed a thorough Head to Toe Skin Assessment on the patient. ALL assessment sites listed below have been assessed.      Areas assessed by both nurses:    Head, Face, Ears, Shoulders, Back, Chest, Arms, Elbows, Hands, Sacrum. Buttock, Coccyx, Ischium, Legs. Feet and Heels, and Under Medical Devices         Does the Patient have a Wound? No noted wound(s)       Juan Prevention initiated by RN: Yes  Wound Care Orders initiated by RN: No    Pressure Injury (Stage 3,4, Unstageable, DTI, NWPT, and Complex wounds) if present, place Wound referral order by RN under : No    New Ostomies, if present place, Ostomy referral order under : No     Nurse 1 eSignature: Electronically signed by Timoteo Martinez RN on 5/3/25 at 3:39 AM CDT    **SHARE this note so that the co-signing nurse can place an eSignature**    Nurse 2 eSignature: Electronically signed by Lenora Laurent RN on 5/3/25 at 5:00 AM CDT

## 2025-05-04 ENCOUNTER — APPOINTMENT (OUTPATIENT)
Age: 80
DRG: 690 | End: 2025-05-04
Payer: OTHER GOVERNMENT

## 2025-05-04 PROBLEM — R78.81 BACTEREMIA DUE TO ENTEROCOCCUS: Status: ACTIVE | Noted: 2025-05-04

## 2025-05-04 PROBLEM — B95.2 BACTEREMIA DUE TO ENTEROCOCCUS: Status: ACTIVE | Noted: 2025-05-04

## 2025-05-04 LAB
ANION GAP SERPL CALCULATED.3IONS-SCNC: 11 MMOL/L (ref 8–16)
BACTERIA BLD CULT: ABNORMAL
BACTERIA BLD CULT: ABNORMAL
BACTERIA UR CULT: ABNORMAL
BASOPHILS # BLD: 0 K/UL (ref 0–0.2)
BASOPHILS NFR BLD: 0.7 % (ref 0–1)
BUN SERPL-MCNC: 25 MG/DL (ref 8–23)
CALCIUM SERPL-MCNC: 8.3 MG/DL (ref 8.8–10.2)
CHLORIDE SERPL-SCNC: 107 MMOL/L (ref 98–107)
CO2 SERPL-SCNC: 24 MMOL/L (ref 22–29)
CREAT SERPL-MCNC: 2.2 MG/DL (ref 0.7–1.2)
EOSINOPHIL # BLD: 0.1 K/UL (ref 0–0.6)
EOSINOPHIL NFR BLD: 1.7 % (ref 0–5)
ERYTHROCYTE [DISTWIDTH] IN BLOOD BY AUTOMATED COUNT: 13.4 % (ref 11.5–14.5)
GLUCOSE BLD-MCNC: 112 MG/DL (ref 70–99)
GLUCOSE BLD-MCNC: 150 MG/DL (ref 70–99)
GLUCOSE BLD-MCNC: 156 MG/DL (ref 70–99)
GLUCOSE BLD-MCNC: 95 MG/DL (ref 70–99)
GLUCOSE SERPL-MCNC: 109 MG/DL (ref 70–99)
HCT VFR BLD AUTO: 32.7 % (ref 42–52)
HGB BLD-MCNC: 10.3 G/DL (ref 14–18)
IMM GRANULOCYTES # BLD: 0 K/UL
LYMPHOCYTES # BLD: 0.5 K/UL (ref 1.1–4.5)
LYMPHOCYTES NFR BLD: 11.5 % (ref 20–40)
MAGNESIUM SERPL-MCNC: 2 MG/DL (ref 1.6–2.4)
MCH RBC QN AUTO: 30.9 PG (ref 27–31)
MCHC RBC AUTO-ENTMCNC: 31.5 G/DL (ref 33–37)
MCV RBC AUTO: 98.2 FL (ref 80–94)
MONOCYTES # BLD: 0.6 K/UL (ref 0–0.9)
MONOCYTES NFR BLD: 14.2 % (ref 0–10)
NEUTROPHILS # BLD: 2.9 K/UL (ref 1.5–7.5)
NEUTS SEG NFR BLD: 71.4 % (ref 50–65)
ORGANISM: ABNORMAL
PERFORMED ON: ABNORMAL
PERFORMED ON: NORMAL
PLATELET # BLD AUTO: 145 K/UL (ref 130–400)
PMV BLD AUTO: 10.1 FL (ref 9.4–12.4)
POTASSIUM SERPL-SCNC: 3.4 MMOL/L (ref 3.5–5)
RBC # BLD AUTO: 3.33 M/UL (ref 4.7–6.1)
SODIUM SERPL-SCNC: 142 MMOL/L (ref 136–145)
WBC # BLD AUTO: 4.1 K/UL (ref 4.8–10.8)

## 2025-05-04 PROCEDURE — 94760 N-INVAS EAR/PLS OXIMETRY 1: CPT

## 2025-05-04 PROCEDURE — 2580000003 HC RX 258: Performed by: NURSE PRACTITIONER

## 2025-05-04 PROCEDURE — 6360000002 HC RX W HCPCS: Performed by: STUDENT IN AN ORGANIZED HEALTH CARE EDUCATION/TRAINING PROGRAM

## 2025-05-04 PROCEDURE — 83735 ASSAY OF MAGNESIUM: CPT

## 2025-05-04 PROCEDURE — 2500000003 HC RX 250 WO HCPCS: Performed by: STUDENT IN AN ORGANIZED HEALTH CARE EDUCATION/TRAINING PROGRAM

## 2025-05-04 PROCEDURE — 36415 COLL VENOUS BLD VENIPUNCTURE: CPT

## 2025-05-04 PROCEDURE — 2580000003 HC RX 258: Performed by: STUDENT IN AN ORGANIZED HEALTH CARE EDUCATION/TRAINING PROGRAM

## 2025-05-04 PROCEDURE — 87040 BLOOD CULTURE FOR BACTERIA: CPT

## 2025-05-04 PROCEDURE — 82962 GLUCOSE BLOOD TEST: CPT

## 2025-05-04 PROCEDURE — C8929 TTE W OR WO FOL WCON,DOPPLER: HCPCS

## 2025-05-04 PROCEDURE — 6370000000 HC RX 637 (ALT 250 FOR IP): Performed by: NURSE PRACTITIONER

## 2025-05-04 PROCEDURE — 85025 COMPLETE CBC W/AUTO DIFF WBC: CPT

## 2025-05-04 PROCEDURE — 6360000004 HC RX CONTRAST MEDICATION: Performed by: NURSE PRACTITIONER

## 2025-05-04 PROCEDURE — 6370000000 HC RX 637 (ALT 250 FOR IP): Performed by: STUDENT IN AN ORGANIZED HEALTH CARE EDUCATION/TRAINING PROGRAM

## 2025-05-04 PROCEDURE — 80048 BASIC METABOLIC PNL TOTAL CA: CPT

## 2025-05-04 PROCEDURE — 6360000002 HC RX W HCPCS: Performed by: NURSE PRACTITIONER

## 2025-05-04 PROCEDURE — 1200000000 HC SEMI PRIVATE

## 2025-05-04 RX ORDER — DOCUSATE SODIUM 100 MG/1
100 CAPSULE, LIQUID FILLED ORAL 2 TIMES DAILY
Status: DISCONTINUED | OUTPATIENT
Start: 2025-05-04 | End: 2025-05-06 | Stop reason: HOSPADM

## 2025-05-04 RX ORDER — AMIODARONE HYDROCHLORIDE 200 MG/1
200 TABLET ORAL DAILY
Status: DISCONTINUED | OUTPATIENT
Start: 2025-05-04 | End: 2025-05-06 | Stop reason: HOSPADM

## 2025-05-04 RX ORDER — ALLOPURINOL 300 MG/1
300 TABLET ORAL DAILY
Status: DISCONTINUED | OUTPATIENT
Start: 2025-05-04 | End: 2025-05-06 | Stop reason: HOSPADM

## 2025-05-04 RX ORDER — ASPIRIN 81 MG/1
81 TABLET ORAL DAILY
Status: DISCONTINUED | OUTPATIENT
Start: 2025-05-04 | End: 2025-05-06 | Stop reason: HOSPADM

## 2025-05-04 RX ADMIN — ATORVASTATIN CALCIUM 40 MG: 40 TABLET, FILM COATED ORAL at 09:22

## 2025-05-04 RX ADMIN — AMPICILLIN SODIUM 2000 MG: 2 INJECTION, POWDER, FOR SOLUTION INTRAMUSCULAR; INTRAVENOUS at 15:39

## 2025-05-04 RX ADMIN — SODIUM CHLORIDE, PRESERVATIVE FREE 10 ML: 5 INJECTION INTRAVENOUS at 20:16

## 2025-05-04 RX ADMIN — AMIODARONE HYDROCHLORIDE 200 MG: 200 TABLET ORAL at 15:39

## 2025-05-04 RX ADMIN — APIXABAN 5 MG: 5 TABLET, FILM COATED ORAL at 20:15

## 2025-05-04 RX ADMIN — TAMSULOSIN HYDROCHLORIDE 0.4 MG: 0.4 CAPSULE ORAL at 09:22

## 2025-05-04 RX ADMIN — DOCUSATE SODIUM 100 MG: 100 CAPSULE, LIQUID FILLED ORAL at 20:14

## 2025-05-04 RX ADMIN — APIXABAN 5 MG: 5 TABLET, FILM COATED ORAL at 15:39

## 2025-05-04 RX ADMIN — SODIUM CHLORIDE 3000 MG: 9 INJECTION, SOLUTION INTRAVENOUS at 10:12

## 2025-05-04 RX ADMIN — LEVOTHYROXINE SODIUM 25 MCG: 25 TABLET ORAL at 07:22

## 2025-05-04 RX ADMIN — SODIUM CHLORIDE, PRESERVATIVE FREE 10 ML: 5 INJECTION INTRAVENOUS at 09:26

## 2025-05-04 RX ADMIN — ALLOPURINOL 300 MG: 300 TABLET ORAL at 15:39

## 2025-05-04 RX ADMIN — GABAPENTIN 300 MG: 300 CAPSULE ORAL at 20:14

## 2025-05-04 RX ADMIN — AMPICILLIN SODIUM 2000 MG: 2 INJECTION, POWDER, FOR SOLUTION INTRAMUSCULAR; INTRAVENOUS at 20:09

## 2025-05-04 RX ADMIN — PANTOPRAZOLE SODIUM 40 MG: 40 TABLET, DELAYED RELEASE ORAL at 09:22

## 2025-05-04 RX ADMIN — ASPIRIN 81 MG: 81 TABLET, COATED ORAL at 15:39

## 2025-05-04 RX ADMIN — GABAPENTIN 300 MG: 300 CAPSULE ORAL at 09:22

## 2025-05-04 RX ADMIN — PIPERACILLIN AND TAZOBACTAM 3375 MG: 3; .375 INJECTION, POWDER, LYOPHILIZED, FOR SOLUTION INTRAVENOUS at 00:14

## 2025-05-04 RX ADMIN — SULFUR HEXAFLUORIDE 2 ML: 60.7; .19; .19 INJECTION, POWDER, LYOPHILIZED, FOR SUSPENSION INTRAVENOUS; INTRAVESICAL at 11:08

## 2025-05-04 NOTE — PROGRESS NOTES
Yuan Mejias is a 79 y.o. male patient.   1. Postoperative fever    2. Fever of unknown origin    3. Bacteremia      Past Medical History:   Diagnosis Date    Aortic stenosis     moderate    Arthritis     CAD (coronary artery disease) 05/2013    stent to Circ    Chest tightness, discomfort, or pressure 03/23/2012    CHF (congestive heart failure) ([facility])     Chronic anemia     RECEIVED PROCRIT    Chronic kidney disease     Stage 4    COVID-19 2023    Diabetes mellitus ([facility])     Family history of early CAD 03/23/2012    GERD (gastroesophageal reflux disease)     Hyperlipemia     Hypertension 03/23/2012    Neuromuscular disorder ([facility])     Obesity     Thyroid disease     HYPOTHYROIDISM    Unspecified sleep apnea      Past Surgical History Pertinent Negatives:   Procedure Date Noted    ABDOMEN SURGERY 04/13/2022    APPENDECTOMY 04/13/2022     Scheduled Meds:   ampicillin-sulbactam  3,000 mg IntraVENous Q6H    sodium chloride flush  5-40 mL IntraVENous 2 times per day    atorvastatin  40 mg Oral Daily    levothyroxine  25 mcg Oral Daily    gabapentin  300 mg Oral BID    pantoprazole  40 mg Oral Daily    tamsulosin  0.4 mg Oral Daily    insulin lispro  0-4 Units SubCUTAneous 4x Daily AC & HS    bisacodyl  10 mg Rectal Once     Continuous Infusions:   sodium chloride       PRN Meds:sodium chloride flush, sodium chloride, ondansetron **OR** ondansetron, polyethylene glycol, acetaminophen **OR** acetaminophen, potassium chloride, magnesium sulfate, HYDROmorphone    Allergies   Allergen Reactions    Nsaids Other (See Comments)    Plasma Protein Fraction Other (See Comments)     Principal Problem:    Bacteremia due to Enterococcus  Active Problems:    Fever due to infection  Resolved Problems:    * No resolved hospital problems. *    Blood pressure (!) 158/77, pulse 72, temperature 97.2 °F (36.2 °C), resp. rate 16, height 1.778 m (5' 10\"), weight 113.9 kg (251 lb 1.7 oz), SpO2 97%.    Subjective PATIENT 2 weeks ago had right  ureteral stone and developed acute pyelonehphritis.  He had emergency stent placement.  He then 6 days later underwent ureteroscopy and stone extraction.  He still has the stent.  He was not sent home on abx apparently.  He developed fevers chills and came back to ER an has been admitted to hospital.  He is doing well and has no complaints.  Overnight he had fever of101.1.  ObjectiveAFVSS now.  Awake alert oriented  Abd: soft  nontender no rebound no guarding.  Assessment & PlanPT. Had right ureteral stone and developed acute pyelonephritis.  He still has stent in.  Cont with IV abx and then treat with oral.  He coco f/u in clinic to have stent removed.    Giovani Ortiz MD  5/4/2025

## 2025-05-04 NOTE — PROGRESS NOTES
[facility]      Patient:  Yuan Mejias  YOB: 1945  Date of Service: 5/4/2025  MRN: [id number]   Acct: [id number]   Primary Care Physician: Desmond Mayer MD  Advance Directive: Full Code  Admit Date: 5/2/2025       Hospital Day: 1  Portions of this note have been copied forward, however, changed to reflect the most current clinical status of this patient.    CHIEF COMPLAINT Fever    SUBJECTIVE:  He states that he is feeling well today. He has no complaints. He states that he only feels ill when he spikes a fever. Tmax temp overnight-101.1.    CUMULATIVE HOSPITAL STAY:  The patient is a 80 yo male with a PMH of PAF on Eliquis, CKD stage IV, chronic diastolic CHF, hypertension, CAD, aortic valve replacement, and status post right-sided ureteral stent placement on 4/20/2025, stone extraction on 4/26/2025, at [facility] in [place] who presented to [facility] ED on 5/2/2025 with reports of fevers.  He reported fevers up to 102.5 and felt mildly short of breath.  He denied abdominal pain, nausea, or dysuria.  He is scheduled for stent removal on 5/9/2025.  He was placed on cefdinir by his PCP due to intermittent fever.  ED workup revealed K3.4, BUN 28 and creatinine 2.7.  Procalcitonin 0.59.  Troponin 45-> 43.  proBNP 3237.  WBC 7.5, H&H 10.6/32.6 with a platelet count of 148.  Chest x-ray no acute process.  CT chest no acute process.  CT of the abdomen pelvis moderate fecal retention.  Interval placement of right-sided ureteral stent with no residual obstructive change.  He was admitted to hospital medicine for fevers.    He was placed on Zosyn upon admission and given IVF's.  Blood cultures obtained on 5/2/2025 are positive for enterococcus facialis.  Urine culture from 4/30/2025 positive for enterococcus facialis.  He was transitioned from Zosyn to ampicillin based on urine culture culture sensitivity.  Blood culture sensitivities pending.  Repeat blood cultures obtained on 5/4/2025.  Due to  place, and time.      Motor: Weakness (mild) present.         Medications:      sodium chloride        ampicillin IV  2,000 mg IntraVENous Q6H    allopurinol  300 mg Oral Daily    amiodarone  200 mg Oral Daily    apixaban  5 mg Oral BID    aspirin  81 mg Oral Daily    sodium chloride flush  5-40 mL IntraVENous 2 times per day    atorvastatin  40 mg Oral Daily    levothyroxine  25 mcg Oral Daily    gabapentin  300 mg Oral BID    pantoprazole  40 mg Oral Daily    tamsulosin  0.4 mg Oral Daily    insulin lispro  0-4 Units SubCUTAneous 4x Daily AC & HS    bisacodyl  10 mg Rectal Once     sodium chloride flush, sodium chloride, ondansetron **OR** ondansetron, polyethylene glycol, acetaminophen **OR** acetaminophen, potassium chloride, magnesium sulfate, HYDROmorphone  ADULT DIET; Regular; 4 carb choices (60 gm/meal); Low Fat/Low Chol/High Fiber/LEYDA     Lab and other Data:     Recent Labs     05/02/25 2243 05/03/25  0503 05/04/25  0453   WBC 7.8 6.9 4.1*   HGB 10.6* 10.6* 10.3*    127* 145     Recent Labs     05/02/25 2243 05/03/25  0503 05/04/25  0453    137 142   K 3.4* 3.4* 3.4*    102 107   CO2 23 22 24   BUN 28* 28* 25*   CREATININE 2.7* 2.6* 2.2*   GLUCOSE 118* 104* 109*     Recent Labs     05/02/25  2243   AST 25   ALT 27   BILITOT 0.6   ALKPHOS 92     Troponin T: No results for input(s): \"TROPONINI\" in the last 72 hours.  Pro-BNP: No results for input(s): \"BNP\" in the last 72 hours.  INR: No results for input(s): \"INR\" in the last 72 hours.  UA:  Recent Labs     05/02/25  2340   COLORU YELLOW   PHUR 5.5   WBCUA 20*   RBCUA 346*   BACTERIA Negative   CLARITYU CLOUDY*   LEUKOCYTESUR SMALL*   UROBILINOGEN 1.0   BILIRUBINUR Negative   BLOODU LARGE*   GLUCOSEU =>1000     A1C: No results for input(s): \"LABA1C\" in the last 72 hours.  ABG:No results for input(s): \"PHART\", \"TYM4EZK\", \"PO2ART\", \"QQE5VJT\", \"BEART\", \"HGBAE\", \"O2UASMZJ\", \"CARBOXHGBART\" in the last 72 hours.    RAD:   CT ABDOMEN PELVIS WO

## 2025-05-05 ENCOUNTER — RESULTS FOLLOW-UP (OUTPATIENT)
Dept: EMERGENCY DEPT | Age: 80
End: 2025-05-05

## 2025-05-05 PROBLEM — N18.32 ACUTE KIDNEY INJURY SUPERIMPOSED ON STAGE 3B CHRONIC KIDNEY DISEASE (HCC): Status: ACTIVE | Noted: 2025-05-05

## 2025-05-05 PROBLEM — N17.9 ACUTE KIDNEY INJURY SUPERIMPOSED ON STAGE 3B CHRONIC KIDNEY DISEASE (HCC): Status: ACTIVE | Noted: 2025-05-05

## 2025-05-05 LAB
ANION GAP SERPL CALCULATED.3IONS-SCNC: 14 MMOL/L (ref 8–16)
BACTERIA BLD CULT ORG #2: ABNORMAL
BACTERIA BLD CULT ORG #2: ABNORMAL
BACTERIA UR CULT: NORMAL
BASOPHILS # BLD: 0 K/UL (ref 0–0.2)
BASOPHILS NFR BLD: 0.9 % (ref 0–1)
BUN SERPL-MCNC: 25 MG/DL (ref 8–23)
CALCIUM SERPL-MCNC: 8.1 MG/DL (ref 8.8–10.2)
CHLORIDE SERPL-SCNC: 110 MMOL/L (ref 98–107)
CO2 SERPL-SCNC: 19 MMOL/L (ref 22–29)
CREAT SERPL-MCNC: 2.1 MG/DL (ref 0.7–1.2)
ECHO AO ASC DIAM: 3.2 CM
ECHO AO ASCENDING AORTA INDEX: 1.39 CM/M2
ECHO AO ROOT DIAM: 2.4 CM
ECHO AO ROOT INDEX: 1.04 CM/M2
ECHO AO SINUS VALSALVA DIAM: 2.4 CM
ECHO AO SINUS VALSALVA INDEX: 1.04 CM/M2
ECHO AO ST JNCT DIAM: 2.4 CM
ECHO AV AREA PEAK VELOCITY: 1.3 CM2
ECHO AV AREA VTI: 1.4 CM2
ECHO AV AREA/BSA PEAK VELOCITY: 0.6 CM2/M2
ECHO AV AREA/BSA VTI: 0.6 CM2/M2
ECHO AV MEAN GRADIENT: 29 MMHG
ECHO AV MEAN VELOCITY: 2.5 M/S
ECHO AV PEAK GRADIENT: 51 MMHG
ECHO AV PEAK VELOCITY: 3.6 M/S
ECHO AV PEAK VELOCITY: 3.6 M/S
ECHO AV VTI: 71.3 CM
ECHO BSA: 2.37 M2
ECHO EST RA PRESSURE: 3 MMHG
ECHO IVC PROX: 1.2 CM
ECHO LA AREA 2C: 26.6 CM2
ECHO LA AREA 4C: 29.5 CM2
ECHO LA DIAMETER INDEX: 2 CM/M2
ECHO LA DIAMETER: 4.6 CM
ECHO LA MAJOR AXIS: 7.7 CM
ECHO LA MINOR AXIS: 6.9 CM
ECHO LA TO AORTIC ROOT RATIO: 1.92
ECHO LA VOL BP: 95 ML (ref 18–58)
ECHO LA VOL MOD A2C: 86 ML (ref 18–58)
ECHO LA VOL MOD A4C: 95 ML (ref 18–58)
ECHO LA VOL/BSA BIPLANE: 41 ML/M2 (ref 16–34)
ECHO LA VOLUME INDEX MOD A2C: 37 ML/M2 (ref 16–34)
ECHO LA VOLUME INDEX MOD A4C: 41 ML/M2 (ref 16–34)
ECHO LV E' LATERAL VELOCITY: 7.83 CM/S
ECHO LV E' SEPTAL VELOCITY: 7.18 CM/S
ECHO LV EDV A2C: 136 ML
ECHO LV EDV A4C: 151 ML
ECHO LV EDV INDEX A4C: 66 ML/M2
ECHO LV EDV NDEX A2C: 59 ML/M2
ECHO LV EF PHYSICIAN: 65 %
ECHO LV EJECTION FRACTION A2C: 62 %
ECHO LV EJECTION FRACTION A4C: 74 %
ECHO LV EJECTION FRACTION BIPLANE: 68 % (ref 55–100)
ECHO LV ESV A2C: 52 ML
ECHO LV ESV A4C: 39 ML
ECHO LV ESV INDEX A2C: 23 ML/M2
ECHO LV ESV INDEX A4C: 17 ML/M2
ECHO LV FRACTIONAL SHORTENING: 18 % (ref 28–44)
ECHO LV INTERNAL DIMENSION DIASTOLE INDEX: 2.13 CM/M2
ECHO LV INTERNAL DIMENSION DIASTOLIC: 4.9 CM (ref 4.2–5.9)
ECHO LV INTERNAL DIMENSION SYSTOLIC INDEX: 1.74 CM/M2
ECHO LV INTERNAL DIMENSION SYSTOLIC: 4 CM
ECHO LV IVSD: 2 CM (ref 0.6–1)
ECHO LV MASS 2D: 469.7 G (ref 88–224)
ECHO LV MASS INDEX 2D: 204.2 G/M2 (ref 49–115)
ECHO LV POSTERIOR WALL DIASTOLIC: 1.9 CM (ref 0.6–1)
ECHO LV RELATIVE WALL THICKNESS RATIO: 0.78
ECHO LVOT AREA: 3.5 CM2
ECHO LVOT AV VTI INDEX: 0.4
ECHO LVOT DIAM: 2.1 CM
ECHO LVOT MEAN GRADIENT: 4 MMHG
ECHO LVOT PEAK GRADIENT: 8 MMHG
ECHO LVOT PEAK VELOCITY: 1.4 M/S
ECHO LVOT STROKE VOLUME INDEX: 42.9 ML/M2
ECHO LVOT SV: 98.7 ML
ECHO LVOT VTI: 28.5 CM
ECHO MV A VELOCITY: 0.66 M/S
ECHO MV AREA VTI: 3.4 CM2
ECHO MV E DECELERATION TIME (DT): 215 MS
ECHO MV E VELOCITY: 0.69 M/S
ECHO MV E/A RATIO: 1.05
ECHO MV E/E' LATERAL: 8.81
ECHO MV E/E' RATIO (AVERAGED): 9.21
ECHO MV E/E' SEPTAL: 9.61
ECHO MV LVOT VTI INDEX: 1.03
ECHO MV MAX VELOCITY: 1 M/S
ECHO MV MEAN GRADIENT: 2 MMHG
ECHO MV MEAN VELOCITY: 0.7 M/S
ECHO MV PEAK GRADIENT: 4 MMHG
ECHO MV VTI: 29.4 CM
ECHO RA AREA 4C: 23.5 CM2
ECHO RA END SYSTOLIC VOLUME APICAL 4 CHAMBER INDEX BSA: 29 ML/M2
ECHO RA VOLUME: 66 ML
ECHO RIGHT VENTRICULAR SYSTOLIC PRESSURE (RVSP): 28 MMHG
ECHO RV BASAL DIMENSION: 4.2 CM
ECHO RV INTERNAL DIMENSION: 2.8 CM
ECHO RV LONGITUDINAL DIMENSION: 5.3 CM
ECHO RV MID DIMENSION: 3.4 CM
ECHO RV TAPSE: 2.5 CM (ref 1.7–?)
ECHO TV REGURGITANT MAX VELOCITY: 2.51 M/S
ECHO TV REGURGITANT PEAK GRADIENT: 25 MMHG
EOSINOPHIL # BLD: 0.1 K/UL (ref 0–0.6)
EOSINOPHIL NFR BLD: 3.2 % (ref 0–5)
ERYTHROCYTE [DISTWIDTH] IN BLOOD BY AUTOMATED COUNT: 13.3 % (ref 11.5–14.5)
GLUCOSE BLD-MCNC: 100 MG/DL (ref 70–99)
GLUCOSE BLD-MCNC: 110 MG/DL (ref 70–99)
GLUCOSE BLD-MCNC: 145 MG/DL (ref 70–99)
GLUCOSE BLD-MCNC: 89 MG/DL (ref 70–99)
GLUCOSE SERPL-MCNC: 106 MG/DL (ref 70–99)
HCT VFR BLD AUTO: 33.1 % (ref 42–52)
HGB BLD-MCNC: 10 G/DL (ref 14–18)
IMM GRANULOCYTES # BLD: 0 K/UL
LYMPHOCYTES # BLD: 0.8 K/UL (ref 1.1–4.5)
LYMPHOCYTES NFR BLD: 23.6 % (ref 20–40)
MCH RBC QN AUTO: 30.2 PG (ref 27–31)
MCHC RBC AUTO-ENTMCNC: 30.2 G/DL (ref 33–37)
MCV RBC AUTO: 100 FL (ref 80–94)
MONOCYTES # BLD: 0.5 K/UL (ref 0–0.9)
MONOCYTES NFR BLD: 14.7 % (ref 0–10)
NEUTROPHILS # BLD: 1.9 K/UL (ref 1.5–7.5)
NEUTS SEG NFR BLD: 57 % (ref 50–65)
ORGANISM: ABNORMAL
PERFORMED ON: ABNORMAL
PERFORMED ON: NORMAL
PLATELET # BLD AUTO: 114 K/UL (ref 130–400)
PMV BLD AUTO: 10 FL (ref 9.4–12.4)
POTASSIUM SERPL-SCNC: 3.6 MMOL/L (ref 3.5–5)
RBC # BLD AUTO: 3.31 M/UL (ref 4.7–6.1)
SODIUM SERPL-SCNC: 143 MMOL/L (ref 136–145)
WBC # BLD AUTO: 3.4 K/UL (ref 4.8–10.8)

## 2025-05-05 PROCEDURE — 6360000002 HC RX W HCPCS: Performed by: STUDENT IN AN ORGANIZED HEALTH CARE EDUCATION/TRAINING PROGRAM

## 2025-05-05 PROCEDURE — 85025 COMPLETE CBC W/AUTO DIFF WBC: CPT

## 2025-05-05 PROCEDURE — 82962 GLUCOSE BLOOD TEST: CPT

## 2025-05-05 PROCEDURE — 6370000000 HC RX 637 (ALT 250 FOR IP): Performed by: STUDENT IN AN ORGANIZED HEALTH CARE EDUCATION/TRAINING PROGRAM

## 2025-05-05 PROCEDURE — 99231 SBSQ HOSP IP/OBS SF/LOW 25: CPT | Performed by: NURSE PRACTITIONER

## 2025-05-05 PROCEDURE — 1200000000 HC SEMI PRIVATE

## 2025-05-05 PROCEDURE — 36415 COLL VENOUS BLD VENIPUNCTURE: CPT

## 2025-05-05 PROCEDURE — 2500000003 HC RX 250 WO HCPCS: Performed by: STUDENT IN AN ORGANIZED HEALTH CARE EDUCATION/TRAINING PROGRAM

## 2025-05-05 PROCEDURE — 2580000003 HC RX 258: Performed by: STUDENT IN AN ORGANIZED HEALTH CARE EDUCATION/TRAINING PROGRAM

## 2025-05-05 PROCEDURE — 93306 TTE W/DOPPLER COMPLETE: CPT | Performed by: INTERNAL MEDICINE

## 2025-05-05 PROCEDURE — 80048 BASIC METABOLIC PNL TOTAL CA: CPT

## 2025-05-05 PROCEDURE — 94760 N-INVAS EAR/PLS OXIMETRY 1: CPT

## 2025-05-05 PROCEDURE — 6370000000 HC RX 637 (ALT 250 FOR IP): Performed by: NURSE PRACTITIONER

## 2025-05-05 RX ADMIN — GABAPENTIN 300 MG: 300 CAPSULE ORAL at 08:32

## 2025-05-05 RX ADMIN — ASPIRIN 81 MG: 81 TABLET, COATED ORAL at 08:32

## 2025-05-05 RX ADMIN — DOCUSATE SODIUM 100 MG: 100 CAPSULE, LIQUID FILLED ORAL at 08:32

## 2025-05-05 RX ADMIN — TAMSULOSIN HYDROCHLORIDE 0.4 MG: 0.4 CAPSULE ORAL at 08:32

## 2025-05-05 RX ADMIN — AMPICILLIN SODIUM 2000 MG: 2 INJECTION, POWDER, FOR SOLUTION INTRAMUSCULAR; INTRAVENOUS at 18:08

## 2025-05-05 RX ADMIN — APIXABAN 5 MG: 5 TABLET, FILM COATED ORAL at 20:31

## 2025-05-05 RX ADMIN — ALLOPURINOL 300 MG: 300 TABLET ORAL at 08:32

## 2025-05-05 RX ADMIN — GABAPENTIN 300 MG: 300 CAPSULE ORAL at 20:31

## 2025-05-05 RX ADMIN — AMPICILLIN SODIUM 2000 MG: 2 INJECTION, POWDER, FOR SOLUTION INTRAMUSCULAR; INTRAVENOUS at 08:39

## 2025-05-05 RX ADMIN — DOCUSATE SODIUM 100 MG: 100 CAPSULE, LIQUID FILLED ORAL at 20:31

## 2025-05-05 RX ADMIN — AMPICILLIN SODIUM 2000 MG: 2 INJECTION, POWDER, FOR SOLUTION INTRAMUSCULAR; INTRAVENOUS at 03:03

## 2025-05-05 RX ADMIN — PANTOPRAZOLE SODIUM 40 MG: 40 TABLET, DELAYED RELEASE ORAL at 08:32

## 2025-05-05 RX ADMIN — SODIUM CHLORIDE, PRESERVATIVE FREE 10 ML: 5 INJECTION INTRAVENOUS at 08:33

## 2025-05-05 RX ADMIN — AMIODARONE HYDROCHLORIDE 200 MG: 200 TABLET ORAL at 08:32

## 2025-05-05 RX ADMIN — ATORVASTATIN CALCIUM 40 MG: 40 TABLET, FILM COATED ORAL at 08:32

## 2025-05-05 RX ADMIN — APIXABAN 5 MG: 5 TABLET, FILM COATED ORAL at 08:32

## 2025-05-05 RX ADMIN — LEVOTHYROXINE SODIUM 25 MCG: 25 TABLET ORAL at 05:17

## 2025-05-05 NOTE — PROGRESS NOTES
Daily Progress Note    Date:2025  Patient: Yuan Mejias  : 1945  MRN:[id number]  CODE:Full Code No additional code details  PCP:Desmond Mayer MD    Admit Date: 2025 10:30 PM   LOS: 2 days       Subjective:    No acute events overnight.  No fevers or chills.  No abdominal pain, flank pain, nausea or vomiting.      Summary: 79-year-old male recently hospitalized with right ureteral stone obstruction with pyelonephritis in April in [place] s/p ureteral stenting on 2025, later followed by stone extraction, presented with intermittent fevers and rigors.  Seen in consultation by urology.  CT in house here did not reveal any recurrent hydronephrosis, ureteral stent in good position.  Appears patient was not on antibiotics at discharge for pyelonephritis.  He was found to have enterococcal bacteremia on 2 of 2 blood cultures, sensitive to ampicillin.  Does also have history of aortic valve replacement.  Echo obtained.  Blood cultures repeated on .  He did have some hematuria which subsequently resolved.  Eliquis for A-fib resumed without issue.      Objective:      Vital signs in last 24 hours:  Patient Vitals for the past 24 hrs:   BP Temp Temp src Pulse Resp SpO2   25 1115 (!) 110/59 97.5 °F (36.4 °C) Temporal 70 18 100 %   25 0735 (!) 145/91 97.9 °F (36.6 °C) Temporal 71 18 98 %   25 0640 -- -- -- -- -- 91 %   25 0518 122/72 97.2 °F (36.2 °C) Temporal 73 16 96 %   25 0022 (!) 146/80 97.2 °F (36.2 °C) -- 72 16 100 %   25 1955 113/71 97.5 °F (36.4 °C) Temporal 72 16 98 %   25 1745 (!) 140/84 -- -- -- -- --   25 1627 (!) 170/85 98.2 °F (36.8 °C) -- 71 18 97 %   25 1617 103/63 98.6 °F (37 °C) Temporal 72 18 99 %     Physical exam    General: No acute distress  Cardiac: Normal rate, regular rhythm, murmur present  Respiratory: No wheezes or rhonchi  Abdomen: Nontender, nondistended  Extremities: Warm and dry      Lab Review   Recent Results  Piggyback:100]  Out: 900 [Urine:900]  No intake/output data recorded.      Current Facility-Administered Medications:     ampicillin (OMNIPEN) 2,000 mg in sodium chloride 0.9 % 100 mL IVPB ([id number]), 2,000 mg, IntraVENous, Q6H, Herbert Scott MD, Stopped at 05/05/25 1022    allopurinol (ZYLOPRIM) tablet 300 mg, 300 mg, Oral, Daily, Jacklyn Jade, APRN, 300 mg at 05/05/25 0832    amiodarone (CORDARONE) tablet 200 mg, 200 mg, Oral, Daily, Jacklyn Jade, APRN, 200 mg at 05/05/25 0832    apixaban (ELIQUIS) tablet 5 mg, 5 mg, Oral, BID, Jacklyn Jade, APRN, 5 mg at 05/05/25 0832    aspirin EC tablet 81 mg, 81 mg, Oral, Daily, Jacklyn Jade, APRN, 81 mg at 05/05/25 0832    docusate sodium (COLACE) capsule 100 mg, 100 mg, Oral, BID, Jacklyn Jade, APRN, 100 mg at 05/05/25 0832    sodium chloride flush 0.9 % injection 5-40 mL, 5-40 mL, IntraVENous, 2 times per day, Emeterio Rossi MD, 10 mL at 05/05/25 0833    sodium chloride flush 0.9 % injection 5-40 mL, 5-40 mL, IntraVENous, PRN, Emeterio Rossi MD    0.9 % sodium chloride infusion, , IntraVENous, PRN, Emeterio Rossi MD    ondansetron (ZOFRAN-ODT) disintegrating tablet 4 mg, 4 mg, Oral, Q8H PRN **OR** ondansetron (ZOFRAN) injection 4 mg, 4 mg, IntraVENous, Q6H PRN, Emeterio Rossi MD    polyethylene glycol (GLYCOLAX) packet 17 g, 17 g, Oral, Daily PRN, Emeterio Rossi MD    acetaminophen (TYLENOL) tablet 650 mg, 650 mg, Oral, Q6H PRN, 650 mg at 05/03/25 2007 **OR** acetaminophen (TYLENOL) suppository 650 mg, 650 mg, Rectal, Q6H PRN, Emeterio Rossi MD    potassium chloride 10 mEq/100 mL IVPB (Peripheral Line), 10 mEq, IntraVENous, PRN, Emeterio Rossi MD    magnesium sulfate 2000 mg in 50 mL IVPB premix, 2,000 mg, IntraVENous, PRN, Emeterio Rossi MD    HYDROmorphone HCl PF (DILAUDID) injection 0.5 mg, 0.5 mg, IntraVENous, Q4H PRN, Emeterio Rossi MD    atorvastatin (LIPITOR) tablet 40 mg, 40 mg, Oral,  obstructing stone  - Continue Flomax    Hypothyroidism  --Levothyroxine    GERD  - PPI    Diabetes mellitus  - Glucose monitoring, sliding scale insulin correction as needed  - Gabapentin for diabetic neuropathy    Cardiovascular disease  - Aspirin, Eliquis, statin    Paroxysmal A-fib  - Amiodarone for rhythm control  - Eliquis for anticoagulation    Disposition: Possible discharge home in the next 1 to 2 days if repeat blood cultures remain negative      Herbert Scott MD 5/5/2025 12:50 PM

## 2025-05-05 NOTE — PROGRESS NOTES
Physician Progress Note      PATIENT:               CHRISTIANE SALDANA  CSN #:                  [id number]  :                       1945  ADMIT DATE:       2025 10:30 PM  DISCH DATE:  RESPONDING  PROVIDER #:        DENI BATISTA          QUERY TEXT:    Based on your medical judgment, please clarify these findings and document if   any of the following are being evaluated and/or treated:    The clinical indicators include:  - Male patient, Age: 79 per the H&P  - Past medical History of DM, Aortic Valve replacement, CHF, CKD and AFIB per   the H&P  -Eliquis per eh H&P  Options provided:  -- Secondary hypercoagulable state in a patient with atrial fibrillation  -- Other - I will add my own diagnosis  -- Disagree - Not applicable / Not valid  -- Disagree - Clinically unable to determine / Unknown  -- Refer to Clinical Documentation Reviewer    PROVIDER RESPONSE TEXT:    This patient has secondary hypercoagulable state in a patient with atrial   fibrillation.    Query created by: Mary Grace Gusman on 2025 11:56 AM      Electronically signed by:  DENI BATISTA 2025 12:21 PM

## 2025-05-05 NOTE — PLAN OF CARE
Care plan reviewed      Problem: Chronic Conditions and Co-morbidities  Goal: Patient's chronic conditions and co-morbidity symptoms are monitored and maintained or improved  5/4/2025 2223 by Dina Samuel RN  Outcome: Progressing  5/4/2025 1623 by Des Siddiqui  Outcome: Progressing     Problem: Safety - Adult  Goal: Free from fall injury  5/4/2025 2223 by Dina Samuel RN  Outcome: Progressing  5/4/2025 1623 by Des Siddiqui  Outcome: Progressing     Problem: ABCDS Injury Assessment  Goal: Absence of physical injury  5/4/2025 2223 by Dina Smauel RN  Outcome: Progressing  5/4/2025 1623 by Des Siddiqui  Outcome: Progressing

## 2025-05-05 NOTE — PROGRESS NOTES
Urology Progress Note      SUBJECTIVE:  Sitting up on the side of the bed. Denies flank pain, nausea or vomiting.     OBJECTIVE:   Review of Systems     Physical  VITALS:  BP (!) 145/91   Pulse 71   Temp 97.9 °F (36.6 °C) (Temporal)   Resp 18   Ht 1.778 m (5' 10\")   Wt 113.9 kg (251 lb 1.7 oz)   SpO2 98%   BMI 36.03 kg/m²   TEMPERATURE:  Current - Temp: 97.9 °F (36.6 °C); Max - Temp  Av.9 °F (36.6 °C)  Min: 97.2 °F (36.2 °C)  Max: 98.6 °F (37 °C)   24 HR I&O   Intake/Output Summary (Last 24 hours) at 2025 0816  Last data filed at 2025 0926  Gross per 24 hour   Intake 10 ml   Output --   Net 10 ml       Physical Exam   BACK: inspection of back is normal and no tenderness in spine or flanks  ABDOMEN:  soft, non-distended, and non-tender  HEART:  normal rate  CHEST:  Normal respiratory effort   GENITAL/URINARY:  Voiding pink/bonita colored urine. Good UOP. String present     Data  CBC:   Recent Labs     25  0503 25  0453 25  0213   WBC 6.9 4.1* 3.4*   HGB 10.6* 10.3* 10.0*   HCT 34.7* 32.7* 33.1*   * 145 114*     BMP:    Recent Labs     25  0503 25  0453 25  0213    142 143   K 3.4* 3.4* 3.6    107 110*   CO2 22 24 19*   BUN 28* 25* 25*   CREATININE 2.6* 2.2* 2.1*   GLUCOSE 104* 109* 106*       Recent Labs     250   LABURIN <50,000 CFU/ml mixed skin/urogenital marie. No further workup     Recent Labs     25   BC  Bottle volume = 6 ml   Bottle volume = 7 ml  *  Isolated from Aerobic and Anaerobic bottle  Refer to (blood culture collected 25@2345) for sensitivity results  No further workup       Recent Labs     25   BLOODCULT2  Bottle volume = 7 ml   Bottle volume = 10 ml  *  Isolated from Aerobic and Anaerobic bottle  Identification by Molecular PCR           U/A:    Lab Results   Component Value Date/Time    COLORU YELLOW 2025 11:40 PM    PHUR 5.5 2025 11:40 PM    PHUR 5.5 2022 11:55 PM  M.D. Date:     05/03/2025 Time:    00:27     XR CHEST PORTABLE  Result Date: 5/3/2025   No acute cardiopulmonary disease and no change from 04/30/2025.    ______________________________________ Electronically signed by: YONY MORALES M.D. Date:     05/03/2025 Time:    00:13     NM LUNG SCAN PERFUSION ONLY  Result Date: 4/30/2025  Normal perfusion lung scan.   ______________________________________ Electronically signed by: PETER LYNN M.D. Date:     04/30/2025 Time:    16:40     CT HEAD WO CONTRAST  Result Date: 4/30/2025  Mild  atrophy and microvascular white matter changes without acute intracranial process.  All CT scans are performed using dose optimization techniques as appropriate to the performed exam and include at least one of the following: Automated exposure control, adjustment of the mA and/or kV according to size, and the use of iterative reconstruction technique.  ______________________________________ Electronically signed by: LYN HERR M.D. Date:     04/30/2025 Time:    14:55     XR CHEST PORTABLE  Result Date: 4/30/2025   1.  No acute findings in the chest.    ______________________________________ Electronically signed by: ONEIL GARCIA M.D. Date:     04/30/2025 Time:    12:35        ASSESSMENT AND PLAN    Patient Active Problem List   Diagnosis    Hypertension    Family history of early CAD    Obesity    Aortic stenosis    CAD (coronary artery disease)    Hyperlipemia    Diabetes mellitus (HCC)    S/P AVR    History of coronary artery stent placement    S/P aortic valve replacement with bioprosthetic valve    Valvular heart disease    Chronic diastolic CHF (congestive heart failure) ([facility])    Chronic bilateral low back pain with right-sided sciatica    Tachycardia induced cardiomyopathy (HCC)    Paroxysmal A-fib ([facility])    Chest pain    Symptomatic bradycardia    Pacemaker    New onset atrial fibrillation (HCC)    Atrial fibrillation (HCC)    Fever due to infection    Bacteremia due to  Enterococcus     Urosepsis/pyelonephritis. Blood and urine culture revealing enterococcus. Now on ampicillin day #3. Now afebrile. Would recommend at least 14 days culture specific antibiotics.     S/P right URS stone removal and stent placement at [facility] with Dr. Youngblood. Will plan to keep the stent in place while on antibiotics. Plan to remove stent in office 10 days while he is still on oral abx- pt has string     Alla Balderas, CHERYL - CNP  5/5/2025 8:16 AM

## 2025-05-05 NOTE — CARE COORDINATION
Case Management Assessment  Initial Evaluation    Date/Time of Evaluation: 5/5/2025 10:10 AM  Assessment Completed by: Monet Pham RN    If patient is discharged prior to next notation, then this note serves as note for discharge by case management.    Patient Name: Yuan Mejias                   YOB: 1945  Diagnosis: Fever of unknown origin [R50.9]  Postoperative fever [R50.82]  Fever due to infection [B99.9]                   Date / Time: 5/2/2025 10:30 PM    Patient Admission Status: Inpatient   Readmission Risk (Low < 19, Mod (19-27), High > 27): Readmission Risk Score: 19.6    Current PCP: Desmond Mayer MD  PCP verified by CM? Yes    Chart Reviewed: Yes      History Provided by: Patient  Patient Orientation: Alert and Oriented, Person, Place, Situation    Patient Cognition: Alert    Hospitalization in the last 30 days (Readmission):  No    If yes, Readmission Assessment in CM Navigator will be completed.    Advance Directives:      Code Status: Full Code   Patient's Primary Decision Maker is: Legal Next of Kin    Primary Decision Maker: Soha Mejias - Spouse - [phone]    Discharge Planning:    Patient lives with: Spouse/Significant Other Type of Home: House  Primary Care Giver: Self  Patient Support Systems include: Spouse/Significant Other   Current Financial resources: Medicare,  ([place])  Current community resources: None  Current services prior to admission: None            Current DME:              Type of Home Care services:  None    ADLS  Prior functional level: Independent in ADLs/IADLs  Current functional level: Independent in ADLs/IADLs    PT AM-PAC:   /24  OT AM-PAC:   /24    Family can provide assistance at DC: Yes  Would you like Case Management to discuss the discharge plan with any other family members/significant others, and if so, who? Yes (spouse and children)  Plans to Return to Present Housing: Yes  Other Identified Issues/Barriers to RETURNING to current  housing: N/A  Potential Assistance needed at discharge: N/A            Potential DME:    Patient expects to discharge to: House  Plan for transportation at discharge: Family    Financial    Payor: [employer] / Plan: [employer] [place] / Product Type: *No Product type* /     Does insurance require precert for SNF: Yes    Potential assistance Purchasing Medications: No  Meds-to-Beds request:        [facility] [address] [place], [place] - [address] [phone] - F [phone]  [address] [address]  Phone: [phone] Fax: [phone]    [facility] - [place], [place] - [address] [facility] [phone] - F [phone]  [address] [facility] [address]  Phone: [phone] Fax: [phone]    [facility] - [place], [place] - [address] [phone] - F [phone]  [address] [address]  Phone: [phone] Fax: [phone]      Notes:    Factors facilitating achievement of predicted outcomes: Family support, Cooperative, and Pleasant    Barriers to discharge: N/A    Additional Case Management Notes:   Patient lives at home with his spouse and plans to return home at discharge.  Prior to admission, patient was independent with ADLs and drives.  He is not interested in Home Health.    The Plan for Transition of Care is related to the following treatment goals of Fever of unknown origin [R50.9]  Postoperative fever [R50.82]  Fever due to infection [B99.9]    IF APPLICABLE: The Patient and/or patient representative Yuan and his family were provided with a choice of provider and agrees with the discharge plan. Freedom of choice list with basic dialogue that supports the patient's individualized plan of care/goals and shares the quality data associated with the providers was provided to: Patient   Patient Representative Name:       The Patient and/or Patient Representative Agree with the Discharge Plan? Yes    Monet Pham RN  Case Management

## 2025-05-06 ENCOUNTER — APPOINTMENT (OUTPATIENT)
Dept: PAIN MANAGEMENT | Age: 80
DRG: 690 | End: 2025-05-06
Payer: OTHER GOVERNMENT

## 2025-05-06 VITALS
HEIGHT: 70 IN | TEMPERATURE: 97.5 F | WEIGHT: 251.1 LBS | HEART RATE: 71 BPM | RESPIRATION RATE: 16 BRPM | DIASTOLIC BLOOD PRESSURE: 85 MMHG | BODY MASS INDEX: 35.95 KG/M2 | SYSTOLIC BLOOD PRESSURE: 143 MMHG | OXYGEN SATURATION: 98 %

## 2025-05-06 LAB
GLUCOSE BLD-MCNC: 100 MG/DL (ref 70–99)
GLUCOSE BLD-MCNC: 112 MG/DL (ref 70–99)
PERFORMED ON: ABNORMAL
PERFORMED ON: ABNORMAL

## 2025-05-06 PROCEDURE — 2500000003 HC RX 250 WO HCPCS: Performed by: STUDENT IN AN ORGANIZED HEALTH CARE EDUCATION/TRAINING PROGRAM

## 2025-05-06 PROCEDURE — 2580000003 HC RX 258: Performed by: STUDENT IN AN ORGANIZED HEALTH CARE EDUCATION/TRAINING PROGRAM

## 2025-05-06 PROCEDURE — 82962 GLUCOSE BLOOD TEST: CPT

## 2025-05-06 PROCEDURE — 6360000002 HC RX W HCPCS: Performed by: STUDENT IN AN ORGANIZED HEALTH CARE EDUCATION/TRAINING PROGRAM

## 2025-05-06 PROCEDURE — 6370000000 HC RX 637 (ALT 250 FOR IP): Performed by: STUDENT IN AN ORGANIZED HEALTH CARE EDUCATION/TRAINING PROGRAM

## 2025-05-06 PROCEDURE — 94760 N-INVAS EAR/PLS OXIMETRY 1: CPT

## 2025-05-06 PROCEDURE — 99231 SBSQ HOSP IP/OBS SF/LOW 25: CPT | Performed by: NURSE PRACTITIONER

## 2025-05-06 PROCEDURE — 6370000000 HC RX 637 (ALT 250 FOR IP): Performed by: NURSE PRACTITIONER

## 2025-05-06 RX ORDER — AMOXICILLIN 875 MG/1
875 TABLET, COATED ORAL 2 TIMES DAILY
Qty: 24 TABLET | Refills: 0 | Status: SHIPPED | OUTPATIENT
Start: 2025-05-06 | End: 2025-05-18

## 2025-05-06 RX ADMIN — GABAPENTIN 300 MG: 300 CAPSULE ORAL at 09:33

## 2025-05-06 RX ADMIN — ATORVASTATIN CALCIUM 40 MG: 40 TABLET, FILM COATED ORAL at 09:33

## 2025-05-06 RX ADMIN — AMPICILLIN SODIUM 2000 MG: 2 INJECTION, POWDER, FOR SOLUTION INTRAMUSCULAR; INTRAVENOUS at 09:37

## 2025-05-06 RX ADMIN — AMIODARONE HYDROCHLORIDE 200 MG: 200 TABLET ORAL at 09:33

## 2025-05-06 RX ADMIN — LEVOTHYROXINE SODIUM 25 MCG: 25 TABLET ORAL at 09:33

## 2025-05-06 RX ADMIN — ALLOPURINOL 300 MG: 300 TABLET ORAL at 09:33

## 2025-05-06 RX ADMIN — DOCUSATE SODIUM 100 MG: 100 CAPSULE, LIQUID FILLED ORAL at 09:33

## 2025-05-06 RX ADMIN — ASPIRIN 81 MG: 81 TABLET, COATED ORAL at 09:34

## 2025-05-06 RX ADMIN — AMPICILLIN SODIUM 2000 MG: 2 INJECTION, POWDER, FOR SOLUTION INTRAMUSCULAR; INTRAVENOUS at 03:26

## 2025-05-06 RX ADMIN — PANTOPRAZOLE SODIUM 40 MG: 40 TABLET, DELAYED RELEASE ORAL at 09:34

## 2025-05-06 RX ADMIN — SODIUM CHLORIDE, PRESERVATIVE FREE 10 ML: 5 INJECTION INTRAVENOUS at 09:34

## 2025-05-06 RX ADMIN — TAMSULOSIN HYDROCHLORIDE 0.4 MG: 0.4 CAPSULE ORAL at 09:33

## 2025-05-06 RX ADMIN — APIXABAN 5 MG: 5 TABLET, FILM COATED ORAL at 09:34

## 2025-05-06 NOTE — PROGRESS NOTES
CLINICAL PHARMACY NOTE: MEDS TO BEDS    Total # of Prescriptions Filled: 1   The following medications were delivered to the patient:  Current Discharge Medication List        START taking these medications    Details   amoxicillin (AMOXIL) 875 MG tablet Take 1 tablet by mouth 2 times daily for 12 days  Qty: 24 tablet, Refills: 0               Additional Documentation:  Delivered to patients family bedside. No copay.

## 2025-05-06 NOTE — PROGRESS NOTES
Urology Progress Note      SUBJECTIVE:  Up ambulating in the room upon entering. Denies flank pain or dysuria. Complains of night sweats but has remained afebrile     OBJECTIVE:   Review of Systems     Physical  VITALS:  BP (!) 166/83   Pulse 69   Temp 97.9 °F (36.6 °C) (Temporal)   Resp 18   Ht 1.778 m (5' 10\")   Wt 113.9 kg (251 lb 1.7 oz)   SpO2 99%   BMI 36.03 kg/m²   TEMPERATURE:  Current - Temp: 97.9 °F (36.6 °C); Max - Temp  Av.7 °F (36.5 °C)  Min: 97.3 °F (36.3 °C)  Max: 98.2 °F (36.8 °C)   24 HR I&O No intake or output data in the 24 hours ending 25 0752    Physical Exam   BACK: inspection of back is normal and no tenderness in spine or flanks  ABDOMEN:  soft, non-distended, and non-tender  HEART:  normal rate  CHEST:  Normal respiratory effort   GENITAL/URINARY:  voiding, string present     Data  CBC:   Recent Labs     25  0453 25  0213   WBC 4.1* 3.4*   HGB 10.3* 10.0*   HCT 32.7* 33.1*    114*     BMP:    Recent Labs     25  0453 25  0213    143   K 3.4* 3.6    110*   CO2 24 19*   BUN 25* 25*   CREATININE 2.2* 2.1*   GLUCOSE 109* 106*       No results for input(s): \"LABURIN\" in the last 72 hours.  Recent Labs     25  0914   BC No Growth to date.  Any change in status will be called.     Recent Labs     25  0914   BLOODCULT2 No Growth to date.  Any change in status will be called.         U/A:    Lab Results   Component Value Date/Time    COLORU YELLOW 2025 11:40 PM    PHUR 5.5 2025 11:40 PM    PHUR 5.5 2022 11:55 PM    WBCUA 20 2025 11:40 PM    RBCUA 346 2025 11:40 PM    BACTERIA Negative 2025 11:40 PM    CLARITYU CLOUDY 2025 11:40 PM    LEUKOCYTESUR SMALL 2025 11:40 PM    UROBILINOGEN 1.0 2025 11:40 PM    BILIRUBINUR Negative 2025 11:40 PM    BLOODU LARGE 2025 11:40 PM    GLUCOSEU =>1000 2025 11:40 PM       Radiology:   CT ABDOMEN PELVIS WO CONTRAST Additional  Contrast? None  Result Date: 5/3/2025   1. Interval placement of a right-sided ureteral stent with no residual obstructive change. 2. Moderate fecal retention. 3. Again noted is a nodular density at the tail the pancreas and splenic hilum.  Indeterminate for pancreatic lesion versus volume averaging with accessory splenic tissue.  MRI may be the of benefit to further assess if not already performed. 4. Hiatal hernia.   All CT scans are performed using dose optimization techniques as appropriate to the performed exam and includes at least one of the following:  Automated exposure control, adjustment of the mA and/or kV according to size, and the use of iterative reconstruction technique.  All CT scans are performed using dose optimization techniques as appropriate to the performed exam and include at least one of the following: Automated exposure control, adjustment of the mA and/or kV according to size, and the use of iterative reconstruction technique.  ______________________________________ Electronically signed by: BECKY VALLES M.D. Date:     05/03/2025 Time:    00:30     CT CHEST WO CONTRAST  Result Date: 5/3/2025  Impression:  No acute cardiopulmonary disease  All CT scans are performed using dose optimization techniques as appropriate to the performed exam and include at least one of the following: Automated exposure control, adjustment of the mA and/or kV according to size, and the use of iterative reconstruction technique.  ______________________________________ Electronically signed by: YONY MORALES M.D. Date:     05/03/2025 Time:    00:27     XR CHEST PORTABLE  Result Date: 5/3/2025   No acute cardiopulmonary disease and no change from 04/30/2025.    ______________________________________ Electronically signed by: YONY MORALES M.D. Date:     05/03/2025 Time:    00:13     NM LUNG SCAN PERFUSION ONLY  Result Date: 4/30/2025  Normal perfusion lung scan.   ______________________________________  Electronically signed by: PETER LYNN M.D. Date:     04/30/2025 Time:    16:40     CT HEAD WO CONTRAST  Result Date: 4/30/2025  Mild  atrophy and microvascular white matter changes without acute intracranial process.  All CT scans are performed using dose optimization techniques as appropriate to the performed exam and include at least one of the following: Automated exposure control, adjustment of the mA and/or kV according to size, and the use of iterative reconstruction technique.  ______________________________________ Electronically signed by: LYN HERR M.D. Date:     04/30/2025 Time:    14:55     XR CHEST PORTABLE  Result Date: 4/30/2025   1.  No acute findings in the chest.    ______________________________________ Electronically signed by: ONEIL GARCIA M.D. Date:     04/30/2025 Time:    12:35          ASSESSMENT AND PLAN    Patient Active Problem List   Diagnosis    Hypertension    Family history of early CAD    Obesity    Aortic stenosis    CAD (coronary artery disease)    Hyperlipemia    Diabetes mellitus (HCC)    S/P AVR    History of coronary artery stent placement    S/P aortic valve replacement with bioprosthetic valve    Valvular heart disease    Chronic diastolic CHF (congestive heart failure) ([facility])    Chronic bilateral low back pain with right-sided sciatica    Tachycardia induced cardiomyopathy (HCC)    Paroxysmal A-fib ([facility])    Chest pain    Symptomatic bradycardia    Pacemaker    New onset atrial fibrillation (HCC)    Atrial fibrillation (HCC)    Fever due to infection    Bacteremia due to Enterococcus    Acute kidney injury superimposed on stage 3b chronic kidney disease (HCC)     Bacteremia/pyelonephritis. Blood and urine culture positive for enterococcus. Repeat cultures no growth. Ampicillin day #4. Will need to go home with at least 14 days culture specific antibiotics. Will plan to pull his stent in office 7-10 days. Continue Flomax while stent is in place. Hematuria is expected with a  stent- discussed with patient.     CHERYL Henderson - CNP  5/6/2025 7:52 AM

## 2025-05-06 NOTE — DISCHARGE SUMMARY
Discharge Summary    NAME: Yuan Mejias  :  1945  MRN:  [id number]    Admit date:  2025  Discharge date:  2025    Advance Directive: Full Code    Consults: urology    Primary Care Physician:  Desmond Mayer MD    Discharge Diagnoses:  Principal Problem:    Bacteremia due to Enterococcus  Active Problems:    Diabetes mellitus (HCC)    S/P aortic valve replacement with bioprosthetic valve    Chronic diastolic CHF (congestive heart failure) (HCC)    Paroxysmal A-fib (HCC)    Fever due to infection    Acute kidney injury superimposed on stage 3b chronic kidney disease (HCC)          Significant Diagnostic Studies:   CT ABDOMEN PELVIS WO CONTRAST Additional Contrast? None  Result Date: 5/3/2025  EXAM:  CT OF THE ABDOMEN AND PELVIS WITHOUT CONTRAST.  TECHNIQUE:  CT of the abdomen and pelvis was performed without the use of contrast.  Multiplanar reformats were performed.  HISTORY:  Femur shortness of breath.  Ureteral stent placement.  COMPARISON:  2025.  FINDINGS: Evaluation of solid organs and blood vessels is suboptimal without the benefit of contrast.  Imaged lower thorax: Trace atelectasis left lung base.  Small hiatal hernia.  Liver: No acute findings.  Gallbladder/Bile Ducts: No biliary dilation. The gallbladder is normal.  Spleen: Unremarkable.  Pancreas: There is a lesion within the junction of the tail the pancreas and the as hilum.  Stable in size from previous study.  Indeterminate for pancreatic tail lesion versus volume averaging with an accessory splenule.  Adrenals: No discrete lesions.  Kidneys/Ureters: There is a right-sided ureteral stent.  Distal aspect is coiled within the bladder lumen to the left of midline.  Proximal aspect is in grossly satisfactory position.  3 mm stone inferior pole of the right kidney.  Left kidney is normal.  Bowel/mesentery/peritoneum: No bowel obstruction. Normal appendix. Fecal retention..  Abominal Wall: No defects.  Retroperitoneum/vessels: The No  performed exam and include at least one of the following: Automated exposure control, adjustment of the mA and/or kV according to size, and the use of iterative reconstruction technique.  ______________________________________ Electronically signed by: YONY MORALES M.D. Date:     05/03/2025 Time:    00:27     XR CHEST PORTABLE  Result Date: 5/3/2025  EXAM: CHEST, ONE-VIEW  HISTORY:  Shortness of breath  FINDINGS: Cardiac and mediastinal contours are normal.  Pulmonary vasculature is normal. No infiltrative or consolidative opacities.  Left approach pacemaker.  Prior mediastinotomy.       No acute cardiopulmonary disease and no change from 04/30/2025.    ______________________________________ Electronically signed by: YONY MORALES M.D. Date:     05/03/2025 Time:    00:13       Pertinent Labs:   CBC:   Recent Labs     05/04/25  0453 05/05/25  0213   WBC 4.1* 3.4*   HGB 10.3* 10.0*    114*     BMP:    Recent Labs     05/04/25  0453 05/05/25  0213    143   K 3.4* 3.6    110*   CO2 24 19*   BUN 25* 25*   CREATININE 2.2* 2.1*   GLUCOSE 109* 106*             Hospital Course:      79-year-old male recently hospitalized with right ureteral stone obstruction with pyelonephritis in April in [place] s/p ureteral stenting on 4/20/2025, later followed by stone extraction, presented with intermittent fevers and rigors.  Seen in consultation by urology.  CT in house here did not reveal any recurrent hydronephrosis, ureteral stent in good position.  Appears patient was not on antibiotics at discharge in [place] for pyelonephritis.  He was found to have enterococcal bacteremia on 2 of 2 blood cultures, sensitive to ampicillin.  Does also have history of aortic valve replacement and pacemaker.  Echo obtained but no obvious vegetations noted.  Blood cultures repeated on 5/4.  He did have some hematuria which subsequently resolved.  Eliquis for A-fib resumed without issue.  He remained afebrile  with repeat blood cultures from 5/4 remaining negative on 5/6.  Medically stable for discharge home on amoxicillin to complete 2 weeks of antibiotics from first negative blood culture.  Follow-up with PCP within a week and will also have outpatient follow-up with urology arranged within 2 weeks.         Physical Exam:  Vital Signs: BP (!) 160/93   Pulse 70   Temp 97 °F (36.1 °C) (Temporal)   Resp 16   Ht 1.778 m (5' 10\")   Wt 113.9 kg (251 lb 1.7 oz)   SpO2 94%   BMI 36.03 kg/m²   General appearance:.Alert and Cooperative   HEENT: Normocephalic.  Chest: No wheezes, normal effort  Cardiac: Normal heart rate, murmur present  Abdomen:soft, nondistended  Extremities: No clubbing or cyanosis. No peripheral edema.       Discharge Medications:         Medication List        START taking these medications      amoxicillin 875 MG tablet  Commonly known as: AMOXIL  Take 1 tablet by mouth 2 times daily for 12 days            CONTINUE taking these medications      allopurinol 300 MG tablet  Commonly known as: ZYLOPRIM     amiodarone 200 MG tablet  Commonly known as: CORDARONE  Take one tablet by mouth two times daily for 5 days. Then take one tablet by mouth once daily.     apixaban 5 MG Tabs tablet  Commonly known as: ELIQUIS  Take 1 tablet by mouth 2 times daily     aspirin 81 MG EC tablet     atorvastatin 40 MG tablet  Commonly known as: LIPITOR  Take 1 tablet by mouth daily.     bumetanide 0.5 MG tablet  Commonly known as: Bumex  Take 1 tablet by mouth 2 times daily     CoQ10 200 MG Caps     Folbee 2.5-25-1 MG Tabs tablet  Generic drug: folic acid-pyridoxine-cyanocobalamine     gabapentin 300 MG capsule  Commonly known as: NEURONTIN     Jardiance 25 MG tablet  Generic drug: empagliflozin     levothyroxine 25 MCG tablet  Commonly known as: SYNTHROID  Take 1 tablet by mouth Daily     omeprazole 20 MG delayed release capsule  Commonly known as: PRILOSEC     PRESERVISION AREDS 2 PO     Semaglutide(0.25 or 0.5MG/DOS) 2

## 2025-05-08 ENCOUNTER — OFFICE VISIT (OUTPATIENT)
Dept: CARDIOLOGY CLINIC | Age: 80
End: 2025-05-08

## 2025-05-08 VITALS
HEART RATE: 70 BPM | BODY MASS INDEX: 35.93 KG/M2 | OXYGEN SATURATION: 97 % | DIASTOLIC BLOOD PRESSURE: 60 MMHG | HEIGHT: 70 IN | SYSTOLIC BLOOD PRESSURE: 104 MMHG | WEIGHT: 251 LBS

## 2025-05-08 DIAGNOSIS — I25.10 CORONARY ARTERY DISEASE INVOLVING NATIVE CORONARY ARTERY OF NATIVE HEART WITHOUT ANGINA PECTORIS: ICD-10-CM

## 2025-05-08 DIAGNOSIS — I38 VALVULAR HEART DISEASE: ICD-10-CM

## 2025-05-08 DIAGNOSIS — E78.5 DYSLIPIDEMIA: ICD-10-CM

## 2025-05-08 DIAGNOSIS — I10 ESSENTIAL HYPERTENSION: ICD-10-CM

## 2025-05-08 DIAGNOSIS — I48.0 PAF (PAROXYSMAL ATRIAL FIBRILLATION) (HCC): Primary | ICD-10-CM

## 2025-05-08 RX ORDER — BUMETANIDE 0.5 MG/1
0.5 TABLET ORAL 2 TIMES DAILY
Qty: 60 TABLET | Refills: 5 | Status: SHIPPED | OUTPATIENT
Start: 2025-05-08

## 2025-05-08 ASSESSMENT — ENCOUNTER SYMPTOMS
WHEEZING: 0
COUGH: 0
SHORTNESS OF BREATH: 1
CHEST TIGHTNESS: 0
SORE THROAT: 0

## 2025-05-08 NOTE — PROGRESS NOTES
use: No          Review of Systems:    Review of Systems   Constitutional:  Negative for activity change, chills, diaphoresis, fatigue and fever.   HENT:  Negative for congestion and sore throat.    Respiratory:  Positive for shortness of breath (Occasional). Negative for cough, chest tightness and wheezing.    Cardiovascular:  Negative for chest pain, palpitations and leg swelling.   Neurological:  Positive for light-headedness (Occasional). Negative for dizziness, syncope and headaches.   Psychiatric/Behavioral:  Negative for confusion. The patient is not nervous/anxious.         Objective:    /60   Pulse 70   Ht 1.778 m (5' 10\")   Wt 113.9 kg (251 lb)   SpO2 97%   BMI 36.01 kg/m²     GENERAL - well developed and well nourished, conversant  HEENT -  PERRLA, Hearing appears normal, gentleman lids are normal.  External inspection of ears and nose appear normal.  NECK - no thyromegaly, no JVD, trachea is in the midline  CARDIOVASCULAR - PMI is in the mid line clavicular position, Normal S1 and S2 with no systolic murmur.  No S3 or S4    PULMONARY - no respiratory distress.  No wheezes or rales. Lungs are clear to ausculation, normal respiratory effort.  ABDOMEN  - soft, non tender, no rebound  MUSCULOSKELETAL  - range of motion of the upper and lower extermites appears normal and equal and is without pain   EXTREMITIES - no significant edema   NEUROLOGIC - gait and station are normal  SKIN - turgor is normal, can warm and dry.  PSYCHIATRIC - normal mood and affect, alert and orientated x 3,      ASSESSMENT:    ALL THE CARDIOLOGY PROBLEMS ARE LISTED ABOVE; HOWEVER, THE FOLLOWING SPECIFIC CARDIAC PROBLEMS / CONDITIONS WERE ADDRESSED AND TREATED DURING THE OFFICE VISIT TODAY:                                                                                            MEDICAL DECISION MAKING             Cardiac Specific Problem / Diagnosis  Discussion and Data Reviewed Diagnostic Procedures Ordered Management

## 2025-05-09 LAB
BACTERIA BLD CULT ORG #2: NORMAL
BACTERIA BLD CULT: NORMAL

## 2025-05-15 ENCOUNTER — OFFICE VISIT (OUTPATIENT)
Dept: UROLOGY | Age: 80
End: 2025-05-15

## 2025-05-15 VITALS — HEIGHT: 70 IN | BODY MASS INDEX: 36.79 KG/M2 | WEIGHT: 257 LBS | TEMPERATURE: 97.5 F

## 2025-05-15 DIAGNOSIS — N20.0 LEFT NEPHROLITHIASIS: ICD-10-CM

## 2025-05-15 DIAGNOSIS — N20.1 RIGHT URETERAL STONE: Primary | ICD-10-CM

## 2025-05-15 DIAGNOSIS — Z87.448 HISTORY OF PYELONEPHRITIS: ICD-10-CM

## 2025-05-15 DIAGNOSIS — Z96.0 RETAINED URETERAL STENT: ICD-10-CM

## 2025-05-15 DIAGNOSIS — N40.1 BENIGN PROSTATIC HYPERPLASIA WITH URINARY FREQUENCY: ICD-10-CM

## 2025-05-15 DIAGNOSIS — R35.0 BENIGN PROSTATIC HYPERPLASIA WITH URINARY FREQUENCY: ICD-10-CM

## 2025-05-15 LAB
BACTERIA URINE, POC: ABNORMAL
BILIRUBIN URINE: 0 MG/DL
BLOOD, URINE: POSITIVE
CASTS URINE, POC: ABNORMAL
CLARITY, UA: CLEAR
COLOR, UA: ABNORMAL
CRYSTALS URINE, POC: ABNORMAL
EPI CELLS URINE, POC: ABNORMAL
GLUCOSE URINE: 500
KETONES, URINE: NEGATIVE
LEUKOCYTE EST, POC: ABNORMAL
NITRITE, URINE: NEGATIVE
PH UA: 6 (ref 4.5–8)
PROTEIN UA: ABNORMAL
RBC URINE, POC: ABNORMAL
SPECIFIC GRAVITY UA: 1.01 (ref 1–1.03)
UROBILINOGEN, URINE: ABNORMAL
WBC URINE, POC: ABNORMAL
YEAST URINE, POC: ABNORMAL

## 2025-05-15 RX ORDER — TAMSULOSIN HYDROCHLORIDE 0.4 MG/1
0.4 CAPSULE ORAL NIGHTLY
Qty: 30 CAPSULE | Refills: 11 | Status: SHIPPED | OUTPATIENT
Start: 2025-05-15

## 2025-05-15 NOTE — PROGRESS NOTES
Patient of Dr. Donohue states they are here today to have stent removed S.P. cystoscopy retrograde ureteroscopy laser lithotripsy J stent placement on 4/20/25. Patient denies any fever, chills, nausea or vomiting.The string was located and stent was pulled with no complications. The patient was advised to continue any medications prescribed by Dr. Donohue or CHERYL Ramirez and to call if they have any questions or concerns. Patient verbalized understanding. Patient will follow up as scheduled.     Alla Balderas  was in office at time of procedure.   anxious appearing, cooperative    Vital Signs Last 24 Hrs  T(C): 36.7 (17 Jan 2019 11:10), Max: 36.7 (17 Jan 2019 11:10)  T(F): 98 (17 Jan 2019 11:10), Max: 98 (17 Jan 2019 11:10)  HR: 99 (17 Jan 2019 11:10) (99 - 99)  BP: 120/59 (17 Jan 2019 11:10) (120/59 - 120/59)  BP(mean): --  RR: 20 (17 Jan 2019 11:10) (20 - 20)  SpO2: 99% (17 Jan 2019 11:10) (99% - 99%) none see hpi

## 2025-05-15 NOTE — PROGRESS NOTES
Yuan Mejias is a 79 y.o. male who presents today   Chief Complaint   Patient presents with    Follow-up     I am here for a follow up  I am hoping to get my stent out today       {[id number]:[id number]}  5/6/25 day #4   HPI: Patient is a 79 y.o. male with past medical history as documented below . Patient presented to [facility] ED due to due to fever and rigors.  He said that fever started 2 days after a procedure in [facility] in [place]. He  had Stent placement and lithotripsy performed in [place] because there was no urology coverage in this facility [facility].  Patient denies shortness of breath, chest pain, cough, bowel or bladder incontinence, abdominal pain, diarrhea or dysuria.   Blood work significant for elevated procal, hyponatremia and hypokalemia. WBC WNL.  Troponin mildly elevated. ProBNP elevated.  Urinalysis did not show any evidence of UTI but hematuria  ED provider called urology. Urology said no surgical intervention at th          Past Medical History:   Diagnosis Date    Aortic stenosis     moderate    Arthritis     CAD (coronary artery disease) 05/2013    stent to Circ    Chest tightness, discomfort, or pressure 03/23/2012    CHF (congestive heart failure) ([facility])     Chronic anemia     RECEIVED PROCRIT    Chronic kidney disease     Stage 4    COVID-19 2023    Diabetes mellitus ([facility])     Family history of early CAD 03/23/2012    GERD (gastroesophageal reflux disease)     Hyperlipemia     Hypertension 03/23/2012    Neuromuscular disorder ([facility])     Obesity     Thyroid disease     HYPOTHYROIDISM    Unspecified sleep apnea        Past Surgical History:   Procedure Laterality Date    AORTIC VALVE REPLACEMENT  07/21/15    Tissue Valve, Dr. Braun    CARDIAC CATHETERIZATION  3/26/12    CARDIAC CATHETERIZATION  5/21/2013  JDT    with stent to Circ- EF 50%    CARDIAC CATHETERIZATION  11/4/13  JDT    EF 60%, no intervention    CARDIAC CATHETERIZATION  7/20/15  JDT    severe aortic

## 2025-06-04 NOTE — PROGRESS NOTES
Physician Progress Note      PATIENT:               CHRISTIANE SALDANA  CSN #:                  [id number]  :                       1945  ADMIT DATE:       2025 11:30 PM  DISCH DATE:        2025 1:06 PM  RESPONDING  PROVIDER #:        Herbert Scott MD          QUERY TEXT:    Please clarify in documentation the relationship, if any, between  previous ureteral stent placed and Pyelonephritis. Are the conditions:    The clinical indicators include:  - \" Patient presented to [facility] ED due to due to fever and rigors. He   said that fever started 2 days after a procedure in [facility] in   [place]. He had Stent placement and lithotripsy performed in [place]   because there was no urology coverage in this facility [facility].\" per   the H&P at the time of admission.  - \"CT Abdomen/Pelvis: 1. Interval placement of a right-sided ureteral stent   with no residual obstructive change.\" per the H&P.  - \"  He was found to have enterococcal bacteremia on 2 of 2 blood cultures,   sensitive to ampicillin\" per the DC summary.  Options provided:  -- Related to or associated with each other  -- Unrelated to each other  -- Other - I will add my own diagnosis  -- Disagree - Not applicable / Not valid  -- Disagree - Clinically unable to determine / Unknown  -- Refer to Clinical Documentation Reviewer    PROVIDER RESPONSE TEXT:    The conditions noted are related to or associated with each other.    Query created by: Mary Grace Gusman on 2025 10:10 AM      Electronically signed by:  Herbert Scott MD 2025 8:05 AM

## 2025-06-12 ENCOUNTER — TELEPHONE (OUTPATIENT)
Dept: CARDIOLOGY CLINIC | Age: 80
End: 2025-06-12

## 2025-06-12 NOTE — TELEPHONE ENCOUNTER
Patient's wife called in stating that patient is supposed to be on bumex 1 mg BID but you sent in 0.5 mg BID and the notes on Rx state     Note to Pharmacy: We are reducing patients dose of bumex to 0.5 mg twice daily due to low blood pressure on current dose     Your office note from same day states he is taking bumex 1 mg bid. Can you please clarify how you want pt to take it?

## 2025-06-12 NOTE — TELEPHONE ENCOUNTER
Patient's wife left message requesting call back about patient's bumex. Returned call and left message to call back to see what is needed.

## 2025-06-12 NOTE — TELEPHONE ENCOUNTER
Patient's wife notified of advisement and said, \"well what if he starts gaining fluid again\" Advised she will need to call the office and let us know. She voiced understanding.

## 2025-06-16 ENCOUNTER — TRANSCRIBE ORDERS (OUTPATIENT)
Dept: ADMINISTRATIVE | Age: 80
End: 2025-06-16

## 2025-06-16 DIAGNOSIS — K86.89 OTHER SPECIFIED DISEASES OF PANCREAS: Primary | ICD-10-CM

## 2025-06-19 ENCOUNTER — OFFICE VISIT (OUTPATIENT)
Dept: CARDIOLOGY CLINIC | Age: 80
End: 2025-06-19
Payer: OTHER GOVERNMENT

## 2025-06-19 VITALS
DIASTOLIC BLOOD PRESSURE: 80 MMHG | HEIGHT: 70 IN | BODY MASS INDEX: 36.79 KG/M2 | HEART RATE: 82 BPM | SYSTOLIC BLOOD PRESSURE: 136 MMHG | WEIGHT: 257 LBS

## 2025-06-19 DIAGNOSIS — R06.02 SHORTNESS OF BREATH: Primary | ICD-10-CM

## 2025-06-19 DIAGNOSIS — Z95.0 PACEMAKER: ICD-10-CM

## 2025-06-19 DIAGNOSIS — Z95.2 H/O AORTIC VALVE REPLACEMENT: ICD-10-CM

## 2025-06-19 DIAGNOSIS — I10 ESSENTIAL HYPERTENSION: Primary | ICD-10-CM

## 2025-06-19 DIAGNOSIS — Z79.01 CHRONIC ANTICOAGULATION: ICD-10-CM

## 2025-06-19 DIAGNOSIS — Z79.899 LONG TERM CURRENT USE OF ANTIARRHYTHMIC DRUG: ICD-10-CM

## 2025-06-19 DIAGNOSIS — I25.10 CORONARY ARTERY DISEASE INVOLVING NATIVE CORONARY ARTERY OF NATIVE HEART WITHOUT ANGINA PECTORIS: ICD-10-CM

## 2025-06-19 DIAGNOSIS — R06.02 SHORTNESS OF BREATH: ICD-10-CM

## 2025-06-19 DIAGNOSIS — E78.5 DYSLIPIDEMIA: ICD-10-CM

## 2025-06-19 DIAGNOSIS — I48.0 PAF (PAROXYSMAL ATRIAL FIBRILLATION) (HCC): ICD-10-CM

## 2025-06-19 PROCEDURE — 1123F ACP DISCUSS/DSCN MKR DOCD: CPT | Performed by: INTERNAL MEDICINE

## 2025-06-19 PROCEDURE — 3079F DIAST BP 80-89 MM HG: CPT | Performed by: INTERNAL MEDICINE

## 2025-06-19 PROCEDURE — 99214 OFFICE O/P EST MOD 30 MIN: CPT | Performed by: INTERNAL MEDICINE

## 2025-06-19 PROCEDURE — 93000 ELECTROCARDIOGRAM COMPLETE: CPT | Performed by: INTERNAL MEDICINE

## 2025-06-19 PROCEDURE — 3075F SYST BP GE 130 - 139MM HG: CPT | Performed by: INTERNAL MEDICINE

## 2025-06-19 RX ORDER — BUMETANIDE 1 MG/1
1 TABLET ORAL 2 TIMES DAILY
COMMUNITY
End: 2025-06-19 | Stop reason: SDUPTHER

## 2025-06-19 RX ORDER — AMIODARONE HYDROCHLORIDE 100 MG/1
100 TABLET ORAL DAILY
Qty: 90 TABLET | Refills: 1 | Status: SHIPPED | OUTPATIENT
Start: 2025-06-19

## 2025-06-19 RX ORDER — AMIODARONE HYDROCHLORIDE 100 MG/1
100 TABLET ORAL DAILY
COMMUNITY
End: 2025-06-19 | Stop reason: SDUPTHER

## 2025-06-19 RX ORDER — BUMETANIDE 1 MG/1
1 TABLET ORAL 2 TIMES DAILY
Qty: 60 TABLET | Refills: 3 | Status: SHIPPED | OUTPATIENT
Start: 2025-06-19

## 2025-06-19 ASSESSMENT — ENCOUNTER SYMPTOMS
VOMITING: 0
RESPIRATORY NEGATIVE: 1
SHORTNESS OF BREATH: 0
NAUSEA: 0
DIARRHEA: 0
GASTROINTESTINAL NEGATIVE: 1
EYES NEGATIVE: 1

## 2025-06-19 NOTE — PROGRESS NOTES
mouth 2 times daily 60 tablet 3    tamsulosin (FLOMAX) 0.4 MG capsule Take 1 capsule by mouth at bedtime 30 capsule 11    aspirin 81 MG EC tablet Take 1 tablet by mouth daily      vitamin D (VITAMIN D-3) 25 MCG (1000 UT) CAPS 2000 Iu daily      Multiple Vitamins-Minerals (PRESERVISION AREDS 2 PO) Take by mouth      apixaban (ELIQUIS) 5 MG TABS tablet Take 1 tablet by mouth 2 times daily 180 tablet 3    Semaglutide,0.25 or 0.5MG/DOS, 2 MG/1.5ML SOPN Inject into the skin once a week      empagliflozin (JARDIANCE) 25 MG tablet Take 1 tablet by mouth daily      levothyroxine (SYNTHROID) 25 MCG tablet Take 1 tablet by mouth Daily (Patient taking differently: Take 4 tablets by mouth Daily) 30 tablet 0    allopurinol (ZYLOPRIM) 300 MG tablet Take 1 tablet by mouth daily      Coenzyme Q10 (COQ10) 200 MG CAPS Take 200 mg by mouth in the morning and at bedtime       atorvastatin (LIPITOR) 40 MG tablet Take 1 tablet by mouth daily. 30 tablet 5    omeprazole (PRILOSEC) 20 MG capsule Take 1 capsule by mouth daily      gabapentin (NEURONTIN) 300 MG capsule Take 1 capsule by mouth in the morning and at bedtime.       No current facility-administered medications for this visit.     Social History     Socioeconomic History    Marital status:      Spouse name: Not on file    Number of children: Not on file    Years of education: Not on file    Highest education level: Not on file   Occupational History    Not on file   Tobacco Use    Smoking status: Former     Current packs/day: 0.00     Average packs/day: 1.5 packs/day for 23.0 years (34.5 ttl pk-yrs)     Types: Cigarettes     Start date:      Quit date:      Years since quittin.4    Smokeless tobacco: Never   Vaping Use    Vaping status: Never Used   Substance and Sexual Activity    Alcohol use: No    Drug use: No    Sexual activity: Not on file   Other Topics Concern    Not on file   Social History Narrative    Not on file     Social Drivers of Health

## 2025-06-20 ENCOUNTER — HOSPITAL ENCOUNTER (OUTPATIENT)
Dept: MRI IMAGING | Age: 80
Discharge: HOME | End: 2025-06-20
Payer: OTHER GOVERNMENT

## 2025-06-20 DIAGNOSIS — K86.89 OTHER SPECIFIED DISEASES OF PANCREAS: ICD-10-CM

## 2025-06-20 PROCEDURE — 74181 MRI ABDOMEN W/O CONTRAST: CPT

## 2025-06-25 ENCOUNTER — HOSPITAL ENCOUNTER (OUTPATIENT)
Dept: ULTRASOUND IMAGING | Age: 80
Discharge: HOME | End: 2025-06-25
Payer: OTHER GOVERNMENT

## 2025-06-25 DIAGNOSIS — N20.1 RIGHT URETERAL STONE: ICD-10-CM

## 2025-06-25 PROCEDURE — 76770 US EXAM ABDO BACK WALL COMP: CPT

## 2025-07-10 ENCOUNTER — OFFICE VISIT (OUTPATIENT)
Dept: UROLOGY | Age: 80
End: 2025-07-10
Payer: OTHER GOVERNMENT

## 2025-07-10 VITALS — BODY MASS INDEX: 37.29 KG/M2 | TEMPERATURE: 97.2 F | HEIGHT: 70 IN | WEIGHT: 260.5 LBS

## 2025-07-10 DIAGNOSIS — R35.0 BENIGN PROSTATIC HYPERPLASIA WITH URINARY FREQUENCY: ICD-10-CM

## 2025-07-10 DIAGNOSIS — N20.0 LEFT NEPHROLITHIASIS: ICD-10-CM

## 2025-07-10 DIAGNOSIS — R82.90 ABNORMAL FINDING ON URINALYSIS: ICD-10-CM

## 2025-07-10 DIAGNOSIS — N20.1 RIGHT URETERAL STONE: Primary | ICD-10-CM

## 2025-07-10 DIAGNOSIS — N40.1 BENIGN PROSTATIC HYPERPLASIA WITH URINARY FREQUENCY: ICD-10-CM

## 2025-07-10 LAB
BACTERIA URINE, POC: 1
BILIRUBIN URINE: 0 MG/DL
BLOOD, URINE: POSITIVE
CASTS URINE, POC: ABNORMAL
CLARITY, UA: CLEAR
COLOR, UA: YELLOW
CRYSTALS URINE, POC: ABNORMAL
EPI CELLS URINE, POC: ABNORMAL
GLUCOSE URINE: ABNORMAL
KETONES, URINE: NEGATIVE
LEUKOCYTE EST, POC: ABNORMAL
NITRITE, URINE: NEGATIVE
PH UA: 6 (ref 4.5–8)
PROTEIN UA: NEGATIVE
RBC URINE, POC: 2
SPECIFIC GRAVITY UA: 1.01 (ref 1–1.03)
UROBILINOGEN, URINE: ABNORMAL
WBC URINE, POC: 48
YEAST URINE, POC: ABNORMAL

## 2025-07-10 PROCEDURE — 99214 OFFICE O/P EST MOD 30 MIN: CPT | Performed by: NURSE PRACTITIONER

## 2025-07-10 PROCEDURE — 81001 URINALYSIS AUTO W/SCOPE: CPT | Performed by: NURSE PRACTITIONER

## 2025-07-10 PROCEDURE — 1123F ACP DISCUSS/DSCN MKR DOCD: CPT | Performed by: NURSE PRACTITIONER

## 2025-07-10 ASSESSMENT — ENCOUNTER SYMPTOMS
BACK PAIN: 0
VOMITING: 0
NAUSEA: 0
ABDOMINAL PAIN: 0
ABDOMINAL DISTENTION: 0

## 2025-07-10 NOTE — PROGRESS NOTES
Yuan Mejias is a 79 y.o. male who presents today   Chief Complaint   Patient presents with    Follow-up     Pt is here for 6wk follow up on kidney stones        Patient is a 79-year-old male presents clinic today for follow-up with renal ultrasound.  Originally presented to [facility] however urology not on-call therefore was transferred to [facility].  CT revealing a right hydro with obstructing stone at [facility] labs revealing DEANA.  He underwent cystoscopy with double-J stent on/20 he again underwent cystoscopy retrograde pyelogram ureteroscopy stone extraction on 4/26/2025 at [facility] tolerated procedure well.  He presented to the ER here on/30/25 where urine culture was positive for infection.  Presented back to the ER in 5/2/2025 with pyelonephritis.  CT revealing stent in good position.  He has multiple punctate nonobstructing renal stones.  This is his first stone episode.  Blood and urine cultures positive for recent admission.  He continued a course of antibiotics.  He is currently maintained on Flomax for BPH.  He comes in today with renal ultrasound.  Ultrasound negative for any hydronephrosis postoperatively.  He does have leukocytes in his urine today but denies any symptoms.  Looking at his CT he does have an enlarged prostate protruding into the base of the bladder.          Past Medical History:   Diagnosis Date    Aortic stenosis     moderate    Arthritis     CAD (coronary artery disease) 05/2013    stent to Circ    Chest tightness, discomfort, or pressure 03/23/2012    CHF (congestive heart failure) ([facility])     Chronic anemia     RECEIVED PROCRIT    Chronic kidney disease     Stage 4    COVID-19 2023    Diabetes mellitus ([facility])     Family history of early CAD 03/23/2012    GERD (gastroesophageal reflux disease)     Hyperlipemia     Hypertension 03/23/2012    Neuromuscular disorder ([facility])     Obesity     Thyroid disease     HYPOTHYROIDISM    Unspecified sleep apnea        Past Surgical History:

## 2025-07-12 LAB
BACTERIA UR CULT: ABNORMAL
BACTERIA UR CULT: ABNORMAL
ORGANISM: ABNORMAL

## 2025-07-14 ENCOUNTER — TELEPHONE (OUTPATIENT)
Dept: UROLOGY | Age: 80
End: 2025-07-14

## 2025-07-14 NOTE — TELEPHONE ENCOUNTER
Patient returned call to office over culture results. Patient was given results and voiced understanding.

## 2025-07-24 ENCOUNTER — HOSPITAL ENCOUNTER (OUTPATIENT)
Dept: PULMONOLOGY | Age: 80
Discharge: HOME | End: 2025-07-24
Attending: INTERNAL MEDICINE
Payer: OTHER GOVERNMENT

## 2025-07-24 ENCOUNTER — TELEPHONE (OUTPATIENT)
Dept: CARDIOLOGY CLINIC | Age: 80
End: 2025-07-24

## 2025-07-24 DIAGNOSIS — R06.02 SHORTNESS OF BREATH: ICD-10-CM

## 2025-07-24 PROCEDURE — 94060 EVALUATION OF WHEEZING: CPT

## 2025-07-24 PROCEDURE — 94726 PLETHYSMOGRAPHY LUNG VOLUMES: CPT

## 2025-07-24 PROCEDURE — 94729 DIFFUSING CAPACITY: CPT

## 2025-07-24 RX ORDER — ALBUTEROL SULFATE 0.83 MG/ML
2.5 SOLUTION RESPIRATORY (INHALATION) ONCE
Status: DISCONTINUED | OUTPATIENT
Start: 2025-07-24 | End: 2025-07-26 | Stop reason: HOSPADM

## 2025-07-24 NOTE — PROCEDURES
Media Information      Document Information    Other Order: Pulmonary Function Result   Pulmonary Function Result   07/24/2025 08:24   Attached To:   Full PFT Study With Bronchodilator [[id number]]   Hospital Encounter on 7/24/25 with [facility] PFT LAB [facility] [place]   Source Information    Bee Agrawal, P  [facility] Pft   Document History

## 2025-07-24 NOTE — TELEPHONE ENCOUNTER
Patient's [facility] called stating that [facility] for [facility] to get testing approved. PFT done today.

## 2025-07-28 PROBLEM — R06.02 SHORTNESS OF BREATH: Status: ACTIVE | Noted: 2025-07-28

## 2025-08-11 DIAGNOSIS — Z95.0 PACEMAKER: Primary | ICD-10-CM

## 2025-08-11 DIAGNOSIS — I49.5 SA NODE DYSFUNCTION (HCC): ICD-10-CM

## 2025-08-11 DIAGNOSIS — I48.0 PAF (PAROXYSMAL ATRIAL FIBRILLATION) (HCC): ICD-10-CM

## (undated) DEVICE — Device: Brand: NOMOLINE™ LH ADULT NASAL CO2 CANNULA WITH O2 4M